# Patient Record
Sex: MALE | Race: WHITE | NOT HISPANIC OR LATINO | Employment: FULL TIME | URBAN - METROPOLITAN AREA
[De-identification: names, ages, dates, MRNs, and addresses within clinical notes are randomized per-mention and may not be internally consistent; named-entity substitution may affect disease eponyms.]

---

## 2022-05-24 ENCOUNTER — HOSPITAL ENCOUNTER (EMERGENCY)
Facility: HOSPITAL | Age: 57
Discharge: HOME | End: 2022-05-24
Attending: EMERGENCY MEDICINE
Payer: COMMERCIAL

## 2022-05-24 VITALS
HEIGHT: 72 IN | WEIGHT: 165 LBS | SYSTOLIC BLOOD PRESSURE: 167 MMHG | RESPIRATION RATE: 18 BRPM | DIASTOLIC BLOOD PRESSURE: 84 MMHG | TEMPERATURE: 99 F | OXYGEN SATURATION: 100 % | BODY MASS INDEX: 22.35 KG/M2 | HEART RATE: 83 BPM

## 2022-05-24 DIAGNOSIS — S20.212A RIB CONTUSION, LEFT, INITIAL ENCOUNTER: ICD-10-CM

## 2022-05-24 DIAGNOSIS — R91.1 PULMONARY NODULE LESS THAN 6 MM IN DIAMETER WITH HIGH RISK FOR MALIGNANT NEOPLASM: ICD-10-CM
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
## 2022-05-24 DIAGNOSIS — Z91.89 PULMONARY NODULE LESS THAN 6 MM IN DIAMETER WITH HIGH RISK FOR MALIGNANT NEOPLASM: ICD-10-CM

## 2022-05-24 PROCEDURE — 99284 EMERGENCY DEPT VISIT MOD MDM: CPT | Mod: 25

## 2022-05-24 RX ORDER — NAPROXEN 500 MG/1
500 TABLET ORAL 2 TIMES DAILY WITH MEALS
Qty: 30 TABLET | Refills: 0 | Status: ON HOLD | OUTPATIENT
Start: 2022-05-24 | End: 2022-08-20 | Stop reason: HOSPADM

## 2022-05-24 RX ORDER — LIDOCAINE 50 MG/G
1 PATCH TOPICAL DAILY
Qty: 5 PATCH | Refills: 0 | Status: SHIPPED | OUTPATIENT
Start: 2022-05-24 | End: 2022-12-08 | Stop reason: SDUPTHER

## 2022-05-24 NOTE — DISCHARGE INSTRUCTIONS

## 2022-05-24 NOTE — ED TRIAGE NOTES
Pt presents to the Ed for a CT scan . Pt was told to come get a futhure evaluation after having a xray of chest 5/9 after he fell at work and fracture his ribs.         Patient identifiers verified and correct.     APPEARANCE: Patient not in acute distress.  NEURO: Awake, alert, appropriate for age, condition, and situation, pupils equal, round, and reactive.   HEENT: Head symmetrical. Eyes bilateral.  Bilateral ears without drainage. Bilateral nares patent, throat clear.  CARDIAC: Regular rate and rhythm  RESPIRATORY: Airway is open and patent. Respirations are normal and spontaneous on room air.   GI/: Abdomen soft and non-distended.   NEUROVASCULAR: All extremities are warm and pink. .  MUSCULOSKELETAL: Moves all extremities.   SKIN: Warm and dry, adequate turgor, mucus membranes moist and pink; no breakdown, lesions, or ecchymosis noted.     Will continue to monitor.

## 2022-05-24 NOTE — ED PROVIDER NOTES
Encounter Date: 5/24/2022       History     Chief Complaint   Patient presents with    Rib Injury     Rib injury at work fall from standing position onto a truck on left side 5/9. No sob or weakness. Patient sent over for CT. Had xray per work and sent for further evaluatin     57-year-old male presents to ED by request of urgent care to have CT of chest performed for evaluation of possible rib x-ray.  Patient reports having fall at work contusing left-sided rib cage on 05/09.  He has continued to have pain to contusion location since fall, prompting him to follow-up with outside urgent care today.  Patient reports urgent care did perform chest x-ray with suspicious findings for rib fracture and instructed patient to report to ED for CT.  Patient described pain to site as sharp, nonradiating, worse while lying on left side or sneezing, severity 8/10.  He has attempted Tylenol with minimal improvement.  He denies cough, shortness of breath, nausea, vomiting.  No other acute complaints at this time.    The history is provided by the patient.     Review of patient's allergies indicates:  No Known Allergies  No past medical history on file.  No past surgical history on file.  No family history on file.     Review of Systems   Constitutional: Negative for chills and fever.   Respiratory: Negative for cough and shortness of breath.    Cardiovascular: Negative for palpitations and leg swelling.        Rib pain   Gastrointestinal: Negative for abdominal pain, nausea and vomiting.   Musculoskeletal: Negative for back pain, neck pain and neck stiffness.       Physical Exam     Initial Vitals [05/24/22 1257]   BP Pulse Resp Temp SpO2   (!) 190/86 96 20 98.8 °F (37.1 °C) 98 %      MAP       --         Physical Exam    Nursing note and vitals reviewed.  Constitutional: He appears well-developed and well-nourished. He is active. He does not have a sickly appearance. He does not appear ill. No distress.   HENT:   Head:  Normocephalic and atraumatic.   Neck:   Normal range of motion.  Cardiovascular: Normal rate, regular rhythm and normal heart sounds.   Pulmonary/Chest: Effort normal and breath sounds normal.   Left-sided lateral chest wall tenderness over mid rib cage.  No physical palpable deformities.  No overlying skin changes or bruising.  No crepitus.  Lungs clear bilaterally.   Musculoskeletal:      Cervical back: Normal range of motion.     Neurological: He is alert. GCS eye subscore is 4. GCS verbal subscore is 5. GCS motor subscore is 6.   Skin: Skin is warm and dry.   Psychiatric: He has a normal mood and affect. His speech is normal and behavior is normal.         ED Course   Procedures  Labs Reviewed - No data to display       Imaging Results           CT Chest Without Contrast (Final result)  Result time 05/24/22 14:33:13    Final result by Imtiaz Saxena MD (05/24/22 14:33:13)                 Impression:      This report was flagged in Epic as abnormal.    1. No acute solid organ injury within the thorax or upper abdomen.  No convincing acute displaced fracture or dislocation.  No pneumothorax.  2. Scattered cavitary lesions throughout the pulmonary parenchyma, most prominently involving the left lower lobe near the fissure.  Comparison with any previous examinations would be helpful as malignant and infectious causes can present in this fashion.  Findings are superimposed upon emphysematous change.  Pulmonary nodules or described as above, term follow-up, no greater than 3 months, is recommended pending comparison to previous examinations.  3. Please see above for several additional findings.      Electronically signed by: Imtiaz Saxena MD  Date:    05/24/2022  Time:    14:33             Narrative:    EXAMINATION:  CT CHEST WITHOUT CONTRAST    CLINICAL HISTORY:  Chest trauma, blunt;    TECHNIQUE:  Low dose axial images, sagittal and coronal reformations were obtained from the thoracic inlet to the lung bases.  Contrast was not administered.    COMPARISON:  None.    FINDINGS:  The structures at the base of the neck are unremarkable.  There are a few scattered upper limit of normal caliber to mildly prominent mediastinal lymph nodes particularly in the region of the AP window.  The heart is not enlarged.  No pericardial effusion.  The thoracic aorta tapers normally noting atherosclerotic calcification along its course.  Allowing for motion artifact, the visualized portions of the spleen, pancreas, adrenal glands, kidneys, gallbladder and liver are grossly unremarkable.  There is a low attenuating lesion arising from the upper pole of the right kidney measuring 1.2 cm with attenuation suggesting cyst.    The airways are patent.  There is scattered pulmonary emphysematous change and multiple scattered regions of bandlike atelectasis/scarring.  There is scattered peripheral pulmonary fibrotic change particularly along the anterior aspects of the bilateral upper lobes.  There are several scattered cavitary lesions throughout the pulmonary parenchyma, largest within the right upper lobe measures 9.3 cm.  There are a few scattered tree-in-bud type nodules within the right upper lobe laterally.  There is bilateral basilar dependent atelectasis/scarring.  There is a pulmonary nodule within the left lung apex measuring 6-7 mm with possible early central cavitation.  Additional cavitary lesion is noted within the left upper lobe measuring 4-5 mm.  There is a 2-3 mm pulmonary nodule within the inferior aspect of the lingula.  There are several clustered cavitary lesions within the left lower lobe with surrounding tree-in-bud type nodularity and ground-glass attenuation.  Largest thick-walled  cavitary lesion within the left lower lobe measures 3.4 cm, and abuts the fissure on the left.  There is adjacent peribronchial wall thickening.  No pneumothorax.  No pleural effusion.  No pneumothorax.    There is osteopenia.  No acute displaced  rib fracture.  The sternum is intact.  No significant axillary lymphadenopathy.  There may be remote injury involving the inferior aspect of left rib 11.                                 Medications - No data to display  Medical Decision Making:   Initial Assessment:   Patient presents by requested urgent care requesting CT of chest for rib x-ray evaluation.  Patient having fall with left-sided rib contusion on 05/09.  Continued pain to contusion site.  No cough shortness of breath.  Afebrile arrival with O2 sat 90% on room air.  On exam, reproducible tenderness to left-sided mid lateral rib cage.  No physical or palpable deformities.  Lungs clear bilaterally.  Differential Diagnosis:   Fall, contusion, strain, sprain, dislocation, fracture, pneumothorax, hemothorax  ED Management:  CT chest    After lengthy discussion with patient, he does repeatedly stated requested proceeding with CT of chest for further evaluation.    CT showing no acute solid organ injury within the thorax or upper abdomen.  No convincing acute displaced fracture or dislocation.  No pneumothorax.    Incidental findings of scattered cavitary lesions throughout the pulmonary parenchyma, most prominently involving the left lower lobe near the fissure.  Results discussed with patient.  He does admit to smoking for a long time.  Ambulatory referral will be sent to pulmonology.    Prescription for Lidoderm patches and naproxen.  Encouraged addition Tylenol as needed, ice, rest, monitor symptoms closely, close PCP follow-up.  ED return precautions discussed at length.  Patient states his understanding and agrees with plan.    Patient was discussed with attending, Dr. Baeza, who agrees with ED course and dispo.                      Clinical Impression:   Final diagnoses:  [W19.XXXA] Fall, initial encounter (Primary)  [S20.212A] Rib contusion, left, initial encounter  [R91.1, Z91.89] Pulmonary nodule less than 6 mm in diameter with high risk for  malignant neoplasm          ED Disposition Condition    Discharge Stable        ED Prescriptions     Medication Sig Dispense Start Date End Date Auth. Provider    naproxen (NAPROSYN) 500 MG tablet Take 1 tablet (500 mg total) by mouth 2 (two) times daily with meals. 30 tablet 5/24/2022  Chadd Marquez PA-C    LIDOcaine (LIDODERM) 5 % Place 1 patch onto the skin once daily. Remove & Discard patch within 12 hours or as directed by MD 5 patch 5/24/2022  Chadd Marquez PA-C        Follow-up Information     Follow up With Specialties Details Why Contact Info    Your Doctor               Chadd Marquez PA-C  05/24/22 3076

## 2022-05-24 NOTE — Clinical Note
"Hai Garcia" Christian was seen and treated in our emergency department on 5/24/2022.  He may return to work on 05/27/2022.       If you have any questions or concerns, please don't hesitate to call.      Chadd Marquez PA-C"

## 2022-05-25 ENCOUNTER — TELEPHONE (OUTPATIENT)
Dept: PULMONOLOGY | Facility: CLINIC | Age: 57
End: 2022-05-25

## 2022-05-25 NOTE — TELEPHONE ENCOUNTER
Received a Lenda message from the Echo  Lorie Tiwari for a pulmonary consult . I called the phone number listed [phone] and a man answered and said I had the wrong number he is not Hai Gonzales. The radiologist who read the Ct recommended a 3 month followup scan. I will hold this message and try again tomorrow. Renata Gilliland LPN

## 2022-05-25 NOTE — TELEPHONE ENCOUNTER
----- Message from Lorie Tiwari sent at 5/25/2022  2:37 PM CDT -----  Good afternoon,    The patient has a referral from the emergency department with a diagnosis of Pulmonary nodule less than 6 mm in diameter with high risk for malignant neoplas... Please assist with scheduling the patient?    Thank you

## 2022-08-15 ENCOUNTER — HOSPITAL ENCOUNTER (INPATIENT)
Facility: HOSPITAL | Age: 57
LOS: 5 days | Discharge: HOME | DRG: 177 | End: 2022-08-20
Attending: EMERGENCY MEDICINE | Admitting: INTERNAL MEDICINE
Payer: MEDICAID

## 2022-08-15 DIAGNOSIS — J18.9 PNEUMONIA OF LEFT UPPER LOBE DUE TO INFECTIOUS ORGANISM: ICD-10-CM

## 2022-08-15 DIAGNOSIS — R89.9 ACID-FAST BACTERIA PRESENT: ICD-10-CM

## 2022-08-15 DIAGNOSIS — U07.1 COVID-19 VIRUS INFECTION: ICD-10-CM

## 2022-08-15 DIAGNOSIS — R07.9 CHEST PAIN: ICD-10-CM

## 2022-08-15 DIAGNOSIS — E86.0 DEHYDRATION: ICD-10-CM

## 2022-08-15 DIAGNOSIS — R11.2 NAUSEA & VOMITING: ICD-10-CM

## 2022-08-15 DIAGNOSIS — R04.2 HEMOPTYSIS: ICD-10-CM

## 2022-08-15 DIAGNOSIS — E87.1 HYPONATREMIA: ICD-10-CM

## 2022-08-15 DIAGNOSIS — J98.4 CAVITARY LESION OF LUNG: Primary | ICD-10-CM

## 2022-08-15 DIAGNOSIS — R93.89 ABNORMAL CT OF THE CHEST: ICD-10-CM

## 2022-08-15 PROBLEM — K92.0 HEMATEMESIS OF UNKNOWN ETIOLOGY: Status: ACTIVE | Noted: 2022-08-15

## 2022-08-15 PROBLEM — D72.829 LEUKOCYTOSIS: Status: ACTIVE | Noted: 2022-08-15

## 2022-08-15 PROBLEM — F17.200 NICOTINE DEPENDENCE: Status: ACTIVE | Noted: 2022-08-15

## 2022-08-15 LAB
ABO + RH BLD: NORMAL
ALBUMIN SERPL BCP-MCNC: 2.5 G/DL (ref 3.5–5.2)
ALP SERPL-CCNC: 57 U/L (ref 55–135)
ALT SERPL W/O P-5'-P-CCNC: 7 U/L (ref 10–44)
ANION GAP SERPL CALC-SCNC: 11 MMOL/L (ref 8–16)
ANION GAP SERPL CALC-SCNC: 11 MMOL/L (ref 8–16)
APTT BLDCRRT: 36.2 SEC (ref 21–32)
AST SERPL-CCNC: 17 U/L (ref 10–40)
BASOPHILS # BLD AUTO: 0.04 K/UL (ref 0–0.2)
BASOPHILS NFR BLD: 0.3 % (ref 0–1.9)
BILIRUB SERPL-MCNC: 0.2 MG/DL (ref 0.1–1)
BLD GP AB SCN CELLS X3 SERPL QL: NORMAL
BNP SERPL-MCNC: 22 PG/ML (ref 0–99)
BUN SERPL-MCNC: 8 MG/DL (ref 6–20)
BUN SERPL-MCNC: 9 MG/DL (ref 6–20)
CALCIUM SERPL-MCNC: 8.9 MG/DL (ref 8.7–10.5)
CALCIUM SERPL-MCNC: 9 MG/DL (ref 8.7–10.5)
CHLORIDE SERPL-SCNC: 87 MMOL/L (ref 95–110)
CHLORIDE SERPL-SCNC: 90 MMOL/L (ref 95–110)
CK SERPL-CCNC: 131 U/L (ref 20–200)
CO2 SERPL-SCNC: 24 MMOL/L (ref 23–29)
CO2 SERPL-SCNC: 26 MMOL/L (ref 23–29)
CREAT SERPL-MCNC: 0.6 MG/DL (ref 0.5–1.4)
CREAT SERPL-MCNC: 0.7 MG/DL (ref 0.5–1.4)
CTP QC/QA: YES
D DIMER PPP IA.FEU-MCNC: 0.74 MG/L FEU
DIFFERENTIAL METHOD: ABNORMAL
EOSINOPHIL # BLD AUTO: 0 K/UL (ref 0–0.5)
EOSINOPHIL NFR BLD: 0.1 % (ref 0–8)
ERYTHROCYTE [DISTWIDTH] IN BLOOD BY AUTOMATED COUNT: 13.6 % (ref 11.5–14.5)
ERYTHROCYTE [SEDIMENTATION RATE] IN BLOOD BY WESTERGREN METHOD: 111 MM/HR (ref 0–10)
EST. GFR  (NO RACE VARIABLE): >60 ML/MIN/1.73 M^2
EST. GFR  (NO RACE VARIABLE): >60 ML/MIN/1.73 M^2
FERRITIN SERPL-MCNC: 4271 NG/ML (ref 20–300)
GLUCOSE SERPL-MCNC: 134 MG/DL (ref 70–110)
GLUCOSE SERPL-MCNC: 99 MG/DL (ref 70–110)
HCT VFR BLD AUTO: 39.4 % (ref 40–54)
HGB BLD-MCNC: 13.4 G/DL (ref 14–18)
IMM GRANULOCYTES # BLD AUTO: 0.09 K/UL (ref 0–0.04)
IMM GRANULOCYTES NFR BLD AUTO: 0.6 % (ref 0–0.5)
INR PPP: 1.2 (ref 0.8–1.2)
INR PPP: 1.2 (ref 0.8–1.2)
LACTATE SERPL-SCNC: 1.1 MMOL/L (ref 0.5–2.2)
LDH SERPL L TO P-CCNC: 161 U/L (ref 110–260)
LIPASE SERPL-CCNC: 16 U/L (ref 4–60)
LYMPHOCYTES # BLD AUTO: 1 K/UL (ref 1–4.8)
LYMPHOCYTES NFR BLD: 6.4 % (ref 18–48)
MCH RBC QN AUTO: 30.3 PG (ref 27–31)
MCHC RBC AUTO-ENTMCNC: 34 G/DL (ref 32–36)
MCV RBC AUTO: 89 FL (ref 82–98)
MONOCYTES # BLD AUTO: 1.1 K/UL (ref 0.3–1)
MONOCYTES NFR BLD: 7.1 % (ref 4–15)
NEUTROPHILS # BLD AUTO: 13.5 K/UL (ref 1.8–7.7)
NEUTROPHILS NFR BLD: 85.5 % (ref 38–73)
NRBC BLD-RTO: 0 /100 WBC
PLATELET # BLD AUTO: 333 K/UL (ref 150–450)
PMV BLD AUTO: 8.9 FL (ref 9.2–12.9)
POTASSIUM SERPL-SCNC: 4.5 MMOL/L (ref 3.5–5.1)
POTASSIUM SERPL-SCNC: 4.7 MMOL/L (ref 3.5–5.1)
PROT SERPL-MCNC: 7.3 G/DL (ref 6–8.4)
PROTHROMBIN TIME: 11.9 SEC (ref 9–12.5)
PROTHROMBIN TIME: 12.4 SEC (ref 9–12.5)
RBC # BLD AUTO: 4.42 M/UL (ref 4.6–6.2)
SARS-COV-2 RDRP RESP QL NAA+PROBE: POSITIVE
SODIUM SERPL-SCNC: 124 MMOL/L (ref 136–145)
SODIUM SERPL-SCNC: 125 MMOL/L (ref 136–145)
SODIUM UR-SCNC: <10 MMOL/L (ref 20–250)
TROPONIN I SERPL DL<=0.01 NG/ML-MCNC: <0.006 NG/ML (ref 0–0.03)
WBC # BLD AUTO: 15.71 K/UL (ref 3.9–12.7)

## 2022-08-15 PROCEDURE — 80048 BASIC METABOLIC PNL TOTAL CA: CPT | Mod: XB

## 2022-08-15 PROCEDURE — 25500020 PHARM REV CODE 255: Performed by: EMERGENCY MEDICINE

## 2022-08-15 PROCEDURE — 87205 SMEAR GRAM STAIN: CPT | Performed by: EMERGENCY MEDICINE

## 2022-08-15 PROCEDURE — 87070 CULTURE OTHR SPECIMN AEROBIC: CPT | Mod: 59

## 2022-08-15 PROCEDURE — 11000001 HC ACUTE MED/SURG PRIVATE ROOM

## 2022-08-15 PROCEDURE — 83690 ASSAY OF LIPASE: CPT | Performed by: EMERGENCY MEDICINE

## 2022-08-15 PROCEDURE — 85379 FIBRIN DEGRADATION QUANT: CPT

## 2022-08-15 PROCEDURE — 85025 COMPLETE CBC W/AUTO DIFF WBC: CPT | Performed by: EMERGENCY MEDICINE

## 2022-08-15 PROCEDURE — 83880 ASSAY OF NATRIURETIC PEPTIDE: CPT | Performed by: INTERNAL MEDICINE

## 2022-08-15 PROCEDURE — 86901 BLOOD TYPING SEROLOGIC RH(D): CPT | Performed by: EMERGENCY MEDICINE

## 2022-08-15 PROCEDURE — 25000003 PHARM REV CODE 250: Performed by: STUDENT IN AN ORGANIZED HEALTH CARE EDUCATION/TRAINING PROGRAM

## 2022-08-15 PROCEDURE — 82728 ASSAY OF FERRITIN: CPT

## 2022-08-15 PROCEDURE — 36415 COLL VENOUS BLD VENIPUNCTURE: CPT

## 2022-08-15 PROCEDURE — 82550 ASSAY OF CK (CPK): CPT

## 2022-08-15 PROCEDURE — 27000207 HC ISOLATION

## 2022-08-15 PROCEDURE — 85610 PROTHROMBIN TIME: CPT | Performed by: EMERGENCY MEDICINE

## 2022-08-15 PROCEDURE — 83615 LACTATE (LD) (LDH) ENZYME: CPT

## 2022-08-15 PROCEDURE — 87070 CULTURE OTHR SPECIMN AEROBIC: CPT | Performed by: STUDENT IN AN ORGANIZED HEALTH CARE EDUCATION/TRAINING PROGRAM

## 2022-08-15 PROCEDURE — C9113 INJ PANTOPRAZOLE SODIUM, VIA: HCPCS | Performed by: EMERGENCY MEDICINE

## 2022-08-15 PROCEDURE — 96374 THER/PROPH/DIAG INJ IV PUSH: CPT

## 2022-08-15 PROCEDURE — 93010 EKG 12-LEAD: ICD-10-PCS | Mod: ,,, | Performed by: INTERNAL MEDICINE

## 2022-08-15 PROCEDURE — 83935 ASSAY OF URINE OSMOLALITY: CPT

## 2022-08-15 PROCEDURE — 94640 AIRWAY INHALATION TREATMENT: CPT

## 2022-08-15 PROCEDURE — 93005 ELECTROCARDIOGRAM TRACING: CPT

## 2022-08-15 PROCEDURE — 87070 CULTURE OTHR SPECIMN AEROBIC: CPT | Mod: 59 | Performed by: EMERGENCY MEDICINE

## 2022-08-15 PROCEDURE — 99291 CRITICAL CARE FIRST HOUR: CPT | Mod: 25

## 2022-08-15 PROCEDURE — 87205 SMEAR GRAM STAIN: CPT | Mod: 59

## 2022-08-15 PROCEDURE — 80053 COMPREHEN METABOLIC PANEL: CPT | Performed by: EMERGENCY MEDICINE

## 2022-08-15 PROCEDURE — 87205 SMEAR GRAM STAIN: CPT | Mod: 59 | Performed by: STUDENT IN AN ORGANIZED HEALTH CARE EDUCATION/TRAINING PROGRAM

## 2022-08-15 PROCEDURE — 84300 ASSAY OF URINE SODIUM: CPT

## 2022-08-15 PROCEDURE — 85652 RBC SED RATE AUTOMATED: CPT

## 2022-08-15 PROCEDURE — 96361 HYDRATE IV INFUSION ADD-ON: CPT

## 2022-08-15 PROCEDURE — 63600175 PHARM REV CODE 636 W HCPCS: Performed by: EMERGENCY MEDICINE

## 2022-08-15 PROCEDURE — 83605 ASSAY OF LACTIC ACID: CPT

## 2022-08-15 PROCEDURE — U0002 COVID-19 LAB TEST NON-CDC: HCPCS | Performed by: EMERGENCY MEDICINE

## 2022-08-15 PROCEDURE — 36415 COLL VENOUS BLD VENIPUNCTURE: CPT | Performed by: INTERNAL MEDICINE

## 2022-08-15 PROCEDURE — 93010 ELECTROCARDIOGRAM REPORT: CPT | Mod: ,,, | Performed by: INTERNAL MEDICINE

## 2022-08-15 PROCEDURE — 85610 PROTHROMBIN TIME: CPT | Mod: 91

## 2022-08-15 PROCEDURE — 25000003 PHARM REV CODE 250

## 2022-08-15 PROCEDURE — 85730 THROMBOPLASTIN TIME PARTIAL: CPT

## 2022-08-15 PROCEDURE — 63600175 PHARM REV CODE 636 W HCPCS

## 2022-08-15 PROCEDURE — 84484 ASSAY OF TROPONIN QUANT: CPT

## 2022-08-15 PROCEDURE — 94799 UNLISTED PULMONARY SVC/PX: CPT

## 2022-08-15 RX ORDER — ALBUTEROL SULFATE 2.5 MG/.5ML
2.5 SOLUTION RESPIRATORY (INHALATION)
Status: DISCONTINUED | OUTPATIENT
Start: 2022-08-15 | End: 2022-08-15

## 2022-08-15 RX ORDER — ACETAMINOPHEN 325 MG/1
650 TABLET ORAL EVERY 4 HOURS PRN
Status: DISCONTINUED | OUTPATIENT
Start: 2022-08-15 | End: 2022-08-20 | Stop reason: HOSPADM

## 2022-08-15 RX ORDER — IBUPROFEN 200 MG
24 TABLET ORAL
Status: DISCONTINUED | OUTPATIENT
Start: 2022-08-15 | End: 2022-08-20 | Stop reason: HOSPADM

## 2022-08-15 RX ORDER — LOPERAMIDE HYDROCHLORIDE 2 MG/1
2 CAPSULE ORAL 4 TIMES DAILY PRN
Status: DISCONTINUED | OUTPATIENT
Start: 2022-08-15 | End: 2022-08-20 | Stop reason: HOSPADM

## 2022-08-15 RX ORDER — ASCORBIC ACID 500 MG
500 TABLET ORAL 2 TIMES DAILY
Status: DISCONTINUED | OUTPATIENT
Start: 2022-08-15 | End: 2022-08-20 | Stop reason: HOSPADM

## 2022-08-15 RX ORDER — SODIUM CHLORIDE 0.9 % (FLUSH) 0.9 %
10 SYRINGE (ML) INJECTION EVERY 8 HOURS PRN
Status: DISCONTINUED | OUTPATIENT
Start: 2022-08-15 | End: 2022-08-20 | Stop reason: HOSPADM

## 2022-08-15 RX ORDER — MAG HYDROX/ALUMINUM HYD/SIMETH 200-200-20
30 SUSPENSION, ORAL (FINAL DOSE FORM) ORAL 4 TIMES DAILY PRN
Status: DISCONTINUED | OUTPATIENT
Start: 2022-08-15 | End: 2022-08-20 | Stop reason: HOSPADM

## 2022-08-15 RX ORDER — ONDANSETRON 2 MG/ML
4 INJECTION INTRAMUSCULAR; INTRAVENOUS EVERY 6 HOURS PRN
Status: DISCONTINUED | OUTPATIENT
Start: 2022-08-15 | End: 2022-08-20 | Stop reason: HOSPADM

## 2022-08-15 RX ORDER — PANTOPRAZOLE SODIUM 40 MG/10ML
80 INJECTION, POWDER, LYOPHILIZED, FOR SOLUTION INTRAVENOUS
Status: COMPLETED | OUTPATIENT
Start: 2022-08-15 | End: 2022-08-15

## 2022-08-15 RX ORDER — INSULIN ASPART 100 [IU]/ML
0-5 INJECTION, SOLUTION INTRAVENOUS; SUBCUTANEOUS
Status: DISCONTINUED | OUTPATIENT
Start: 2022-08-15 | End: 2022-08-20 | Stop reason: HOSPADM

## 2022-08-15 RX ORDER — SIMETHICONE 80 MG
1 TABLET,CHEWABLE ORAL 4 TIMES DAILY PRN
Status: DISCONTINUED | OUTPATIENT
Start: 2022-08-15 | End: 2022-08-20 | Stop reason: HOSPADM

## 2022-08-15 RX ORDER — ALBUTEROL SULFATE 90 UG/1
2 AEROSOL, METERED RESPIRATORY (INHALATION) EVERY 4 HOURS PRN
Status: DISCONTINUED | OUTPATIENT
Start: 2022-08-15 | End: 2022-08-20 | Stop reason: HOSPADM

## 2022-08-15 RX ORDER — BENZONATATE 100 MG/1
100 CAPSULE ORAL 3 TIMES DAILY PRN
Status: DISCONTINUED | OUTPATIENT
Start: 2022-08-15 | End: 2022-08-20 | Stop reason: HOSPADM

## 2022-08-15 RX ORDER — PREDNISONE 20 MG/1
40 TABLET ORAL
Status: COMPLETED | OUTPATIENT
Start: 2022-08-15 | End: 2022-08-15

## 2022-08-15 RX ORDER — SODIUM CHLORIDE 0.9 % (FLUSH) 0.9 %
10 SYRINGE (ML) INJECTION
Status: DISCONTINUED | OUTPATIENT
Start: 2022-08-15 | End: 2022-08-20 | Stop reason: HOSPADM

## 2022-08-15 RX ORDER — SODIUM CHLORIDE FOR INHALATION 3 %
4 VIAL, NEBULIZER (ML) INHALATION
Status: DISCONTINUED | OUTPATIENT
Start: 2022-08-15 | End: 2022-08-15

## 2022-08-15 RX ORDER — IBUPROFEN 200 MG
16 TABLET ORAL
Status: DISCONTINUED | OUTPATIENT
Start: 2022-08-15 | End: 2022-08-20 | Stop reason: HOSPADM

## 2022-08-15 RX ORDER — IPRATROPIUM BROMIDE AND ALBUTEROL SULFATE 2.5; .5 MG/3ML; MG/3ML
3 SOLUTION RESPIRATORY (INHALATION)
Status: ACTIVE | OUTPATIENT
Start: 2022-08-15 | End: 2022-08-15

## 2022-08-15 RX ORDER — TALC
6 POWDER (GRAM) TOPICAL NIGHTLY PRN
Status: DISCONTINUED | OUTPATIENT
Start: 2022-08-15 | End: 2022-08-20 | Stop reason: HOSPADM

## 2022-08-15 RX ORDER — GLUCAGON 1 MG
1 KIT INJECTION
Status: DISCONTINUED | OUTPATIENT
Start: 2022-08-15 | End: 2022-08-20 | Stop reason: HOSPADM

## 2022-08-15 RX ADMIN — SODIUM CHLORIDE, SODIUM LACTATE, POTASSIUM CHLORIDE, AND CALCIUM CHLORIDE 500 ML: .6; .31; .03; .02 INJECTION, SOLUTION INTRAVENOUS at 12:08

## 2022-08-15 RX ADMIN — SODIUM CHLORIDE 1000 ML: 0.9 INJECTION, SOLUTION INTRAVENOUS at 05:08

## 2022-08-15 RX ADMIN — AZITHROMYCIN MONOHYDRATE 500 MG: 500 INJECTION, POWDER, LYOPHILIZED, FOR SOLUTION INTRAVENOUS at 09:08

## 2022-08-15 RX ADMIN — OXYCODONE HYDROCHLORIDE AND ACETAMINOPHEN 500 MG: 500 TABLET ORAL at 09:08

## 2022-08-15 RX ADMIN — REMDESIVIR 200 MG: 100 INJECTION, POWDER, LYOPHILIZED, FOR SOLUTION INTRAVENOUS at 05:08

## 2022-08-15 RX ADMIN — CEFTRIAXONE 1 G: 1 INJECTION, SOLUTION INTRAVENOUS at 01:08

## 2022-08-15 RX ADMIN — TRANEXAMIC ACID 500 MG: 100 INJECTION, SOLUTION INTRAVENOUS at 09:08

## 2022-08-15 RX ADMIN — IOHEXOL 100 ML: 350 INJECTION, SOLUTION INTRAVENOUS at 02:08

## 2022-08-15 RX ADMIN — PREDNISONE 40 MG: 20 TABLET ORAL at 10:08

## 2022-08-15 RX ADMIN — PANTOPRAZOLE SODIUM 80 MG: 40 INJECTION, POWDER, FOR SOLUTION INTRAVENOUS at 10:08

## 2022-08-15 NOTE — SUBJECTIVE & OBJECTIVE
No past medical history on file.    No past surgical history on file.    Review of patient's allergies indicates:  No Known Allergies    No current facility-administered medications on file prior to encounter.     Current Outpatient Medications on File Prior to Encounter   Medication Sig    LIDOcaine (LIDODERM) 5 % Place 1 patch onto the skin once daily. Remove & Discard patch within 12 hours or as directed by MD    naproxen (NAPROSYN) 500 MG tablet Take 1 tablet (500 mg total) by mouth 2 (two) times daily with meals.     Family History    None       Tobacco Use    Smoking status: Not on file    Smokeless tobacco: Not on file   Substance and Sexual Activity    Alcohol use: Not on file    Drug use: Not on file    Sexual activity: Not on file     Review of Systems   Constitutional:  Positive for activity change, appetite change, chills, fatigue, fever and unexpected weight change. Negative for diaphoresis.   HENT:  Negative for hearing loss and sore throat.    Eyes:  Negative for visual disturbance.   Respiratory:  Positive for cough and wheezing.    Cardiovascular:  Negative for chest pain, palpitations and leg swelling.   Gastrointestinal:  Positive for diarrhea. Negative for abdominal pain, constipation, nausea and vomiting.   Genitourinary:  Negative for difficulty urinating, dysuria, flank pain and hematuria.   Musculoskeletal:  Negative for back pain.   Skin:  Negative for rash and wound.   Neurological:  Negative for dizziness, weakness and headaches.   Psychiatric/Behavioral:  Negative for agitation and confusion.    Objective:     Vital Signs (Most Recent):  Temp: 99.5 °F (37.5 °C) (08/15/22 1042)  Pulse: 89 (08/15/22 1554)  Resp: 20 (08/15/22 1554)  BP: 94/62 (08/15/22 1553)  SpO2: 97 % (08/15/22 1554) Vital Signs (24h Range):  Temp:  [99.5 °F (37.5 °C)] 99.5 °F (37.5 °C)  Pulse:  [] 89  Resp:  [18-20] 20  SpO2:  [97 %-98 %] 97 %  BP: ()/(62-66) 94/62     Weight: 68 kg (150 lb)  Body mass index  is 20.34 kg/m².    Physical Exam  Vitals and nursing note reviewed.   Constitutional:       General: He is not in acute distress.     Appearance: He is cachectic. He is ill-appearing.      Comments: Appears fatigue   HENT:      Head: Normocephalic and atraumatic.      Mouth/Throat:      Mouth: Mucous membranes are moist.   Eyes:      Extraocular Movements: Extraocular movements intact.      Pupils: Pupils are equal, round, and reactive to light.   Cardiovascular:      Rate and Rhythm: Normal rate and regular rhythm.      Pulses: Normal pulses.      Heart sounds: Normal heart sounds. No murmur heard.    No friction rub. No gallop.   Pulmonary:      Effort: Pulmonary effort is normal. No respiratory distress.      Breath sounds: No wheezing or rhonchi.      Comments: Expiratory wheezing appreciated bilaterally; good air movement  Abdominal:      General: Bowel sounds are normal. There is no distension.      Palpations: Abdomen is soft.      Tenderness: There is no abdominal tenderness. There is no guarding or rebound.   Musculoskeletal:         General: No swelling.      Cervical back: Normal range of motion and neck supple.      Right lower leg: No edema.      Left lower leg: No edema.   Skin:     General: Skin is warm and dry.      Capillary Refill: Capillary refill takes less than 2 seconds.      Findings: No bruising, erythema or rash.   Neurological:      General: No focal deficit present.      Mental Status: He is oriented to person, place, and time.      GCS: GCS eye subscore is 4. GCS verbal subscore is 5. GCS motor subscore is 6.   Psychiatric:         Mood and Affect: Mood normal. Affect is flat.         Behavior: Behavior is cooperative.         CRANIAL NERVES     CN III, IV, VI   Pupils are equal, round, and reactive to light.     Significant Labs: All pertinent labs within the past 24 hours have been reviewed.  Recent Results (from the past 24 hour(s))   CBC auto differential    Collection Time: 08/15/22  10:54 AM   Result Value Ref Range    WBC 15.71 (H) 3.90 - 12.70 K/uL    RBC 4.42 (L) 4.60 - 6.20 M/uL    Hemoglobin 13.4 (L) 14.0 - 18.0 g/dL    Hematocrit 39.4 (L) 40.0 - 54.0 %    MCV 89 82 - 98 fL    MCH 30.3 27.0 - 31.0 pg    MCHC 34.0 32.0 - 36.0 g/dL    RDW 13.6 11.5 - 14.5 %    Platelets 333 150 - 450 K/uL    MPV 8.9 (L) 9.2 - 12.9 fL    Immature Granulocytes 0.6 (H) 0.0 - 0.5 %    Gran # (ANC) 13.5 (H) 1.8 - 7.7 K/uL    Immature Grans (Abs) 0.09 (H) 0.00 - 0.04 K/uL    Lymph # 1.0 1.0 - 4.8 K/uL    Mono # 1.1 (H) 0.3 - 1.0 K/uL    Eos # 0.0 0.0 - 0.5 K/uL    Baso # 0.04 0.00 - 0.20 K/uL    nRBC 0 0 /100 WBC    Gran % 85.5 (H) 38.0 - 73.0 %    Lymph % 6.4 (L) 18.0 - 48.0 %    Mono % 7.1 4.0 - 15.0 %    Eosinophil % 0.1 0.0 - 8.0 %    Basophil % 0.3 0.0 - 1.9 %    Differential Method Automated    Comprehensive metabolic panel    Collection Time: 08/15/22 10:54 AM   Result Value Ref Range    Sodium 124 (L) 136 - 145 mmol/L    Potassium 4.7 3.5 - 5.1 mmol/L    Chloride 87 (L) 95 - 110 mmol/L    CO2 26 23 - 29 mmol/L    Glucose 99 70 - 110 mg/dL    BUN 9 6 - 20 mg/dL    Creatinine 0.7 0.5 - 1.4 mg/dL    Calcium 9.0 8.7 - 10.5 mg/dL    Total Protein 7.3 6.0 - 8.4 g/dL    Albumin 2.5 (L) 3.5 - 5.2 g/dL    Total Bilirubin 0.2 0.1 - 1.0 mg/dL    Alkaline Phosphatase 57 55 - 135 U/L    AST 17 10 - 40 U/L    ALT 7 (L) 10 - 44 U/L    Anion Gap 11 8 - 16 mmol/L    eGFR >60 >60 mL/min/1.73 m^2   Type & Screen    Collection Time: 08/15/22 10:54 AM   Result Value Ref Range    Group & Rh O POS     Indirect Charlie NEG    Lipase    Collection Time: 08/15/22 10:54 AM   Result Value Ref Range    Lipase 16 4 - 60 U/L   Protime-INR    Collection Time: 08/15/22 10:54 AM   Result Value Ref Range    Prothrombin Time 11.9 9.0 - 12.5 sec    INR 1.2 0.8 - 1.2   POCT COVID-19 Rapid Screening    Collection Time: 08/15/22 11:24 AM   Result Value Ref Range    POC Rapid COVID Positive (A) Negative     Acceptable Yes         Significant Imaging: I have reviewed all pertinent imaging results/findings within the past 24 hours.

## 2022-08-15 NOTE — PLAN OF CARE
Cued into the room. Nurse at the bedside. Patient just arrived to the room. Will return to the room in a few minutes

## 2022-08-15 NOTE — ASSESSMENT & PLAN NOTE
-reports nicotine dependence with cigarettes (60 pack year history)  -Dangers of cigarette smoking were reviewed with patient in detail for 10 minutes and patient was encouraged to quit.  -Nicotine replacement options were discussed.

## 2022-08-15 NOTE — CONSULTS
"Pulmonology/Critical Care Consult Note:    Chief complaint: cavitary lung lesions    HPI: Mr. Gonzales is a 58 yo M with a PMH of tobacco use, no other known hx but reports he does not see a PCP. He was found to have cavitary disease on a chest CT in 5/2022 done out of concern for rib fracture after a trauma, and was referred to Pulmonary clinic at [facility] for followup; however clinic staff was unable to reach him to schedule and had the wrong phone number. He presented today with reported "spitting up of bright red blood since this AM"; CTA Chest shows significant enlargement of his cavitary disease with some air-fluid level concerning for blood. Of note, he has a 60 py smoking hx, 2 months of worsening fatigue, weight loss, night sweats, and intermittent fevers. He has a chronic cough. He denies any history of exposure to TB, no time in shelters, in skilled nursing, in the , or our of the country. 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     In the ED he was given 1L bolus, IV PPI, prednisone, azithro, and ceftriaxone. He was found to be covid +. On my visit to bedside, he appeared tired but interactive, physical exam significant for cachexia, inspiratory squeaks to L lung with expiratory wheezing throughout with good air movement. Small amount of blood streaked phlegm in emesis bag, reports he thinks he has coughed about about 1/2 cup of blood throughout the day today.     Objective:  Vitals: reviewed  General: chronically ill-appearing, but in no distress  HEENT: nonicteric, MMM  CV: RRR, soft systolic murmur, no edema  Resp: no accessory muscle use, diffuse end-expiratory wheezing, inspiratory squeaks and crackles to L mid lung zone  Abd: soft, NTTP, BS+  Extr: no edema, no cyanosis, no clubbing  Skin: hyperpigmented spots to fingertips, growth to R cheek  Neuro: intact, no deficits    Labs: Impression: leukocytosis, no anemia, normal coags, hyponatremia, " hypochloremia, no [place], gamma gap +, covid +    Imaging: CTA 8/15:  Impression:  No pulmonary artery thromboembolism.  Worsening of left lower lobe and right upper lobe bronchiectasis, bronchial thickening and coalescent cavitation.  Tuberculosis should be strongly considered as well as non tuberculous mycobacterial infectiond and fungal infection.  Superimposed infection or hemorrhage into the large left lower lobe cavity is not excluded.    Echo: pending    PFTs: none available    Assessment:  1. Cavitary lung lesion enlarging; unknown etiology  2. Hemoptysis; not massive  3. Tobacco use hx  4. Covid +, no hypoxia    Plan/Recommendations:  -differential is still somewhat broad for a cavitary lesion which is continuing to increase over several months associated with systemic symptoms such as weight loss, fatigue, night sweats and subjective fevers; highest on my differential is malignancy given lack of risk factors, however active TB should be ruled out. Will need to wait for bronchoscopic biopsy for cytology until covid negative.  - will need sputum cx to rule out TB; 3 AFB sputum and culture 8 hours apart. MTB also ordered  -can order Quantiferon Gold but will not rule out active TB if negative  -would start inhaled TXA TID for hemoptysis  - At risk for conversation to massive hemoptysis; please save expectorated blood for quantification.  - if bleeding amount increases significantly please call ICU fellow to bedside, place patient L lung down. Would need emergent IR intervention for embolization of LLL, unlikely that R apical findings are source of bleed.  - Would treat for covid with steroids/remdesivir, +/- course of abx for CAP  -given clinical history and physical exam findings, likely has COPD, can administer duonebs for respiratory distress.    Thank you for the consult. We will continue to follow along with you. Discussed with attending Dr. Quynh Stephenson MD  PCCM fellow    Pt seen and examined  with Pulmonary/Critical Care team and this note reviewed and validated with the following additional comments:    Hemoptysis is not major. CT does show some slight progression of his multicentric fibrocavitary disease since May. The radiographic appearance is that of chronic granulomatous infection. Most likely NTM. Sputum AFB, fungus ordered. Pt is COVID positive so his bronch will have to wait. Isolate pt for now.     Sajan Riddle MD  Phone [phone]

## 2022-08-15 NOTE — ASSESSMENT & PLAN NOTE
-likely reactive in setting of COVID-19 infection and concern for underlying infectious superimposed pulmonary infection  -Procalcitonin pending  -Received 1g Rocephin in ED  -Will continue azithromycin/rocephin for CAP coverage for now; will de-escalate as needed  -Continue to monitor and trend CBC

## 2022-08-15 NOTE — ED PROVIDER NOTES
"Encounter Date: 8/15/2022       History     Chief Complaint   Patient presents with    Hemoptysis     Pt reports "spitting up" bright red blood since 0430 this morning. Pt also reporting subjective fever for appx 2 months.      Hai Gonzales is a 57 y.o. male who  has no past medical history on file.    The patient presents to the ED due to hemoptysis.   Patient states symptoms started this morning around 04:30.  He describes "spitting up" bright red blood and clots.  Denies any prior similar episodes.  Does admit to smoking 1.5 packs of cigarettes per day since age 17. He denies any history of COPD, takes no home medications, and has never seen a pulmonologist.  He presents with his fiancee, who states she recently had an exposure to COVID and is concerned he may have contracted COVID as well.  He denies any chest pain, vomiting, diarrhea, abdominal pain, or shortness of breath.  No other complaints or concerns.          Review of patient's allergies indicates:  No Known Allergies  No past medical history on file.  No past surgical history on file.  No family history on file.     Review of Systems   Constitutional: Positive for unexpected weight change. Negative for chills and fever.   HENT: Negative for sore throat.    Respiratory: Positive for cough. Negative for shortness of breath.    Cardiovascular: Negative for chest pain.   Gastrointestinal: Negative for abdominal pain, constipation, diarrhea, nausea and vomiting.   Genitourinary: Negative for dysuria, frequency and urgency.   Musculoskeletal: Positive for arthralgias and myalgias. Negative for back pain.   Skin: Negative for rash and wound.   Neurological: Negative for weakness.   Hematological: Does not bruise/bleed easily.   Psychiatric/Behavioral: Negative for agitation, behavioral problems and confusion.       Physical Exam     Initial Vitals   BP Pulse Resp Temp SpO2   08/15/22 1043 08/15/22 1043 08/15/22 1042 08/15/22 1042 08/15/22 1043   104/66 (!) " 117 18 99.5 °F (37.5 °C) 97 %      MAP       --                Physical Exam    Nursing note and vitals reviewed.  Constitutional: He appears well-developed and well-nourished. He is not diaphoretic. No distress.   HENT:   Head: Normocephalic and atraumatic.   Mouth/Throat: Oropharynx is clear and moist.   Eyes: EOM are normal. Pupils are equal, round, and reactive to light.   Neck: No tracheal deviation present.   Cardiovascular: Regular rhythm, normal heart sounds and intact distal pulses. Tachycardia present.    Heart rate 110s.   Pulmonary/Chest: No stridor. Tachypnea noted. No respiratory distress. He has wheezes (diffuse). He exhibits no tenderness.   Abdominal: Abdomen is soft. He exhibits no distension and no mass. There is no abdominal tenderness.   Musculoskeletal:         General: No edema. Normal range of motion.     Neurological: He is alert and oriented to person, place, and time. No cranial nerve deficit or sensory deficit.   Skin: Skin is warm and dry. Capillary refill takes less than 2 seconds. No rash noted.   Psychiatric: He has a normal mood and affect. His behavior is normal. Thought content normal.         ED Course   Procedures  Labs Reviewed   CBC W/ AUTO DIFFERENTIAL - Abnormal; Notable for the following components:       Result Value    WBC 15.71 (*)     RBC 4.42 (*)     Hemoglobin 13.4 (*)     Hematocrit 39.4 (*)     MPV 8.9 (*)     Immature Granulocytes 0.6 (*)     Gran # (ANC) 13.5 (*)     Immature Grans (Abs) 0.09 (*)     Mono # 1.1 (*)     Gran % 85.5 (*)     Lymph % 6.4 (*)     All other components within normal limits   COMPREHENSIVE METABOLIC PANEL - Abnormal; Notable for the following components:    Sodium 124 (*)     Chloride 87 (*)     Albumin 2.5 (*)     ALT 7 (*)     All other components within normal limits   SARS-COV-2 RDRP GENE - Abnormal; Notable for the following components:    POC Rapid COVID Positive (*)     All other components within normal limits   CULTURE,  RESPIRATORY   AFB CULTURE & SMEAR   LIPASE   PROTIME-INR   TYPE & SCREEN     EKG Readings: (Independently Interpreted)   Initial Reading: No STEMI. Previous EKG: Compared with most recent EKG Rhythm: Sinus Tachycardia.   Sinus tach, rate 111, no ST changes or evidence of ischemia, normal intervals.  No prior for comparison.       Imaging Results          CTA Chest Non-Coronary (PE Study) (Final result)  Result time 08/15/22 14:53:53    Final result by Terrence Jennings Jr., MD (08/15/22 14:53:53)                 Impression:      No pulmonary artery thromboembolism.    Worsening of left lower lobe and right upper lobe bronchiectasis, bronchial thickening and coalescent cavitation.  Tuberculosis should be strongly considered as well as non tuberculous mycobacterial infectiond and fungal infection.  Superimposed infection or hemorrhage into the large left lower lobe cavity is not excluded.      Electronically signed by: Terrence Pedersen Jr  Date:    08/15/2022  Time:    14:53             Narrative:    EXAMINATION:  CTA CHEST NON CORONARY    CLINICAL HISTORY:  Lung/mediastinal abscess;Pulmonary embolism (PE) suspected, high prob;    TECHNIQUE:  Low dose axial images, sagittal and coronal reformations were obtained from the thoracic inlet to the lung bases following the IV administration of 75 mL of Omnipaque 350.  Contrast timing was optimized to evaluate the pulmonary arteries.    COMPARISON:  Chest x-ray same day and previous CT chest 05/24/2022    FINDINGS:  There are no filling defects in the pulmonary arteries.  No CT evidence for right heart strain.    Heart is normal size.  Thoracic aorta is normal caliber.    Diffuse mediastinal and hilar adenopathy is present.    Biapical emphysema and pleuroparenchymal scarring and pleural thickening noted.  Extensive coalescent thick-walled cavitary lung opacity in the left lower lobe associated with bronchiectasis and adjacent tree-in-bud nodular opacity has worsened  compared with the prior CT, now at least 10 cm in AP diameter.  Additional satellite cavitary lesions versus peripheral areas of bronchiectasis are noted and there is some tree-in-bud opacity in the anterior left lower lobe.  An air-fluid level is present on today's examination which may indicate blood, pus, secretions etcetera.  In the anterior right upper lobe there are multiple smaller cavitary lesions which are also enlarged compared with the prior the largest measuring 2.5 cm.    Regional osseous structures are unremarkable with expected age related degenerative changes.  Limited evaluation of upper abdomen is unremarkable.                                X-Ray Chest PA And Lateral (Final result)  Result time 08/15/22 11:37:50    Final result by Terrence Jennings Jr., MD (08/15/22 11:37:50)                 Impression:      Large cavitary opacity in left lower lobe.  Tuberculosis is a consideration.    Background of obstructive lung disease    This report was flagged in Epic as abnormal.      Electronically signed by: Terrence Pedersen Jr  Date:    08/15/2022  Time:    11:37             Narrative:    EXAMINATION:  XR CHEST PA AND LATERAL    CLINICAL HISTORY:  Hemoptysis    TECHNIQUE:  PA and lateral views of the chest were performed.    COMPARISON:  CT chest 05/24/2022    FINDINGS:  The cardiac silhouette is normal in size. The hilar and mediastinal contours are unremarkable.    Extensive large cavitary opacity in the superior segment left lower lobe with air-fluid level redemonstrated, probably similar in size when accounting for differences in imaging modality.  Right apical pleuroparenchymal scarring and paraseptal emphysematous changes are noted.    Bones are intact. No pleural effusions.                                 Medications   albuterol-ipratropium 2.5 mg-0.5 mg/3 mL nebulizer solution 3 mL (3 mLs Nebulization Not Given 8/15/22 1100)   sodium chloride 3% nebulizer solution 4 mL (has no administration  in time range)   albuterol sulfate nebulizer solution 2.5 mg (has no administration in time range)   pantoprazole injection 80 mg (80 mg Intravenous Given 8/15/22 1057)   predniSONE tablet 40 mg (40 mg Oral Given 8/15/22 1059)   lactated ringers bolus 500 mL (0 mLs Intravenous Stopped 8/15/22 1342)   cefTRIAXone (ROCEPHIN) 1 g/50 mL D5W IVPB (0 g Intravenous Stopped 8/15/22 1314)   iohexoL (OMNIPAQUE 350) injection 100 mL (100 mLs Intravenous Given 8/15/22 1404)     Medical Decision Making:   History:   Old Medical Records: I decided to obtain old medical records.  Old Records Summarized: records from previous admission(s).       <> Summary of Records: Seen in ED May 2022 for chest pain after fall.  CT revealed multiple cavitary lesions, felt to be infectious versus malignant in nature.  Referred to pulmonology.  No further follow-up on file.  Initial Assessment:   58 yo M with tobacco abuse presents with hemoptysis.  Tachycardic. Wheezing throughout. Concern for underlying undiagnosed COPD given duration of tobacco use.  Will obtain labs, CXR, treat with DuoNebs, and continue to monitor.     Differential Diagnosis:   Differential Diagnosis includes, but is not limited to:  PE, MI/ACS, pneumothorax, pericardial effusion/tamonade, pneumonia, lung abscess, pericarditis/myocarditis, pleural effusion, lung mass, CHF exacerbation, asthma exacerbation, COPD exacerbation, aspirated/ingested foreign body, airway obstruction, CO poisoning, anemia, metabolic derangement, allergy/atopy, influenza, viral URI, viral syndrome.    Independently Interpreted Test(s):   I have ordered and independently interpreted X-rays - see prior notes.  I have ordered and independently interpreted EKG Reading(s) - see prior notes  Clinical Tests:   Lab Tests: Ordered and Reviewed  Radiological Study: Reviewed and Ordered  Medical Tests: Ordered and Reviewed  ED Management:  CXR with large cavitary lesion to L lower lobe. Concern for infection vs  malignancy. CT chest ordered.  Labs with hyponatremia, IV fluids given.  CT reveals no PE.  Worsening cavitary lung lesion.  Will admit for correction of hyponatremia, sputum cultures, and further management.      On re-evaluation, the patient's status has remained stable.  At this time, I believe the patient should be admitted to the hospital for further evaluation and management of cavitary lung lesion.  [facility] service was contacted and the case was discussed.   The consulting physician/team agrees with plan and will admit under their service.   The patient and family were updated with test results, overall impression, and further plan of care. All questions were answered. The patient expressed understanding and agrees with the current plan.              Attending Attestation:         Attending Critical Care:   Critical Care Times:   ==============================================================  · Total Critical Care Time - exclusive of procedural time: 35 minutes.  ==============================================================  Critical care was necessary to treat or prevent imminent or life-threatening deterioration of the following conditions: dehydration and metabolic crisis.   Critical Care Condition: critical                    Clinical Impression:   Final diagnoses:  [R11.2] Nausea & vomiting  [R04.2] Hemoptysis  [J18.9] Pneumonia of left upper lobe due to infectious organism  [U07.1] COVID-19 virus infection  [E87.1] Hyponatremia  [E86.0] Dehydration  [J98.4] Cavitary lesion of lung (Primary)          ED Disposition Condition    Admit                   Jorge Glass MD  08/15/22 [id number]

## 2022-08-15 NOTE — ASSESSMENT & PLAN NOTE
- COVID positive 8/15/22; initiated on protocol  - Isolation: Airborne/Droplet. Surgical mask on patient. Notify Infection Control  - CXR with large cavitary opacity in left lower lobe, no oxygen requirement on exam  - Monitor on Telemetry  -Received 1g Rocephin in ED x1  -Will continue Rocephin and Azithromycin due to concern for superimposed CAP  - initiated on dexamethasone, will continue for 10d course  - initiated on remdesivir x3d; daily CMP  -Procalcitonin pending  - CRP pending  -Covid Labs pending  - Order RT consult via Respiratory Communication for COVID Protocols  - Incentive Spirometer Q4h  - Continuous Pulse Oximetry, goal SpO2 >90%  - supplemental O2 PRN, will wean as tolerated  - Albuterol INH Q4h scheduled & PRN   - MVI & ascorbic acid 500mg PO BID  -Anti-tussives for cough  - Acetaminophen Q6hr PRN fever/headache  - Loperamide PRN viral diarrhea  - VTE PPx: will hold for now in setting of active hemoptysis  - PT/OT for debility/weakness

## 2022-08-15 NOTE — ASSESSMENT & PLAN NOTE
-patient presents to ED with complaints of hemoptysis x 1 day  -CXR chest revealed Large cavitary opacity in left lower lobe  -CTA chest revealed worsening of left lower lobe and right upper lobe bronchiectasis, bronchial thickening and coalescent cavitation  -T&S completed  -Monitor and trend CBC  -Pulmonary consulted and appreciate recs  -Sputum cultures pending  -Airborne/droplet precautions initiated

## 2022-08-15 NOTE — ASSESSMENT & PLAN NOTE
-Na 124 on admit  -With new findings on CT chest, suspect may be related to malignancy?  -Received 1xL NS bolus in ED  -BMP q6 hours  -Neuro checks q4hr  -Check Serum Osmolality, Urine Osmolality, Urine Sodium    -Goal is to raise serum sodium 4-6 mEq in a 24 hour period

## 2022-08-15 NOTE — H&P
[facility] Emergency [facility] Medicine  History & Physical    Patient Name: Hai Gonzales  MRN: [id number]  Patient Class: IP- Inpatient  Admission Date: 8/15/2022  Attending Physician: Johnny Garrett MD   Primary Care Provider: Primary Doctor No         Patient information was obtained from patient, past medical records and ER records.     Subjective:     Principal Problem:Hemoptysis    Chief Complaint:   Chief Complaint   Patient presents with    Hematemesis     Pt reports throwing up bright red blood since 0430 this morning. Pt also reporting subjective fever for appx 2 months.         HPI: Hai Gonzales is a 58 y/o male with tobacco abuse and no other significant history presents to [facility] ED with complaints of hemoptysis that started at 0430 today. His associated symptoms are night sweats, chills, intermittent subjective fever, and decreased appetite over the past two months. He states he has intermittent non-bloody diarrheal episodes over the past week. He also states he loss an approximated 20lbs over the past month. He denies history of homelessness, history of incarceration, or history of recent travel out of the country. He admits he is living with his girlfriend who he states he actively sick, however, he does not know the cause of her illness. 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 He reports smoking history of 1.5 packs daily x 40 years (60 PY).     Of note: CT chest without contrast on 5/24/2022 revealed Scattered cavitary lesions throughout the pulmonary parenchyma, most prominently involving the left lower lobe near the fissure. Patient had a pulmonary referral, and an attempt was made to reach out to the patient, however the number provided was the wrong number.     In the ED: BP 94/62   Pulse 89   Temp 99.5 °F (37.5 °C) (Oral)   Resp 20   Wt 68 kg (150 lb)   SpO2 97%   BMI 20.34 kg/m² Labswere  remarkable for WBC 15.71, H/H 13.4/39.4, Na 124, Chloride 87. COVID-19 positive.   CTA chest revealed Worsening of left lower lobe and right upper lobe bronchiectasis, bronchial thickening and coalescent cavitation. He received 500ml LR x1, Duo-neb x1, Prednisone 40mg PO x1, 80mg IV protonix, 1g Rocephin in ED. Pulmonary is consulted. Will admit to hospital medicine for further evaluation and treatment.       No past medical history on file.    No past surgical history on file.    Review of patient's allergies indicates:  No Known Allergies    No current facility-administered medications on file prior to encounter.     Current Outpatient Medications on File Prior to Encounter   Medication Sig    LIDOcaine (LIDODERM) 5 % Place 1 patch onto the skin once daily. Remove & Discard patch within 12 hours or as directed by MD    naproxen (NAPROSYN) 500 MG tablet Take 1 tablet (500 mg total) by mouth 2 (two) times daily with meals.     Family History    None       Tobacco Use    Smoking status: Not on file    Smokeless tobacco: Not on file   Substance and Sexual Activity    Alcohol use: Not on file    Drug use: Not on file    Sexual activity: Not on file     Review of Systems   Constitutional:  Positive for activity change, appetite change, chills, fatigue, fever and unexpected weight change. Negative for diaphoresis.   HENT:  Negative for hearing loss and sore throat.    Eyes:  Negative for visual disturbance.   Respiratory:  Positive for cough and wheezing.    Cardiovascular:  Negative for chest pain, palpitations and leg swelling.   Gastrointestinal:  Positive for diarrhea. Negative for abdominal pain, constipation, nausea and vomiting.   Genitourinary:  Negative for difficulty urinating, dysuria, flank pain and hematuria.   Musculoskeletal:  Negative for back pain.   Skin:  Negative for rash and wound.   Neurological:  Negative for dizziness, weakness and headaches.   Psychiatric/Behavioral:  Negative for  agitation and confusion.    Objective:     Vital Signs (Most Recent):  Temp: 99.5 °F (37.5 °C) (08/15/22 1042)  Pulse: 89 (08/15/22 1554)  Resp: 20 (08/15/22 1554)  BP: 94/62 (08/15/22 1553)  SpO2: 97 % (08/15/22 1554) Vital Signs (24h Range):  Temp:  [99.5 °F (37.5 °C)] 99.5 °F (37.5 °C)  Pulse:  [] 89  Resp:  [18-20] 20  SpO2:  [97 %-98 %] 97 %  BP: ()/(62-66) 94/62     Weight: 68 kg (150 lb)  Body mass index is 20.34 kg/m².    Physical Exam  Vitals and nursing note reviewed.   Constitutional:       General: He is not in acute distress.     Appearance: He is cachectic. He is ill-appearing.      Comments: Appears fatigue   HENT:      Head: Normocephalic and atraumatic.      Mouth/Throat:      Mouth: Mucous membranes are moist.   Eyes:      Extraocular Movements: Extraocular movements intact.      Pupils: Pupils are equal, round, and reactive to light.   Cardiovascular:      Rate and Rhythm: Normal rate and regular rhythm.      Pulses: Normal pulses.      Heart sounds: Normal heart sounds. No murmur heard.    No friction rub. No gallop.   Pulmonary:      Effort: Pulmonary effort is normal. No respiratory distress.      Breath sounds: No wheezing or rhonchi.      Comments: Expiratory wheezing appreciated bilaterally; good air movement  Abdominal:      General: Bowel sounds are normal. There is no distension.      Palpations: Abdomen is soft.      Tenderness: There is no abdominal tenderness. There is no guarding or rebound.   Musculoskeletal:         General: No swelling.      Cervical back: Normal range of motion and neck supple.      Right lower leg: No edema.      Left lower leg: No edema.   Skin:     General: Skin is warm and dry.      Capillary Refill: Capillary refill takes less than 2 seconds.      Findings: No bruising, erythema or rash.   Neurological:      General: No focal deficit present.      Mental Status: He is oriented to person, place, and time.      GCS: GCS eye subscore is 4. GCS verbal  subscore is 5. GCS motor subscore is 6.   Psychiatric:         Mood and Affect: Mood normal. Affect is flat.         Behavior: Behavior is cooperative.         CRANIAL NERVES     CN III, IV, VI   Pupils are equal, round, and reactive to light.     Significant Labs: All pertinent labs within the past 24 hours have been reviewed.  Recent Results (from the past 24 hour(s))   CBC auto differential    Collection Time: 08/15/22 10:54 AM   Result Value Ref Range    WBC 15.71 (H) 3.90 - 12.70 K/uL    RBC 4.42 (L) 4.60 - 6.20 M/uL    Hemoglobin 13.4 (L) 14.0 - 18.0 g/dL    Hematocrit 39.4 (L) 40.0 - 54.0 %    MCV 89 82 - 98 fL    MCH 30.3 27.0 - 31.0 pg    MCHC 34.0 32.0 - 36.0 g/dL    RDW 13.6 11.5 - 14.5 %    Platelets 333 150 - 450 K/uL    MPV 8.9 (L) 9.2 - 12.9 fL    Immature Granulocytes 0.6 (H) 0.0 - 0.5 %    Gran # (ANC) 13.5 (H) 1.8 - 7.7 K/uL    Immature Grans (Abs) 0.09 (H) 0.00 - 0.04 K/uL    Lymph # 1.0 1.0 - 4.8 K/uL    Mono # 1.1 (H) 0.3 - 1.0 K/uL    Eos # 0.0 0.0 - 0.5 K/uL    Baso # 0.04 0.00 - 0.20 K/uL    nRBC 0 0 /100 WBC    Gran % 85.5 (H) 38.0 - 73.0 %    Lymph % 6.4 (L) 18.0 - 48.0 %    Mono % 7.1 4.0 - 15.0 %    Eosinophil % 0.1 0.0 - 8.0 %    Basophil % 0.3 0.0 - 1.9 %    Differential Method Automated    Comprehensive metabolic panel    Collection Time: 08/15/22 10:54 AM   Result Value Ref Range    Sodium 124 (L) 136 - 145 mmol/L    Potassium 4.7 3.5 - 5.1 mmol/L    Chloride 87 (L) 95 - 110 mmol/L    CO2 26 23 - 29 mmol/L    Glucose 99 70 - 110 mg/dL    BUN 9 6 - 20 mg/dL    Creatinine 0.7 0.5 - 1.4 mg/dL    Calcium 9.0 8.7 - 10.5 mg/dL    Total Protein 7.3 6.0 - 8.4 g/dL    Albumin 2.5 (L) 3.5 - 5.2 g/dL    Total Bilirubin 0.2 0.1 - 1.0 mg/dL    Alkaline Phosphatase 57 55 - 135 U/L    AST 17 10 - 40 U/L    ALT 7 (L) 10 - 44 U/L    Anion Gap 11 8 - 16 mmol/L    eGFR >60 >60 mL/min/1.73 m^2   Type & Screen    Collection Time: 08/15/22 10:54 AM   Result Value Ref Range    Group & Rh O POS     Indirect  Charlie NEG    Lipase    Collection Time: 08/15/22 10:54 AM   Result Value Ref Range    Lipase 16 4 - 60 U/L   Protime-INR    Collection Time: 08/15/22 10:54 AM   Result Value Ref Range    Prothrombin Time 11.9 9.0 - 12.5 sec    INR 1.2 0.8 - 1.2   POCT COVID-19 Rapid Screening    Collection Time: 08/15/22 11:24 AM   Result Value Ref Range    POC Rapid COVID Positive (A) Negative     Acceptable Yes        Significant Imaging: I have reviewed all pertinent imaging results/findings within the past 24 hours.    Assessment/Plan:     * Hemoptysis  -patient presents to ED with complaints of hemoptysis x 1 day  -CXR chest revealed Large cavitary opacity in left lower lobe  -CTA chest revealed worsening of left lower lobe and right upper lobe bronchiectasis, bronchial thickening and coalescent cavitation  -T&S completed  -Monitor and trend CBC  -Pulmonary consulted and appreciate recs  -Sputum cultures pending  -Airborne/droplet precautions initiated        COVID-19 virus infection  - COVID positive 8/15/22; initiated on protocol  - Isolation: Airborne/Droplet. Surgical mask on patient. Notify Infection Control  - CXR with large cavitary opacity in left lower lobe, no oxygen requirement on exam  - Monitor on Telemetry  -Received 1g Rocephin in ED x1  -Will continue Rocephin and Azithromycin due to concern for superimposed CAP  - initiated on dexamethasone, will continue for 10d course  - initiated on remdesivir x3d; daily CMP  -Procalcitonin pending  - CRP pending  -Covid Labs pending  - Order RT consult via Respiratory Communication for COVID Protocols  - Incentive Spirometer Q4h  - Continuous Pulse Oximetry, goal SpO2 >90%  - supplemental O2 PRN, will wean as tolerated  - Albuterol INH Q4h scheduled & PRN   - MVI & ascorbic acid 500mg PO BID  -Anti-tussives for cough  - Acetaminophen Q6hr PRN fever/headache  - Loperamide PRN viral diarrhea  - VTE PPx: will hold for now in setting of active hemoptysis  -  PT/OT for debility/weakness          Leukocytosis  -likely reactive in setting of COVID-19 infection and concern for underlying infectious superimposed pulmonary infection  -Procalcitonin pending  -Received 1g Rocephin in ED  -Will continue azithromycin/rocephin for CAP coverage for now; will de-escalate as needed  -Continue to monitor and trend CBC      Hyponatremia  -Na 124 on admit  -With new findings on CT chest, suspect may be related to malignancy?  -Received 1xL NS bolus in ED  -BMP q6 hours  -Neuro checks q4hr  -Check Serum Osmolality, Urine Osmolality, Urine Sodium    -Goal is to raise serum sodium 4-6 mEq in a 24 hour period        Nicotine dependence  -reports nicotine dependence with cigarettes (60 pack year history)  -Dangers of cigarette smoking were reviewed with patient in detail for 10 minutes and patient was encouraged to quit.  -Nicotine replacement options were discussed.        Abnormal CT of the chest  -CTA chest revealed worsening of left lower lobe and right upper lobe bronchiectasis, bronchial thickening and coalescent cavitation  -Concern for TB vs non-tuberculous mycobacterial infection vs fungal infection  -Sputum culture pending  -Pulmonary consulted  -Airborne/Droplet precautions initiated      VTE Risk Mitigation (From admission, onward)         Ordered     Reason for No Pharmacological VTE Prophylaxis  Once        Question:  Reasons:  Answer:  Risk of Bleeding    08/15/22 1518     IP VTE HIGH RISK PATIENT  Once         08/15/22 1518     Place sequential compression device  Until discontinued         08/15/22 1518                 Dorothy Virk, FANTASMA, ACNPC-AG, CCRN  Hospitalist  Department of Hospital Medicine  [facility]   Pager: [phone]

## 2022-08-15 NOTE — Clinical Note
Diagnosis: Hyponatremia [198519]   Admitting Provider:: VANGIE IVEY [[id number]]   Future Attending Provider: VANGIE IVEY [[id number]]   Reason for IP Medical Treatment  (Clinical interventions that can only be accomplished in the IP setting? ) :: IVF   Estimated Length of Stay:: 2 midnights   I certify that Inpatient services for greater than or equal to 2 midnights are medically necessary:: Yes   Plans for Post-Acute care--if anticipated (pick the single best option):: A. No post acute care anticipated at this time

## 2022-08-15 NOTE — ASSESSMENT & PLAN NOTE
-CTA chest revealed worsening of left lower lobe and right upper lobe bronchiectasis, bronchial thickening and coalescent cavitation  -Concern for TB vs non-tuberculous mycobacterial infection vs fungal infection  -Sputum culture pending  -Pulmonary consulted  -Airborne/Droplet precautions initiated

## 2022-08-15 NOTE — HPI
Hai Gonzales is a 56 y/o male with tobacco abuse and no other significant history presents to [facility] ED with complaints of hemoptysis that started at 0430 today. His associated symptoms are night sweats, chills, intermittent subjective fever, and decreased appetite over the past two months. He states he has intermittent non-bloody diarrheal episodes over the past week. He also states he loss an approximated 20lbs over the past month. He denies history of homelessness, history of incarceration, or history of recent travel out of the country. He admits he is living with his girlfriend who he states he actively sick, however, he does not know the cause of her illness. 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 He reports smoking history of 1.5 packs daily x 40 years (60 PY).     Of note: CT chest without contrast on 5/24/2022 revealed Scattered cavitary lesions throughout the pulmonary parenchyma, most prominently involving the left lower lobe near the fissure. Patient had a pulmonary referral, and an attempt was made to reach out to the patient, however the number provided was the wrong number.     In the ED: BP 94/62   Pulse 89   Temp 99.5 °F (37.5 °C) (Oral)   Resp 20   Wt 68 kg (150 lb)   SpO2 97%   BMI 20.34 kg/m² Labswere remarkable for WBC 15.71, H/H 13.4/39.4, Na 124, Chloride 87. COVID-19 positive.   CTA chest revealed Worsening of left lower lobe and right upper lobe bronchiectasis, bronchial thickening and coalescent cavitation. He received 500ml LR x1, Duo-neb x1, Prednisone 40mg PO x1, 80mg IV protonix, 1g Rocephin in ED. Pulmonary is consulted. Will admit to hospital medicine for further evaluation and treatment.

## 2022-08-16 PROBLEM — D72.829 LEUKOCYTOSIS: Status: RESOLVED | Noted: 2022-08-15 | Resolved: 2022-08-16

## 2022-08-16 LAB
ALBUMIN SERPL BCP-MCNC: 2.4 G/DL (ref 3.5–5.2)
ALP SERPL-CCNC: 59 U/L (ref 55–135)
ALT SERPL W/O P-5'-P-CCNC: 10 U/L (ref 10–44)
ANION GAP SERPL CALC-SCNC: 10 MMOL/L (ref 8–16)
ANION GAP SERPL CALC-SCNC: 11 MMOL/L (ref 8–16)
ANION GAP SERPL CALC-SCNC: 12 MMOL/L (ref 8–16)
AORTIC ROOT ANNULUS: 2.08 CM
AST SERPL-CCNC: 18 U/L (ref 10–40)
AV INDEX (PROSTH): 0.79
AV MEAN GRADIENT: 2 MMHG
AV PEAK GRADIENT: 6 MMHG
AV VALVE AREA: 2.4 CM2
AV VELOCITY RATIO: 0.69
BASOPHILS # BLD AUTO: 0.02 K/UL (ref 0–0.2)
BASOPHILS NFR BLD: 0.2 % (ref 0–1.9)
BILIRUB SERPL-MCNC: 0.3 MG/DL (ref 0.1–1)
BSA FOR ECHO PROCEDURE: 1.86 M2
BUN SERPL-MCNC: 10 MG/DL (ref 6–20)
BUN SERPL-MCNC: 10 MG/DL (ref 6–20)
BUN SERPL-MCNC: 11 MG/DL (ref 6–20)
CALCIUM SERPL-MCNC: 8.5 MG/DL (ref 8.7–10.5)
CALCIUM SERPL-MCNC: 8.5 MG/DL (ref 8.7–10.5)
CALCIUM SERPL-MCNC: 8.9 MG/DL (ref 8.7–10.5)
CHLORIDE SERPL-SCNC: 92 MMOL/L (ref 95–110)
CHLORIDE SERPL-SCNC: 92 MMOL/L (ref 95–110)
CHLORIDE SERPL-SCNC: 93 MMOL/L (ref 95–110)
CO2 SERPL-SCNC: 23 MMOL/L (ref 23–29)
CO2 SERPL-SCNC: 27 MMOL/L (ref 23–29)
CO2 SERPL-SCNC: 29 MMOL/L (ref 23–29)
CREAT SERPL-MCNC: 0.6 MG/DL (ref 0.5–1.4)
CREAT SERPL-MCNC: 0.6 MG/DL (ref 0.5–1.4)
CREAT SERPL-MCNC: 0.7 MG/DL (ref 0.5–1.4)
CV ECHO LV RWT: 0.32 CM
DIFFERENTIAL METHOD: ABNORMAL
DOP CALC AO PEAK VEL: 1.18 M/S
DOP CALC AO VTI: 20.38 CM
DOP CALC LVOT AREA: 3 CM2
DOP CALC LVOT DIAMETER: 1.96 CM
DOP CALC LVOT PEAK VEL: 0.82 M/S
DOP CALC LVOT STROKE VOLUME: 48.85 CM3
DOP CALC MV VTI: 18.14 CM
DOP CALCLVOT PEAK VEL VTI: 16.2 CM
E WAVE DECELERATION TIME: 148.63 MSEC
E/A RATIO: 1.15
E/E' RATIO: 6.36 M/S
ECHO LV POSTERIOR WALL: 0.75 CM (ref 0.6–1.1)
EJECTION FRACTION: 55 %
EOSINOPHIL # BLD AUTO: 0 K/UL (ref 0–0.5)
EOSINOPHIL NFR BLD: 0 % (ref 0–8)
ERYTHROCYTE [DISTWIDTH] IN BLOOD BY AUTOMATED COUNT: 13.5 % (ref 11.5–14.5)
EST. GFR  (NO RACE VARIABLE): >60 ML/MIN/1.73 M^2
FRACTIONAL SHORTENING: 34 % (ref 28–44)
GLUCOSE SERPL-MCNC: 170 MG/DL (ref 70–110)
GLUCOSE SERPL-MCNC: 86 MG/DL (ref 70–110)
GLUCOSE SERPL-MCNC: 88 MG/DL (ref 70–110)
HCT VFR BLD AUTO: 38.9 % (ref 40–54)
HGB BLD-MCNC: 13.1 G/DL (ref 14–18)
IMM GRANULOCYTES # BLD AUTO: 0.07 K/UL (ref 0–0.04)
IMM GRANULOCYTES NFR BLD AUTO: 0.8 % (ref 0–0.5)
INTERVENTRICULAR SEPTUM: 0.64 CM (ref 0.6–1.1)
LA MAJOR: 4.19 CM
LA WIDTH: 3.19 CM
LEFT ATRIUM SIZE: 3.33 CM
LEFT ATRIUM VOLUME INDEX MOD: 15.6 ML/M2
LEFT ATRIUM VOLUME MOD: 29.48 CM3
LEFT INTERNAL DIMENSION IN SYSTOLE: 3.14 CM (ref 2.1–4)
LEFT VENTRICLE DIASTOLIC VOLUME INDEX: 55.83 ML/M2
LEFT VENTRICLE DIASTOLIC VOLUME: 105.51 ML
LEFT VENTRICLE MASS INDEX: 55 G/M2
LEFT VENTRICLE SYSTOLIC VOLUME INDEX: 20.7 ML/M2
LEFT VENTRICLE SYSTOLIC VOLUME: 39.09 ML
LEFT VENTRICULAR INTERNAL DIMENSION IN DIASTOLE: 4.76 CM (ref 3.5–6)
LEFT VENTRICULAR MASS: 104.4 G
LV LATERAL E/E' RATIO: 7 M/S
LV SEPTAL E/E' RATIO: 5.83 M/S
LYMPHOCYTES # BLD AUTO: 1.4 K/UL (ref 1–4.8)
LYMPHOCYTES NFR BLD: 15 % (ref 18–48)
MAGNESIUM SERPL-MCNC: 1.9 MG/DL (ref 1.6–2.6)
MCH RBC QN AUTO: 30.3 PG (ref 27–31)
MCHC RBC AUTO-ENTMCNC: 33.7 G/DL (ref 32–36)
MCV RBC AUTO: 90 FL (ref 82–98)
MONOCYTES # BLD AUTO: 0.8 K/UL (ref 0.3–1)
MONOCYTES NFR BLD: 8.3 % (ref 4–15)
MV MEAN GRADIENT: 1 MMHG
MV PEAK A VEL: 0.61 M/S
MV PEAK E VEL: 0.7 M/S
MV PEAK GRADIENT: 3 MMHG
MV STENOSIS PRESSURE HALF TIME: 48 MS
MV VALVE AREA BY CONTINUITY EQUATION: 2.69 CM2
MV VALVE AREA P 1/2 METHOD: 4.58 CM2
NEUTROPHILS # BLD AUTO: 7.1 K/UL (ref 1.8–7.7)
NEUTROPHILS NFR BLD: 75.7 % (ref 38–73)
NRBC BLD-RTO: 0 /100 WBC
OSMOLALITY SERPL: 274 MOSM/KG (ref 280–300)
OSMOLALITY UR: 230 MOSM/KG (ref 50–1200)
PHOSPHATE SERPL-MCNC: 3.5 MG/DL (ref 2.7–4.5)
PISA TR MAX VEL: 2.46 M/S
PLATELET # BLD AUTO: 357 K/UL (ref 150–450)
PMV BLD AUTO: 9 FL (ref 9.2–12.9)
POTASSIUM SERPL-SCNC: 3.6 MMOL/L (ref 3.5–5.1)
POTASSIUM SERPL-SCNC: 4.4 MMOL/L (ref 3.5–5.1)
POTASSIUM SERPL-SCNC: 4.5 MMOL/L (ref 3.5–5.1)
PROCALCITONIN SERPL IA-MCNC: 0.15 NG/ML
PROT SERPL-MCNC: 6.3 G/DL (ref 6–8.4)
RA MAJOR: 3.52 CM
RA PRESSURE: 3 MMHG
RA WIDTH: 3.33 CM
RBC # BLD AUTO: 4.33 M/UL (ref 4.6–6.2)
SODIUM SERPL-SCNC: 127 MMOL/L (ref 136–145)
SODIUM SERPL-SCNC: 131 MMOL/L (ref 136–145)
SODIUM SERPL-SCNC: 131 MMOL/L (ref 136–145)
TDI LATERAL: 0.1 M/S
TDI SEPTAL: 0.12 M/S
TDI: 0.11 M/S
TR MAX PG: 24 MMHG
TRICUSPID ANNULAR PLANE SYSTOLIC EXCURSION: 1.9 CM
TV REST PULMONARY ARTERY PRESSURE: 27 MMHG
WBC # BLD AUTO: 9.31 K/UL (ref 3.9–12.7)

## 2022-08-16 PROCEDURE — 25000003 PHARM REV CODE 250

## 2022-08-16 PROCEDURE — 87015 SPECIMEN INFECT AGNT CONCNTJ: CPT | Mod: 59 | Performed by: HOSPITALIST

## 2022-08-16 PROCEDURE — 84100 ASSAY OF PHOSPHORUS: CPT

## 2022-08-16 PROCEDURE — 87149 DNA/RNA DIRECT PROBE: CPT | Mod: 59 | Performed by: HOSPITALIST

## 2022-08-16 PROCEDURE — 25000003 PHARM REV CODE 250: Performed by: HOSPITALIST

## 2022-08-16 PROCEDURE — 94640 AIRWAY INHALATION TREATMENT: CPT

## 2022-08-16 PROCEDURE — 97530 THERAPEUTIC ACTIVITIES: CPT

## 2022-08-16 PROCEDURE — 63600175 PHARM REV CODE 636 W HCPCS: Performed by: HOSPITALIST

## 2022-08-16 PROCEDURE — 63600175 PHARM REV CODE 636 W HCPCS

## 2022-08-16 PROCEDURE — 36415 COLL VENOUS BLD VENIPUNCTURE: CPT

## 2022-08-16 PROCEDURE — 87186 SC STD MICRODIL/AGAR DIL: CPT | Performed by: HOSPITALIST

## 2022-08-16 PROCEDURE — 80053 COMPREHEN METABOLIC PANEL: CPT

## 2022-08-16 PROCEDURE — 11000001 HC ACUTE MED/SURG PRIVATE ROOM

## 2022-08-16 PROCEDURE — 87116 MYCOBACTERIA CULTURE: CPT | Performed by: HOSPITALIST

## 2022-08-16 PROCEDURE — 63700000 PHARM REV CODE 250 ALT 637 W/O HCPCS

## 2022-08-16 PROCEDURE — 80048 BASIC METABOLIC PNL TOTAL CA: CPT | Mod: XB

## 2022-08-16 PROCEDURE — 94799 UNLISTED PULMONARY SVC/PX: CPT

## 2022-08-16 PROCEDURE — 84145 PROCALCITONIN (PCT): CPT

## 2022-08-16 PROCEDURE — 25000003 PHARM REV CODE 250: Performed by: STUDENT IN AN ORGANIZED HEALTH CARE EDUCATION/TRAINING PROGRAM

## 2022-08-16 PROCEDURE — 87206 SMEAR FLUORESCENT/ACID STAI: CPT | Performed by: HOSPITALIST

## 2022-08-16 PROCEDURE — 99900035 HC TECH TIME PER 15 MIN (STAT)

## 2022-08-16 PROCEDURE — 87556 M.TUBERCULO DNA AMP PROBE: CPT | Performed by: STUDENT IN AN ORGANIZED HEALTH CARE EDUCATION/TRAINING PROGRAM

## 2022-08-16 PROCEDURE — 83930 ASSAY OF BLOOD OSMOLALITY: CPT

## 2022-08-16 PROCEDURE — 27000207 HC ISOLATION

## 2022-08-16 PROCEDURE — 97165 OT EVAL LOW COMPLEX 30 MIN: CPT

## 2022-08-16 PROCEDURE — 87118 MYCOBACTERIC IDENTIFICATION: CPT | Performed by: HOSPITALIST

## 2022-08-16 PROCEDURE — 83735 ASSAY OF MAGNESIUM: CPT

## 2022-08-16 PROCEDURE — 87389 HIV-1 AG W/HIV-1&-2 AB AG IA: CPT

## 2022-08-16 PROCEDURE — 97161 PT EVAL LOW COMPLEX 20 MIN: CPT

## 2022-08-16 PROCEDURE — 85025 COMPLETE CBC W/AUTO DIFF WBC: CPT

## 2022-08-16 RX ORDER — AZITHROMYCIN 250 MG/1
500 TABLET, FILM COATED ORAL DAILY
Status: COMPLETED | OUTPATIENT
Start: 2022-08-16 | End: 2022-08-17

## 2022-08-16 RX ORDER — POTASSIUM CHLORIDE 20 MEQ/1
40 TABLET, EXTENDED RELEASE ORAL ONCE
Status: COMPLETED | OUTPATIENT
Start: 2022-08-16 | End: 2022-08-16

## 2022-08-16 RX ADMIN — REMDESIVIR 100 MG: 100 INJECTION, POWDER, LYOPHILIZED, FOR SOLUTION INTRAVENOUS at 12:08

## 2022-08-16 RX ADMIN — DEXAMETHASONE 6 MG: 4 TABLET ORAL at 08:08

## 2022-08-16 RX ADMIN — THERA TABS 1 TABLET: TAB at 08:08

## 2022-08-16 RX ADMIN — CEFTRIAXONE 1 G: 1 INJECTION, SOLUTION INTRAVENOUS at 12:08

## 2022-08-16 RX ADMIN — OXYCODONE HYDROCHLORIDE AND ACETAMINOPHEN 500 MG: 500 TABLET ORAL at 08:08

## 2022-08-16 RX ADMIN — POTASSIUM CHLORIDE 40 MEQ: 1500 TABLET, EXTENDED RELEASE ORAL at 08:08

## 2022-08-16 RX ADMIN — TRANEXAMIC ACID 500 MG: 100 INJECTION, SOLUTION INTRAVENOUS at 02:08

## 2022-08-16 RX ADMIN — AZITHROMYCIN MONOHYDRATE 500 MG: 250 TABLET ORAL at 12:08

## 2022-08-16 NOTE — PLAN OF CARE
Problem: Physical Therapy  Goal: Physical Therapy Goal  Description: Goals to be met by: 22     Patient will increase functional independence with mobility by performin. Gait  x 50 feet with Modified [place] and Supervision using No Assistive Device.  MET 22    Outcome: Met     PT Eval performed, note to follow. Pt is Mod I for bed mobility and ambulating in room with SPV-Mod I and no AD. No LOB noted. No skilled acute PT needs identified. Recommending d/c home with no DME needs.

## 2022-08-16 NOTE — PLAN OF CARE
OT pee performed this date with PT, pt agreeable to therapy. Pt educated on role of OT and POC. Pt performing bed mobility with Rogelio. Pt performing functional mobility in room with Sup and no AD. No further acute OT needs noted at this time. D/c OT.     Problem: Occupational Therapy  Goal: Occupational Therapy Goal  Description: Goals to be met by: 8/16/2022     Patient will increase functional independence with ADLs by performing:    LE Dressing with Modified [place]. MET 8/16/2022  Supine to sit with Modified [place]. MET 8/16/2022  Step transfer with Supervision. MET 8/16/2022    Outcome: Ongoing, Progressing

## 2022-08-16 NOTE — SUBJECTIVE & OBJECTIVE
Interval History: continued cough productive of bloody sputum. Reports shortness of breath, but not requiring supplemental O2 presently. 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    Review of Systems   Constitutional:  Positive for fatigue and fever.   Respiratory:  Positive for cough (hemoptysis) and shortness of breath.    Cardiovascular:  Negative for chest pain and leg swelling.   Gastrointestinal:  Negative for nausea and vomiting.   Neurological:  Positive for weakness.   Objective:     Vital Signs (Most Recent):  Temp: 97.7 °F (36.5 °C) (08/16/22 0754)  Pulse: 97 (08/16/22 0800)  Resp: 18 (08/16/22 0754)  BP: 110/60 (08/16/22 0754)  SpO2: 98 % (08/16/22 0754) Vital Signs (24h Range):  Temp:  [97.4 °F (36.3 °C)-98.7 °F (37.1 °C)] 97.7 °F (36.5 °C)  Pulse:  [81-97] 97  Resp:  [16-20] 18  SpO2:  [95 %-98 %] 98 %  BP: ()/(51-63) 110/60     Weight: 68 kg (150 lb)  Body mass index is 20.34 kg/m².    Intake/Output Summary (Last 24 hours) at 8/16/2022 1101  Last data filed at 8/15/2022 1342  Gross per 24 hour   Intake 1000 ml   Output --   Net 1000 ml      Physical Exam  Vitals and nursing note reviewed.   Constitutional:       General: He is not in acute distress.     Appearance: He is cachectic. He is ill-appearing.      Comments: Appears fatigue   HENT:      Head: Normocephalic and atraumatic.      Mouth/Throat:      Mouth: Mucous membranes are moist.   Eyes:      Extraocular Movements: Extraocular movements intact.      Pupils: Pupils are equal, round, and reactive to light.   Cardiovascular:      Rate and Rhythm: Normal rate and regular rhythm.      Pulses: Normal pulses.      Heart sounds: Normal heart sounds. No murmur heard.    No friction rub. No gallop.   Pulmonary:      Effort: Pulmonary effort is normal. No respiratory distress.      Breath sounds: No wheezing or rhonchi.      Comments: Expiratory wheezing appreciated  bilaterally; good air movement  Abdominal:      General: Bowel sounds are normal. There is no distension.      Palpations: Abdomen is soft.      Tenderness: There is no abdominal tenderness. There is no guarding or rebound.   Musculoskeletal:         General: No swelling.      Cervical back: Normal range of motion and neck supple.      Right lower leg: No edema.      Left lower leg: No edema.   Skin:     General: Skin is warm and dry.      Capillary Refill: Capillary refill takes less than 2 seconds.      Findings: No bruising, erythema or rash.   Neurological:      General: No focal deficit present.      Mental Status: He is oriented to person, place, and time.      GCS: GCS eye subscore is 4. GCS verbal subscore is 5. GCS motor subscore is 6.   Psychiatric:         Mood and Affect: Mood normal. Affect is flat.         Behavior: Behavior is cooperative.       Significant Labs: All pertinent labs within the past 24 hours have been reviewed.    Significant Imaging: I have reviewed all pertinent imaging results/findings within the past 24 hours.

## 2022-08-16 NOTE — PT/OT/SLP EVAL
Occupational Therapy   Evaluation & Discharge Note    Name: Hai Gonzales  MRN: [id number]  Admitting Diagnosis:  Hemoptysis  Recent Surgery: * No surgery found *      Recommendations:     Discharge Recommendations: home  Discharge Equipment Recommendations:  none  Barriers to discharge:  None    Assessment:     Hai Gonzales is a 57 y.o. male with a medical diagnosis of Hemoptysis.  He presents with The primary encounter diagnosis was Cavitary lesion of lung. Diagnoses of Nausea & vomiting, Hemoptysis, Pneumonia of left upper lobe due to infectious organism, COVID-19 virus infection, Hyponatremia, Dehydration, Chest pain, and Abnormal CT of the chest were also pertinent to this visit. Performance deficits affecting function: impaired endurance.      OT eval performed this date with PT, pt agreeable to therapy. Pt educated on role of OT and POC. Pt performing bed mobility with Rogelio. Pt performing functional mobility in room with Sup and no AD. No further acute OT needs noted at this time. D/c OT.     Rehab Prognosis: Good    Plan:   · Plan of Care Expires: 08/16/22  · Plan of Care Reviewed with: patient    Subjective     Chief Complaint: Pt coughing up bloody sputum   Patient/Family Comments/goals: Return to PLOF    Occupational Profile:  Pt lives with his fiance in a 2nd floor apt with 2 flights of stairs and R HR and T/S combo  Prior to admission, patients level of function was Independent, driving, and working in construction.  Equipment used at home: none.  DME owned (not currently used): none.    Upon discharge, patient will have assistance from [facility].    Pain/Comfort:  · Pain Rating 1: 0/10    Patients cultural, spiritual, Evangelical conflicts given the current situation:  n/a    Objective:     Communicated with: joseline prior to session.  Patient found HOB elevated upon OT entry to room.    General Precautions: Standard, fall   Orthopedic Precautions:N/A   Braces: N/A  Respiratory Status: Room air    Occupational  Performance:    Bed Mobility:    · Patient completed Scooting/Bridging with modified independence  · Patient completed Supine to Sit with modified independence  · Patient completed Sit to Supine with modified independence    Functional Mobility/Transfers:  · Patient completed Sit <> Stand Transfer with modified independence and supervision  with  no assistive device   · Functional Mobility:  Pt performing functional mobility in room with Sup/Rogelio & no AD. No LOB noted.     Activities of Daily Living:  · Lower Body Dressing: modified independence to robert B shoes seated EOB    Cognitive/Visual Perceptual:  Cognitive/Psychosocial Skills:     -       Oriented to: Person, Place, Time and Situation   -       Follows Commands/attention:Follows multistep  commands  -       Memory: No Deficits noted  -       Mood/Affect/Coping skills/emotional control: Cooperative    Physical Exam:  Sensation:    -       Intact  light/touch BUE  Upper Extremity Range of Motion:     -       Right Upper Extremity: WFL  -       Left Upper Extremity: WFL  Upper Extremity Strength:    -       Right Upper Extremity: 4/5  -       Left Upper Extremity: 4/5   Strength:    -       Right Upper Extremity: WFL  -       Left Upper Extremity: WFL    AMPAC 6 Click ADL:  AMPAC Total Score: 24    Treatment & Education:  OT pee performed this date with PT, pt agreeable to therapy.   Pt educated on role of OT and POC.  Pt performing bed mobility with Rogelio.   Pt performing functional mobility in room with Sup and no AD.   No further acute OT needs noted at this time. D/c OT.   Education:    Patient left HOB elevated with all lines intact, call button in reach and nsg notified    GOALS:   Multidisciplinary Problems     Occupational Therapy Goals        Problem: Occupational Therapy    Goal Priority Disciplines Outcome Interventions   Occupational Therapy Goal     OT, PT/OT Ongoing, Progressing    Description: Goals to be met by: 8/16/2022     Patient will  increase functional independence with ADLs by performing:    LE Dressing with Modified [place]. MET 8/16/2022  Supine to sit with Modified [place]. MET 8/16/2022  Step transfer with Supervision. MET 8/16/2022                     History:     No past medical history on file.    No past surgical history on file.    Time Tracking:     OT Date of Treatment: 08/16/22  OT Start Time: 1344  OT Stop Time: 1415  OT Total Time (min): 31 min with PT    Billable Minutes:Evaluation 10  Therapeutic Activity 10    8/16/2022

## 2022-08-16 NOTE — PT/OT/SLP EVAL
Physical Therapy Evaluation, Treatment, and Discharge Note    Patient Name:  Hai Gonzales   MRN:  [id number]    Recommendations:     Discharge Recommendations:  home   Discharge Equipment Recommendations: none   Barriers to discharge: None    Assessment:     Hai Gonzales is a 57 y.o. male admitted with a medical diagnosis of Hemoptysis. .  At this time, patient is functioning at their prior level of function and does not require further acute PT services.     Pt is Mod I for bed mobility and ambulating in room with SPV-Mod I and no AD. No LOB noted. No skilled acute PT needs identified. Recommending d/c home with no DME needs.     Recent Surgery: * No surgery found *      Plan:     During this hospitalization, patient does not require further acute PT services.  Please re-consult if situation changes.      Subjective     Chief Complaint: None  Patient/Family Comments/goals: Return to PLOF  Pain/Comfort:  · Pain Rating 1: 0/10    Patients cultural, spiritual, Yazidi conflicts given the current situation: no    Living Environment:  Pt lives with his fiance in a 2nd floor apt with 2 flights of stairs and R HR and T/S combo  Prior to admission, patients level of function was Independent, driving, and working in construction.  Equipment used at home: none.  DME owned (not currently used): none.  Upon discharge, patient will have assistance from [facility].    Objective:     Communicated with nsg prior to session.  Patient found HOB elevated with   upon PT entry to room.    General Precautions: Standard, fall   Orthopedic Precautions:N/A   Braces: N/A    Respiratory Status: Room air    Exams:  · Cognitive Exam:  Patient is oriented to Person, Place, Time and Situation  · Postural Exam:  Patient presented with the following abnormalities:    · -       Rounded shoulders  · Skin Integrity/Edema:      · -       Skin integrity: Visible skin intact  · RLE ROM: WFL  · RLE Strength: WFL  · LLE ROM: WFL  · LLE Strength:  WFL    Functional Mobility:  · Bed Mobility:     · Scooting: modified independence  · Supine to Sit: modified independence  · Sit to Supine: modified independence  · Transfers:     · Sit to Stand:  modified independence and supervision with no AD  · Gait: Pt ambulated ~50 ft in room with SPV-Mod I and no AD. No significant gait abnormalities noted and no LOB.     AM-PAC 6 CLICK MOBILITY  Total Score:23       Therapeutic Activities and Exercises:   Pt educated on role of PT/POC, continued exercise and ambulation, importance of OOB mobility and sitting up in chair, and overall safety.  Pt performed mobility as reported above.  Pt declined sitting up in chair and wishing to get some rest.     AM-PAC 6 CLICK MOBILITY  Total Score:23     Patient left HOB elevated with all lines intact, call button in reach and nsg notified.    GOALS:   Multidisciplinary Problems     Physical Therapy Goals     Not on file          Multidisciplinary Problems (Resolved)        Problem: Physical Therapy    Goal Priority Disciplines Outcome Goal Variances Interventions   Physical Therapy Goal   (Resolved)     PT, PT/OT Met            Problem: Physical Therapy    Goal Priority Disciplines Outcome Goal Variances Interventions   Physical Therapy Goal   (Resolved)     PT, PT/OT Met            Problem: Physical Therapy    Goal Priority Disciplines Outcome Goal Variances Interventions   Physical Therapy Goal   (Resolved)     PT, PT/OT Met            Problem: Physical Therapy    Goal Priority Disciplines Outcome Goal Variances Interventions   Physical Therapy Goal   (Resolved)     PT, PT/OT Met     Description: Goals to be met by: 22     Patient will increase functional independence with mobility by performin. Gait  x 50 feet with Modified [place] and Supervision using No Assistive Device.  MET 22                     History:     No past medical history on file.    No past surgical history on file.    Time Tracking:     PT  Received On: 08/16/22  PT Start Time: 0951     PT Stop Time: 1009  PT Total Time (min): 18 min     Billable Minutes: Evaluation 8 and Therapeutic Activity 8 (co-eval with OT)      08/16/2022

## 2022-08-16 NOTE — PROGRESS NOTES
"Pulmonology/Critical Care Progress Note:    Chief complaint: cavitary lung lesions    HPI: Mr. Gonzales is a 58 yo M with a PMH of tobacco use, no other known hx but reports he does not see a PCP. He was found to have cavitary disease on a chest CT in 5/2022 done out of concern for rib fracture after a trauma, and was referred to Pulmonary clinic at [facility] [place] for [place]; however clinic staff was unable to reach him to schedule and had the wrong phone number. He presented today with reported "spitting up of bright red blood since this AM"; CTA Chest shows significant enlargement of his cavitary disease with some air-fluid level concerning for blood. Of note, he has a 60 py smoking hx, 2 months of worsening fatigue, weight loss, night sweats, and intermittent fevers. He has a chronic cough. He denies any history of exposure to TB, no time in shelters, in [facility], in the , or our of the country. 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     In the ED he was given 1L bolus, IV PPI, prednisone, azithro, and ceftriaxone. He was found to be covid +. On my visit to bedside, he appeared tired but interactive, physical exam significant for cachexia, inspiratory squeaks to L lung with expiratory wheezing throughout with good air movement. Small amount of blood streaked phlegm in emesis bag, reports he thinks he has coughed about about 1/2 cup of blood throughout the day today.     Interval History:  Patient continues to saturate well on room air. Still with hemoptysis but not massive. He reports weight loss over past few months. He wishes to quit smoking, TXA here not compatible with neb so will d/c.     Objective:  Vitals: reviewed  General: chronically ill-appearing, but in no distress  HEENT: nonicteric, MMM  CV: RRR, soft systolic murmur, no edema  Resp: no accessory muscle use, diffuse end-expiratory wheezing, inspiratory squeaks and crackles to L mid lung " zone  Abd: soft, NTTP, BS+  Extr: no edema, no cyanosis, no clubbing  Skin: hyperpigmented spots to fingertips, growth to R cheek  Neuro: intact, no deficits    Labs: Impression: leukocytosis resolved, no anemia, hyponatremia improving, hypochloremia, no VIGRINIA, gamma gap +, covid +    Imaging: CTA 8/15:  Impression:  No pulmonary artery thromboembolism.  Worsening of left lower lobe and right upper lobe bronchiectasis, bronchial thickening and coalescent cavitation.  Tuberculosis should be strongly considered as well as non tuberculous mycobacterial infectiond and fungal infection.  Superimposed infection or hemorrhage into the large left lower lobe cavity is not excluded.    Echo: EF 55%, normal RV function, normal diastolic dysfunction, no vegetations and normal CVP    PFTs: none available    Assessment:  1. Cavitary lung lesion enlarging; unknown etiology  2. Hemoptysis; not massive  3. Tobacco use hx  4. Covid +, no hypoxia    Plan/Recommendations:  -differential is still somewhat broad for a cavitary lesion which is continuing to increase over several months associated with systemic symptoms such as weight loss, fatigue, night sweats and subjective fevers. Given multifocal cavitary lesions malignancy is less likely, however still possible. Nontuberculous Mycobacterial infection is high on differential at this time, and TB needs to be evaluated as well. Patient unable to get bronchoscopy while COVID-19 positive, will need definitive pathology once able.    - AFB x3 pending. MTB also ordered  - D/c TXA nebs as not helpful for patient  - At risk for conversation to massive hemoptysis; please save expectorated blood for quantification.  - On remdesivir for COVID-19, does not need steroids at this time given lack of hypoxemia.  - Continue isolation  - if bleeding amount increases significantly please call ICU fellow to bedside, place patient L lung down. Would need emergent IR intervention for embolization of LLL,  unlikely that R apical findings are source of bleed.  -given clinical history and physical exam findings, likely has COPD, can administer duonebs for respiratory distress.    Thank you for the consult. We will continue to follow along with you. Attending attestation to follow.    Wesley Suarez M.D.  [facility] Pulmonary & Critical Care Fellow    Pt seen and examined with Pulmonary/Critical Care team and this note reviewed and validated with the following additional comments:    Scant hemoptysis.  OK to stop TXA. Sputa collected. Minimal SOB.  COVID is not a significant problem at this time.     Sajan Riddle MD  Phone [phone]

## 2022-08-16 NOTE — ASSESSMENT & PLAN NOTE
- COVID positive 8/15/22; initiated on protocol  - Isolation: Airborne/Droplet. Surgical mask on patient. Notify Infection Control  - CXR with large cavitary opacity in left lower lobe, no oxygen requirement on exam  - Monitor on Telemetry  -Received 1g Rocephin in ED x1  -Will continue Rocephin and Azithromycin due to concern for superimposed CAP  - stop dexamethasone due to lack of hypoxia  - initiated on remdesivir x3d; daily CMP  - Order RT consult via Respiratory Communication for COVID Protocols  - Incentive Spirometer Q4h  - Continuous Pulse Oximetry, goal SpO2 >90%  - supplemental O2 PRN, will wean as tolerated  - Albuterol INH Q4h scheduled & PRN   - MVI & ascorbic acid 500mg PO BID  -Anti-tussives for cough  - Acetaminophen Q6hr PRN fever/headache  - Loperamide PRN viral diarrhea  - VTE PPx: will hold for now in setting of active hemoptysis  - PT/OT for debility/weakness

## 2022-08-16 NOTE — ASSESSMENT & PLAN NOTE
-reports nicotine dependence with cigarettes (60 pack year history)  -Dangers of cigarette smoking were reviewed with patient in detail for 10 minutes and patient was encouraged to quit.  -Nicotine replacement options were discussed; refused nicotine patch at this time

## 2022-08-16 NOTE — PROGRESS NOTES
Pharmacist Intervention IV to PO Note    Hai Gonzales is a 57 y.o. male being treated with IV medication azithromycin    Patient Data:    Vital Signs (Most Recent):  Temp: 97.7 °F (36.5 °C) (08/16/22 0754)  Pulse: 97 (08/16/22 0800)  Resp: 18 (08/16/22 0754)  BP: 110/60 (08/16/22 0754)  SpO2: 98 % (08/16/22 0754) Vital Signs (72h Range):  Temp:  [97.4 °F (36.3 °C)-99.5 °F (37.5 °C)]   Pulse:  []   Resp:  [16-20]   BP: ()/(51-66)   SpO2:  [95 %-98 %]      CBC:  Recent Labs   Lab 08/15/22  1054 08/16/22  0616   WBC 15.71* 9.31   RBC 4.42* 4.33*   HGB 13.4* 13.1*   HCT 39.4* 38.9*    357   MCV 89 90   MCH 30.3 30.3   MCHC 34.0 33.7     CMP:     Recent Labs   Lab 08/15/22  1054 08/15/22  1744 08/15/22  2329 08/16/22  0616   GLU 99 134* 170* 86  88   CALCIUM 9.0 8.9 8.5* 8.5*  8.9   ALBUMIN 2.5*  --   --  2.4*   PROT 7.3  --   --  6.3   * 125* 127* 131*  131*   K 4.7 4.5 3.6 4.5  4.4   CO2 26 24 23 29  27   CL 87* 90* 93* 92*  92*   BUN 9 8 10 11  10   CREATININE 0.7 0.6 0.7 0.6  0.6   ALKPHOS 57  --   --  59   ALT 7*  --   --  10   AST 17  --   --  18   BILITOT 0.2  --   --  0.3       Dietary Orders:  Diet Orders  Report           Diet Adult Regular (IDDSI Level 7): Regular starting at 08/15 1736            Based on the following criteria, this patient qualifies for intravenous to oral conversion:  [x] The patients gastrointestinal tract is functioning (tolerating medications via oral or enteral route for 24 hours and tolerating food or enteral feeds for 24 hours).  [x] The patient is hemodynamically stable for 24 hours (heart rate <100 beats per minute, systolic blood pressure >99 mm Hg, and respiratory rate <20 breaths per minute).  [x] The patient shows clinical improvement (afebrile for at least 24 hours and white blood cell count downtrending or normalized). Additionally, the patient must be non-neutropenic (absolute neutrophil count >500 cells/mm3).  [x] For antimicrobials, the  patient has received IV therapy for at least 24 hours.    IV medication azithromycin will be changed to oral medication     Pharmacist's Name: Sushant Raymond  Pharmacist's Extension: [phone]

## 2022-08-16 NOTE — PLAN OF CARE
[facility] - Telemetry  Initial Discharge Assessment       Primary Care Provider: Primary Doctor No    Admission Diagnosis: Dehydration [E86.0]  Hyponatremia [E87.1]  Nausea & vomiting [R11.2]  Cavitary lesion of lung [J98.4]  Hemoptysis [R04.2]  Chest pain [R07.9]  Pneumonia of left upper lobe due to infectious organism [J18.9]  COVID-19 virus infection [U07.1]    Admission Date: 8/15/2022  Expected Discharge Date: 8/18/2022    Consult: pulm & PT/OT    Payor: MEDICAID / Plan: PENDING MEDICAID / Product Type: Government /     Extended Emergency Contact Information  Primary Emergency Contact: Hellen Isabel  Address: [address]           [place] [address]  Home Phone: [phone]  Mobile Phone: [phone]  Relation: Significant other  Preferred language: English   needed? No    Discharge Plan A: (P) Home with family  Discharge Plan B: (P) Home Health      [id number] [place] - [address] [facility] [address]  [address] [address]  Phone: [phone] Fax: [phone]      Initial Assessment (most recent)       Adult Discharge Assessment - 08/16/22 1115          Discharge Assessment    Assessment Type Discharge Planning Assessment (P)      Confirmed/corrected address, phone number and insurance Yes (P)      Confirmed Demographics Correct on Facesheet (P)      Source of Information patient (P)      Communicated ZANE with patient/caregiver Date not available/Unable to determine (P)      Lives With significant other (P)    Hellen Isabel ([phone])    Do you expect to return to your current living situation? Yes (P)      Do you have help at home or someone to help you manage your care at home? Yes (P)      Prior to hospitilization cognitive status: Alert/Oriented (P)      Current cognitive status: Alert/Oriented (P)      Walking or Climbing Stairs Difficulty none (P)      Dressing/Bathing Difficulty none (P)      Equipment Currently  Used at Home none (P)      Readmission within 30 days? No (P)      Patient currently being followed by outpatient case management? No (P)      Do you currently have service(s) that help you manage your care at home? No (P)      Do you take prescription medications? No (P)      Do you have prescription coverage? No (P)      Do you have any problems affording any of your prescribed medications? -- (P)    na    Is the patient taking medications as prescribed? -- (P)    na    How do you get to doctors appointments? family or friend will provide (P)      Are you on dialysis? No (P)      Do you take coumadin? No (P)      Discharge Plan A Home with family (P)      Discharge Plan B Home Health (P)      DME Needed Upon Discharge  other (see comments) (P)    tbd    Discharge Plan discussed with: Patient (P)                      1115  Patient having a 2D echo done at the bedside when CM rounded. No family present. Patient was admitted with hemoptysis & is being followed by pulm & PT/OT.    Patient lives with his s.o., Hellen Isabel, is independent of all ADLs, & denied the need for assistance with transportation at time of discharge.     Patient stated that he does not have medical insurance but was screened for medicaid this AM. Patient also stated that he does not have a PCP.     CM updated patient's whiteboard with CM name & contact information.     1320  Message sent to teodoro Amaro requesting a [facility] [place] 2[place] follow up appointment with Dr. Lela Quintana. Awaiting response.    1325  CM was informed by  Prem of a hospfu appt scheduled for the patient with Dr. Lela Quintana on 8/25/2022 at 1300. Information added to the patient's discharge paperwork.       Will continue to follow.

## 2022-08-16 NOTE — ASSESSMENT & PLAN NOTE
-patient presents to ED with complaints of hemoptysis x 1 day  -CXR chest revealed Large cavitary opacity in left lower lobe  -CTA chest revealed worsening of left lower lobe and right upper lobe bronchiectasis, bronchial thickening and coalescent cavitation  -T&S completed  -Monitor and trend CBC  -Pulmonary consulted and appreciate recs  -serial AFB q8 x3 to r/o active TB  -inhaled TXA tid  -Airborne/droplet precautions initiated

## 2022-08-16 NOTE — ASSESSMENT & PLAN NOTE
-Na 124 on admit  -With new findings on CT chest, suspect may be related to malignancy?  -Received 1xL NS bolus in ED  -BMP q6 hours with appropriate improvement, can space further  -Neuro checks q4hr

## 2022-08-16 NOTE — PROGRESS NOTES
[facility] Medicine  Progress Note    Patient Name: Hai Gonzales  MRN: [id number]  Patient Class: IP- Inpatient   Admission Date: 8/15/2022  Length of Stay: 1 days  Attending Physician: Gerardo Álvarez, *  Primary Care Provider: Primary Doctor No        Subjective:     Principal Problem:Hemoptysis        HPI:  Hai Gonzales is a 56 y/o male with tobacco abuse and no other significant history presents to [facility] ED with complaints of hemoptysis that started at 0430 today. His associated symptoms are night sweats, chills, intermittent subjective fever, and decreased appetite over the past two months. He states he has intermittent non-bloody diarrheal episodes over the past week. He also states he loss an approximated 20lbs over the past month. He denies history of homelessness, history of incarceration, or history of recent travel out of the country. He admits he is living with his girlfriend who he states he actively sick, however, he does not know the cause of her illness. 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 He reports smoking history of 1.5 packs daily x 40 years (60 PY).     Of note: CT chest without contrast on 5/24/2022 revealed Scattered cavitary lesions throughout the pulmonary parenchyma, most prominently involving the left lower lobe near the fissure. Patient had a pulmonary referral, and an attempt was made to reach out to the patient, however the number provided was the wrong number.     In the ED: BP 94/62   Pulse 89   Temp 99.5 °F (37.5 °C) (Oral)   Resp 20   Wt 68 kg (150 lb)   SpO2 97%   BMI 20.34 kg/m² Labswere remarkable for WBC 15.71, H/H 13.4/39.4, Na 124, Chloride 87. COVID-19 positive.   CTA chest revealed Worsening of left lower lobe and right upper lobe bronchiectasis, bronchial thickening and coalescent cavitation. He received 500ml LR x1, Duo-neb x1, Prednisone 40mg  PO x1, 80mg IV protonix, 1g Rocephin in ED. Pulmonary is consulted. Will admit to hospital medicine for further evaluation and treatment.       Overview/Hospital Course:  No notes on file    Interval History: continued cough productive of bloody sputum. Reports shortness of breath, but not requiring supplemental O2 presently. 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    Review of Systems   Constitutional:  Positive for fatigue and fever.   Respiratory:  Positive for cough (hemoptysis) and shortness of breath.    Cardiovascular:  Negative for chest pain and leg swelling.   Gastrointestinal:  Negative for nausea and vomiting.   Neurological:  Positive for weakness.   Objective:     Vital Signs (Most Recent):  Temp: 97.7 °F (36.5 °C) (08/16/22 0754)  Pulse: 97 (08/16/22 0800)  Resp: 18 (08/16/22 0754)  BP: 110/60 (08/16/22 0754)  SpO2: 98 % (08/16/22 0754) Vital Signs (24h Range):  Temp:  [97.4 °F (36.3 °C)-98.7 °F (37.1 °C)] 97.7 °F (36.5 °C)  Pulse:  [81-97] 97  Resp:  [16-20] 18  SpO2:  [95 %-98 %] 98 %  BP: ()/(51-63) 110/60     Weight: 68 kg (150 lb)  Body mass index is 20.34 kg/m².    Intake/Output Summary (Last 24 hours) at 8/16/2022 1101  Last data filed at 8/15/2022 1342  Gross per 24 hour   Intake 1000 ml   Output --   Net 1000 ml      Physical Exam  Vitals and nursing note reviewed.   Constitutional:       General: He is not in acute distress.     Appearance: He is cachectic. He is ill-appearing.      Comments: Appears fatigue   HENT:      Head: Normocephalic and atraumatic.      Mouth/Throat:      Mouth: Mucous membranes are moist.   Eyes:      Extraocular Movements: Extraocular movements intact.      Pupils: Pupils are equal, round, and reactive to light.   Cardiovascular:      Rate and Rhythm: Normal rate and regular rhythm.      Pulses: Normal pulses.      Heart sounds: Normal heart sounds. No murmur heard.    No  friction rub. No gallop.   Pulmonary:      Effort: Pulmonary effort is normal. No respiratory distress.      Breath sounds: No wheezing or rhonchi.      Comments: Expiratory wheezing appreciated bilaterally; good air movement  Abdominal:      General: Bowel sounds are normal. There is no distension.      Palpations: Abdomen is soft.      Tenderness: There is no abdominal tenderness. There is no guarding or rebound.   Musculoskeletal:         General: No swelling.      Cervical back: Normal range of motion and neck supple.      Right lower leg: No edema.      Left lower leg: No edema.   Skin:     General: Skin is warm and dry.      Capillary Refill: Capillary refill takes less than 2 seconds.      Findings: No bruising, erythema or rash.   Neurological:      General: No focal deficit present.      Mental Status: He is oriented to person, place, and time.      GCS: GCS eye subscore is 4. GCS verbal subscore is 5. GCS motor subscore is 6.   Psychiatric:         Mood and Affect: Mood normal. Affect is flat.         Behavior: Behavior is cooperative.       Significant Labs: All pertinent labs within the past 24 hours have been reviewed.    Significant Imaging: I have reviewed all pertinent imaging results/findings within the past 24 hours.      Assessment/Plan:      * Hemoptysis  -patient presents to ED with complaints of hemoptysis x 1 day  -CXR chest revealed Large cavitary opacity in left lower lobe  -CTA chest revealed worsening of left lower lobe and right upper lobe bronchiectasis, bronchial thickening and coalescent cavitation  -T&S completed  -Monitor and trend CBC  -Pulmonary consulted and appreciate recs  -serial AFB q8 x3 to r/o active TB  -inhaled TXA tid  -Airborne/droplet precautions initiated        Cavitary lesion of lung  - concern for NTB vs malignancy vs TB  - obtain serial AFB  - will need bronch once COVID has resolved  - check HIV  - MTB PCR pending      Hyponatremia  -Na 124 on admit  -With new  findings on CT chest, suspect may be related to malignancy?  -Received 1xL NS bolus in ED  -BMP q6 hours with appropriate improvement, can space further  -Neuro checks q4hr        COVID-19 virus infection  - COVID positive 8/15/22; initiated on protocol  - Isolation: Airborne/Droplet. Surgical mask on patient. Notify Infection Control  - CXR with large cavitary opacity in left lower lobe, no oxygen requirement on exam  - Monitor on Telemetry  -Received 1g Rocephin in ED x1  -Will continue Rocephin and Azithromycin due to concern for superimposed CAP  - stop dexamethasone due to lack of hypoxia  - initiated on remdesivir x3d; daily CMP  - Order RT consult via Respiratory Communication for COVID Protocols  - Incentive Spirometer Q4h  - Continuous Pulse Oximetry, goal SpO2 >90%  - supplemental O2 PRN, will wean as tolerated  - Albuterol INH Q4h scheduled & PRN   - MVI & ascorbic acid 500mg PO BID  -Anti-tussives for cough  - Acetaminophen Q6hr PRN fever/headache  - Loperamide PRN viral diarrhea  - VTE PPx: will hold for now in setting of active hemoptysis  - PT/OT for debility/weakness          Nicotine dependence  -reports nicotine dependence with cigarettes (60 pack year history)  -Dangers of cigarette smoking were reviewed with patient in detail for 10 minutes and patient was encouraged to quit.  -Nicotine replacement options were discussed; refused nicotine patch at this time        Abnormal CT of the chest  -CTA chest revealed worsening of left lower lobe and right upper lobe bronchiectasis, bronchial thickening and coalescent cavitation  -Concern for TB vs non-tuberculous mycobacterial infection vs fungal infection  -Sputum culture pending; serial AFBs  -Pulmonary consulted  -Airborne/Droplet precautions initiated      VTE Risk Mitigation (From admission, onward)         Ordered     Reason for No Pharmacological VTE Prophylaxis  Once        Question:  Reasons:  Answer:  Risk of Bleeding    08/15/22 1518     IP  VTE HIGH RISK PATIENT  Once         08/15/22 1518     Place sequential compression device  Until discontinued         08/15/22 1518                Discharge Planning   ZANE: 8/18/2022     Code Status: Full Code   Is the patient medically ready for discharge?:     Reason for patient still in hospital (select all that apply): Laboratory test and Consult recommendations                     Gerardo Álvarez MD  Department of Hospital Medicine   [facility]

## 2022-08-16 NOTE — ASSESSMENT & PLAN NOTE
-CTA chest revealed worsening of left lower lobe and right upper lobe bronchiectasis, bronchial thickening and coalescent cavitation  -Concern for TB vs non-tuberculous mycobacterial infection vs fungal infection  -Sputum culture pending; serial AFBs  -Pulmonary consulted  -Airborne/Droplet precautions initiated

## 2022-08-16 NOTE — ASSESSMENT & PLAN NOTE
- concern for NTB vs malignancy vs TB  - obtain serial AFB  - will need bronch once COVID has resolved  - check HIV  - MTB PCR pending

## 2022-08-17 LAB
ALBUMIN SERPL BCP-MCNC: 2.3 G/DL (ref 3.5–5.2)
ALP SERPL-CCNC: 60 U/L (ref 55–135)
ALT SERPL W/O P-5'-P-CCNC: 13 U/L (ref 10–44)
ANION GAP SERPL CALC-SCNC: 11 MMOL/L (ref 8–16)
AST SERPL-CCNC: 16 U/L (ref 10–40)
BASOPHILS # BLD AUTO: 0.01 K/UL (ref 0–0.2)
BASOPHILS NFR BLD: 0.1 % (ref 0–1.9)
BILIRUB SERPL-MCNC: 0.2 MG/DL (ref 0.1–1)
BUN SERPL-MCNC: 11 MG/DL (ref 6–20)
CALCIUM SERPL-MCNC: 8.3 MG/DL (ref 8.7–10.5)
CHLORIDE SERPL-SCNC: 97 MMOL/L (ref 95–110)
CO2 SERPL-SCNC: 23 MMOL/L (ref 23–29)
CREAT SERPL-MCNC: 0.5 MG/DL (ref 0.5–1.4)
DIFFERENTIAL METHOD: ABNORMAL
EOSINOPHIL # BLD AUTO: 0 K/UL (ref 0–0.5)
EOSINOPHIL NFR BLD: 0 % (ref 0–8)
ERYTHROCYTE [DISTWIDTH] IN BLOOD BY AUTOMATED COUNT: 13.5 % (ref 11.5–14.5)
EST. GFR  (NO RACE VARIABLE): >60 ML/MIN/1.73 M^2
GLUCOSE SERPL-MCNC: 104 MG/DL (ref 70–110)
HCT VFR BLD AUTO: 36.5 % (ref 40–54)
HGB BLD-MCNC: 12.3 G/DL (ref 14–18)
HIV 1+2 AB+HIV1 P24 AG SERPL QL IA: NEGATIVE
IMM GRANULOCYTES # BLD AUTO: 0.05 K/UL (ref 0–0.04)
IMM GRANULOCYTES NFR BLD AUTO: 0.6 % (ref 0–0.5)
LYMPHOCYTES # BLD AUTO: 0.7 K/UL (ref 1–4.8)
LYMPHOCYTES NFR BLD: 8.2 % (ref 18–48)
MCH RBC QN AUTO: 30.1 PG (ref 27–31)
MCHC RBC AUTO-ENTMCNC: 33.7 G/DL (ref 32–36)
MCV RBC AUTO: 90 FL (ref 82–98)
MONOCYTES # BLD AUTO: 0.7 K/UL (ref 0.3–1)
MONOCYTES NFR BLD: 7.6 % (ref 4–15)
NEUTROPHILS # BLD AUTO: 7.3 K/UL (ref 1.8–7.7)
NEUTROPHILS NFR BLD: 83.5 % (ref 38–73)
NRBC BLD-RTO: 0 /100 WBC
PLATELET # BLD AUTO: 329 K/UL (ref 150–450)
PMV BLD AUTO: 9.3 FL (ref 9.2–12.9)
POCT GLUCOSE: 111 MG/DL (ref 70–110)
POTASSIUM SERPL-SCNC: 4.9 MMOL/L (ref 3.5–5.1)
PROT SERPL-MCNC: 5.8 G/DL (ref 6–8.4)
RBC # BLD AUTO: 4.08 M/UL (ref 4.6–6.2)
SODIUM SERPL-SCNC: 131 MMOL/L (ref 136–145)
WBC # BLD AUTO: 8.69 K/UL (ref 3.9–12.7)

## 2022-08-17 PROCEDURE — 99900035 HC TECH TIME PER 15 MIN (STAT)

## 2022-08-17 PROCEDURE — 36415 COLL VENOUS BLD VENIPUNCTURE: CPT | Performed by: HOSPITALIST

## 2022-08-17 PROCEDURE — 63700000 PHARM REV CODE 250 ALT 637 W/O HCPCS

## 2022-08-17 PROCEDURE — 80053 COMPREHEN METABOLIC PANEL: CPT | Performed by: HOSPITALIST

## 2022-08-17 PROCEDURE — 87015 SPECIMEN INFECT AGNT CONCNTJ: CPT | Performed by: HOSPITALIST

## 2022-08-17 PROCEDURE — 85025 COMPLETE CBC W/AUTO DIFF WBC: CPT | Performed by: HOSPITALIST

## 2022-08-17 PROCEDURE — 94761 N-INVAS EAR/PLS OXIMETRY MLT: CPT

## 2022-08-17 PROCEDURE — 63600175 PHARM REV CODE 636 W HCPCS: Performed by: HOSPITALIST

## 2022-08-17 PROCEDURE — 87149 DNA/RNA DIRECT PROBE: CPT | Performed by: HOSPITALIST

## 2022-08-17 PROCEDURE — 11000001 HC ACUTE MED/SURG PRIVATE ROOM

## 2022-08-17 PROCEDURE — 63600175 PHARM REV CODE 636 W HCPCS: Mod: TB

## 2022-08-17 PROCEDURE — 27000207 HC ISOLATION

## 2022-08-17 PROCEDURE — 25000003 PHARM REV CODE 250

## 2022-08-17 PROCEDURE — 94799 UNLISTED PULMONARY SVC/PX: CPT

## 2022-08-17 PROCEDURE — 87116 MYCOBACTERIA CULTURE: CPT | Performed by: HOSPITALIST

## 2022-08-17 PROCEDURE — 87206 SMEAR FLUORESCENT/ACID STAI: CPT | Performed by: HOSPITALIST

## 2022-08-17 PROCEDURE — 87118 MYCOBACTERIC IDENTIFICATION: CPT | Mod: 59 | Performed by: HOSPITALIST

## 2022-08-17 RX ADMIN — THERA TABS 1 TABLET: TAB at 08:08

## 2022-08-17 RX ADMIN — REMDESIVIR 100 MG: 100 INJECTION, POWDER, LYOPHILIZED, FOR SOLUTION INTRAVENOUS at 08:08

## 2022-08-17 RX ADMIN — CEFTRIAXONE 1 G: 1 INJECTION, SOLUTION INTRAVENOUS at 11:08

## 2022-08-17 RX ADMIN — OXYCODONE HYDROCHLORIDE AND ACETAMINOPHEN 500 MG: 500 TABLET ORAL at 09:08

## 2022-08-17 RX ADMIN — AZITHROMYCIN MONOHYDRATE 500 MG: 250 TABLET ORAL at 08:08

## 2022-08-17 RX ADMIN — OXYCODONE HYDROCHLORIDE AND ACETAMINOPHEN 500 MG: 500 TABLET ORAL at 08:08

## 2022-08-17 NOTE — SUBJECTIVE & OBJECTIVE
Interval History:  Still with cough productive bloody sputum, but no large volume hemoptysis.  AFBs collected overnight, awaiting results.  Breathing appears to be back towards baseline at this time and he is not requiring supplemental oxygen.    Review of Systems   Constitutional:  Positive for fatigue and fever.   Respiratory:  Positive for cough (hemoptysis) and shortness of breath.    Cardiovascular:  Negative for chest pain and leg swelling.   Gastrointestinal:  Negative for nausea and vomiting.   Neurological:  Positive for weakness.   Objective:     Vital Signs (Most Recent):  Temp: 98 °F (36.7 °C) (08/17/22 1150)  Pulse: 81 (08/17/22 1150)  Resp: 17 (08/17/22 1150)  BP: 103/61 (08/17/22 1047)  SpO2: 98 % (08/17/22 1150) Vital Signs (24h Range):  Temp:  [96 °F (35.6 °C)-98.4 °F (36.9 °C)] 98 °F (36.7 °C)  Pulse:  [70-85] 81  Resp:  [16-22] 17  SpO2:  [96 %-99 %] 98 %  BP: ()/(59-79) 103/61     Weight: 68 kg (150 lb)  Body mass index is 20.34 kg/m².  No intake or output data in the 24 hours ending 08/17/22 1243     Physical Exam  Vitals and nursing note reviewed.   Constitutional:       General: He is not in acute distress.     Appearance: He is cachectic. He is ill-appearing.      Comments: Appears fatigue   HENT:      Head: Normocephalic and atraumatic.      Mouth/Throat:      Mouth: Mucous membranes are moist.   Eyes:      Extraocular Movements: Extraocular movements intact.      Pupils: Pupils are equal, round, and reactive to light.   Cardiovascular:      Rate and Rhythm: Normal rate and regular rhythm.      Pulses: Normal pulses.      Heart sounds: Normal heart sounds. No murmur heard.    No friction rub. No gallop.   Pulmonary:      Effort: Pulmonary effort is normal. No respiratory distress.      Breath sounds: No wheezing or rhonchi.      Comments: Expiratory wheezing appreciated bilaterally; good air movement  Abdominal:      General: Bowel sounds are normal. There is no distension.       Palpations: Abdomen is soft.      Tenderness: There is no abdominal tenderness. There is no guarding or rebound.   Musculoskeletal:         General: No swelling.      Cervical back: Normal range of motion and neck supple.      Right lower leg: No edema.      Left lower leg: No edema.   Skin:     General: Skin is warm and dry.      Capillary Refill: Capillary refill takes less than 2 seconds.      Findings: No bruising, erythema or rash.   Neurological:      General: No focal deficit present.      Mental Status: He is oriented to person, place, and time.      GCS: GCS eye subscore is 4. GCS verbal subscore is 5. GCS motor subscore is 6.   Psychiatric:         Mood and Affect: Mood normal. Affect is flat.         Behavior: Behavior is cooperative.       Significant Labs: All pertinent labs within the past 24 hours have been reviewed.    Significant Imaging: I have reviewed all pertinent imaging results/findings within the past 24 hours.

## 2022-08-17 NOTE — PLAN OF CARE
08/17/22 0840   Post-Acute Status   Post-Acute Authorization Other   Other Status [facility]   Hospital Resources/Appts/Education Provided Appointments scheduled and added to AVS   Discharge Plan   Discharge Plan A Home;Home with family      08/16/2022 1108 Fin Assist   Application Submitted Latonya Tolentino Patient      Financial Assistance Note   Called pt on [employer]. Screening complete pt states he is single, not working, was on W/comp but got terminated 07/01/22. May visit was w/comp per pt, so no RETRO needed, no minors, not 65, no VOC. Expansion nara ID # [id number] submitted. Nara states no decision. Added pending coverage.     Case Program Type Program Patients   [id number] Insurance/Other Medicaid Application Hai Gonzales (Self)        Future Appointments   Date Time Provider Department Center   8/25/2022  1:00 PM Lela Quintana MD [facility]     /79 (BP Location: Right arm, Patient Position: Lying)   Pulse 75   Temp 98.3 °F (36.8 °C) (Oral)   Resp 19   Ht 6' (1.829 m)   Wt 68 kg (150 lb)   SpO2 97%   BMI 20.34 kg/m²      ascorbic acid (vitamin C)  500 mg Oral BID    azithromycin  500 mg Oral Daily    cefTRIAXone (ROCEPHIN) IVPB  1 g Intravenous Q24H    multivitamin  1 tablet Oral Daily    remdesivir infusion  100 mg Intravenous Daily

## 2022-08-17 NOTE — PROGRESS NOTES
[facility] Medicine  Progress Note    Patient Name: Hai Gonzales  MRN: [id number]  Patient Class: IP- Inpatient   Admission Date: 8/15/2022  Length of Stay: 2 days  Attending Physician: Gerardo Álvarez, *  Primary Care Provider: Primary Doctor No        Subjective:     Principal Problem:Hemoptysis        HPI:  Hai Gonzales is a 58 y/o male with tobacco abuse and no other significant history presents to [facility] ED with complaints of hemoptysis that started at 0430 today. His associated symptoms are night sweats, chills, intermittent subjective fever, and decreased appetite over the past two months. He states he has intermittent non-bloody diarrheal episodes over the past week. He also states he loss an approximated 20lbs over the past month. He denies history of homelessness, history of incarceration, or history of recent travel out of the country. He admits he is living with his girlfriend who he states he actively sick, however, he does not know the cause of her illness. 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 He reports smoking history of 1.5 packs daily x 40 years (60 PY).     Of note: CT chest without contrast on 5/24/2022 revealed Scattered cavitary lesions throughout the pulmonary parenchyma, most prominently involving the left lower lobe near the fissure. Patient had a pulmonary referral, and an attempt was made to reach out to the patient, however the number provided was the wrong number.     In the ED: BP 94/62   Pulse 89   Temp 99.5 °F (37.5 °C) (Oral)   Resp 20   Wt 68 kg (150 lb)   SpO2 97%   BMI 20.34 kg/m² Labswere remarkable for WBC 15.71, H/H 13.4/39.4, Na 124, Chloride 87. COVID-19 positive.   CTA chest revealed Worsening of left lower lobe and right upper lobe bronchiectasis, bronchial thickening and coalescent cavitation. He received 500ml LR x1, Duo-neb x1, Prednisone 40mg  PO x1, 80mg IV protonix, 1g Rocephin in ED. Pulmonary is consulted. Will admit to hospital medicine for further evaluation and treatment.       Overview/Hospital Course:  No notes on file    Interval History:  Still with cough productive bloody sputum, but no large volume hemoptysis.  AFBs collected overnight, awaiting results.  Breathing appears to be back towards baseline at this time and he is not requiring supplemental oxygen.    Review of Systems   Constitutional:  Positive for fatigue and fever.   Respiratory:  Positive for cough (hemoptysis) and shortness of breath.    Cardiovascular:  Negative for chest pain and leg swelling.   Gastrointestinal:  Negative for nausea and vomiting.   Neurological:  Positive for weakness.   Objective:     Vital Signs (Most Recent):  Temp: 98 °F (36.7 °C) (08/17/22 1150)  Pulse: 81 (08/17/22 1150)  Resp: 17 (08/17/22 1150)  BP: 103/61 (08/17/22 1047)  SpO2: 98 % (08/17/22 1150) Vital Signs (24h Range):  Temp:  [96 °F (35.6 °C)-98.4 °F (36.9 °C)] 98 °F (36.7 °C)  Pulse:  [70-85] 81  Resp:  [16-22] 17  SpO2:  [96 %-99 %] 98 %  BP: ()/(59-79) 103/61     Weight: 68 kg (150 lb)  Body mass index is 20.34 kg/m².  No intake or output data in the 24 hours ending 08/17/22 1243     Physical Exam  Vitals and nursing note reviewed.   Constitutional:       General: He is not in acute distress.     Appearance: He is cachectic. He is ill-appearing.      Comments: Appears fatigue   HENT:      Head: Normocephalic and atraumatic.      Mouth/Throat:      Mouth: Mucous membranes are moist.   Eyes:      Extraocular Movements: Extraocular movements intact.      Pupils: Pupils are equal, round, and reactive to light.   Cardiovascular:      Rate and Rhythm: Normal rate and regular rhythm.      Pulses: Normal pulses.      Heart sounds: Normal heart sounds. No murmur heard.    No friction rub. No gallop.   Pulmonary:      Effort: Pulmonary effort is normal. No respiratory distress.      Breath  sounds: No wheezing or rhonchi.      Comments: Expiratory wheezing appreciated bilaterally; good air movement  Abdominal:      General: Bowel sounds are normal. There is no distension.      Palpations: Abdomen is soft.      Tenderness: There is no abdominal tenderness. There is no guarding or rebound.   Musculoskeletal:         General: No swelling.      Cervical back: Normal range of motion and neck supple.      Right lower leg: No edema.      Left lower leg: No edema.   Skin:     General: Skin is warm and dry.      Capillary Refill: Capillary refill takes less than 2 seconds.      Findings: No bruising, erythema or rash.   Neurological:      General: No focal deficit present.      Mental Status: He is oriented to person, place, and time.      GCS: GCS eye subscore is 4. GCS verbal subscore is 5. GCS motor subscore is 6.   Psychiatric:         Mood and Affect: Mood normal. Affect is flat.         Behavior: Behavior is cooperative.       Significant Labs: All pertinent labs within the past 24 hours have been reviewed.    Significant Imaging: I have reviewed all pertinent imaging results/findings within the past 24 hours.      Assessment/Plan:      * Hemoptysis  -patient presents to ED with complaints of hemoptysis x 1 day  -CXR chest revealed Large cavitary opacity in left lower lobe  -CTA chest revealed worsening of left lower lobe and right upper lobe bronchiectasis, bronchial thickening and coalescent cavitation  -T&S completed  -Monitor and trend CBC  -Pulmonary consulted and appreciate recs  -serial AFB q8 x3 to r/o active TB, pending  -inhaled TXA tid discontinued  -Airborne/droplet precautions initiated    Cavitary lesion of lung  - concern for NTB vs malignancy vs TB  - obtain serial AFB  - will need bronch once COVID has resolved  - check HIV  - MTB PCR pending      Hyponatremia  -Na 124 on admit  -With new findings on CT chest, suspect may be related to malignancy?  -Received 1xL NS bolus in ED  -BMP q6  hours with appropriate improvement, can space further  -Neuro checks q4hr        COVID-19 virus infection  - COVID positive 8/15/22; initiated on protocol  - Isolation: Airborne/Droplet. Surgical mask on patient. Notify Infection Control  - CXR with large cavitary opacity in left lower lobe, no oxygen requirement on exam  - Monitor on Telemetry  -Received 1g Rocephin in ED x1  -Will continue Rocephin and Azithromycin due to concern for superimposed CAP  - stop dexamethasone due to lack of hypoxia  - initiated on remdesivir x3d; daily CMP  - Order RT consult via Respiratory Communication for COVID Protocols  - Incentive Spirometer Q4h  - Continuous Pulse Oximetry, goal SpO2 >90%  - supplemental O2 PRN, will wean as tolerated  - Albuterol INH Q4h scheduled & PRN   - MVI & ascorbic acid 500mg PO BID  -Anti-tussives for cough  - Acetaminophen Q6hr PRN fever/headache  - Loperamide PRN viral diarrhea  - VTE PPx: will hold for now in setting of active hemoptysis  - PT/OT for debility/weakness    Nicotine dependence  -reports nicotine dependence with cigarettes (60 pack year history)  -Dangers of cigarette smoking were reviewed with patient in detail for 10 minutes and patient was encouraged to quit.  -Nicotine replacement options were discussed; refused nicotine patch at this time        Abnormal CT of the chest  -CTA chest revealed worsening of left lower lobe and right upper lobe bronchiectasis, bronchial thickening and coalescent cavitation  -Concern for TB vs non-tuberculous mycobacterial infection vs fungal infection  -Sputum culture pending; serial AFBs  -Pulmonary consulted  -Airborne/Droplet precautions initiated        VTE Risk Mitigation (From admission, onward)         Ordered     Reason for No Pharmacological VTE Prophylaxis  Once        Question:  Reasons:  Answer:  Risk of Bleeding    08/15/22 1518     IP VTE HIGH RISK PATIENT  Once         08/15/22 1518     Place sequential compression device  Until  discontinued         08/15/22 1518                Discharge Planning   ZANE: 8/18/2022     Code Status: Full Code   Is the patient medically ready for discharge?:     Reason for patient still in hospital (select all that apply): Laboratory test  Discharge Plan A: Home, Home with family                  Gerardo Álvarez MD  Department of [facility] Medicine   [facility]

## 2022-08-17 NOTE — NURSING
Care plans reviewed. No c/o of SOB or chest pains. No acute distress noted. All medications given as ordered. Vitals WNl. Safety maintained, bed in lowest position, bed alarm on, bed wheels locked, call light with in reach. Will continue to monitor.

## 2022-08-17 NOTE — ASSESSMENT & PLAN NOTE
-patient presents to ED with complaints of hemoptysis x 1 day  -CXR chest revealed Large cavitary opacity in left lower lobe  -CTA chest revealed worsening of left lower lobe and right upper lobe bronchiectasis, bronchial thickening and coalescent cavitation  -T&S completed  -Monitor and trend CBC  -Pulmonary consulted and appreciate recs  -serial AFB q8 x3 to r/o active TB, pending  -inhaled TXA tid discontinued  -Airborne/droplet precautions initiated

## 2022-08-18 LAB
BACTERIA SPEC AEROBE CULT: NORMAL
GRAM STN SPEC: NORMAL
POCT GLUCOSE: 95 MG/DL (ref 70–110)

## 2022-08-18 PROCEDURE — 94761 N-INVAS EAR/PLS OXIMETRY MLT: CPT

## 2022-08-18 PROCEDURE — 11000001 HC ACUTE MED/SURG PRIVATE ROOM

## 2022-08-18 PROCEDURE — 94799 UNLISTED PULMONARY SVC/PX: CPT

## 2022-08-18 PROCEDURE — 63600175 PHARM REV CODE 636 W HCPCS: Performed by: HOSPITALIST

## 2022-08-18 PROCEDURE — 25000003 PHARM REV CODE 250

## 2022-08-18 PROCEDURE — 27000207 HC ISOLATION

## 2022-08-18 PROCEDURE — 99900035 HC TECH TIME PER 15 MIN (STAT)

## 2022-08-18 RX ADMIN — OXYCODONE HYDROCHLORIDE AND ACETAMINOPHEN 500 MG: 500 TABLET ORAL at 09:08

## 2022-08-18 RX ADMIN — CEFTRIAXONE 1 G: 1 INJECTION, SOLUTION INTRAVENOUS at 01:08

## 2022-08-18 RX ADMIN — THERA TABS 1 TABLET: TAB at 09:08

## 2022-08-18 NOTE — PROGRESS NOTES
[facility] Medicine  Progress Note    Patient Name: Hai Gonzales  MRN: [id number]  Patient Class: IP- Inpatient   Admission Date: 8/15/2022  Length of Stay: 3 days  Attending Physician: Gerardo Álvarez, *  Primary Care Provider: Primary Doctor No        Subjective:     Principal Problem:Hemoptysis        HPI:  Hai Gonzales is a 56 y/o male with tobacco abuse and no other significant history presents to [facility] ED with complaints of hemoptysis that started at 0430 today. His associated symptoms are night sweats, chills, intermittent subjective fever, and decreased appetite over the past two months. He states he has intermittent non-bloody diarrheal episodes over the past week. He also states he loss an approximated 20lbs over the past month. He denies history of homelessness, history of incarceration, or history of recent travel out of the country. He admits he is living with his girlfriend who he states he actively sick, however, he does not know the cause of her illness. 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 He reports smoking history of 1.5 packs daily x 40 years (60 PY).     Of note: CT chest without contrast on 5/24/2022 revealed Scattered cavitary lesions throughout the pulmonary parenchyma, most prominently involving the left lower lobe near the fissure. Patient had a pulmonary referral, and an attempt was made to reach out to the patient, however the number provided was the wrong number.     In the ED: BP 94/62   Pulse 89   Temp 99.5 °F (37.5 °C) (Oral)   Resp 20   Wt 68 kg (150 lb)   SpO2 97%   BMI 20.34 kg/m² Labswere remarkable for WBC 15.71, H/H 13.4/39.4, Na 124, Chloride 87. COVID-19 positive.   CTA chest revealed Worsening of left lower lobe and right upper lobe bronchiectasis, bronchial thickening and coalescent cavitation. He received 500ml LR x1, Duo-neb x1, Prednisone 40mg  PO x1, 80mg IV protonix, 1g Rocephin in ED. Pulmonary is consulted. Will admit to hospital medicine for further evaluation and treatment.       Overview/Hospital Course:  No notes on file    Interval History:  Cough improving a little. Does not feel short of breath today. AFB +. Discussed with Pulm; ID consulted.    Review of Systems   Constitutional:  Positive for fatigue and fever.   Respiratory:  Positive for cough (hemoptysis) and shortness of breath.    Cardiovascular:  Negative for chest pain and leg swelling.   Gastrointestinal:  Negative for nausea and vomiting.   Neurological:  Positive for weakness.   Objective:     Vital Signs (Most Recent):  Temp: 99 °F (37.2 °C) (08/18/22 1042)  Pulse: 106 (08/18/22 1042)  Resp: 18 (08/18/22 1042)  BP: (!) 104/58 (08/18/22 1042)  SpO2: 96 % (08/18/22 1042) Vital Signs (24h Range):  Temp:  [97.7 °F (36.5 °C)-99.4 °F (37.4 °C)] 99 °F (37.2 °C)  Pulse:  [] 106  Resp:  [17-18] 18  SpO2:  [94 %-99 %] 96 %  BP: (104-134)/(58-72) 104/58     Weight: 68 kg (150 lb)  Body mass index is 20.34 kg/m².  No intake or output data in the 24 hours ending 08/18/22 1102     Physical Exam  Vitals and nursing note reviewed.   Constitutional:       General: He is not in acute distress.     Appearance: He is cachectic. He is ill-appearing.      Comments: Appears fatigue   HENT:      Head: Normocephalic and atraumatic.      Mouth/Throat:      Mouth: Mucous membranes are moist.   Eyes:      Extraocular Movements: Extraocular movements intact.      Pupils: Pupils are equal, round, and reactive to light.   Cardiovascular:      Rate and Rhythm: Normal rate and regular rhythm.      Pulses: Normal pulses.      Heart sounds: Normal heart sounds. No murmur heard.    No friction rub. No gallop.   Pulmonary:      Effort: Pulmonary effort is normal. No respiratory distress.      Breath sounds: No wheezing or rhonchi.      Comments: Expiratory wheezing appreciated bilaterally; good air  movement  Abdominal:      General: Bowel sounds are normal. There is no distension.      Palpations: Abdomen is soft.      Tenderness: There is no abdominal tenderness. There is no guarding or rebound.   Musculoskeletal:         General: No swelling.      Cervical back: Normal range of motion and neck supple.      Right lower leg: No edema.      Left lower leg: No edema.   Skin:     General: Skin is warm and dry.      Capillary Refill: Capillary refill takes less than 2 seconds.      Findings: No bruising, erythema or rash.   Neurological:      General: No focal deficit present.      Mental Status: He is oriented to person, place, and time.      GCS: GCS eye subscore is 4. GCS verbal subscore is 5. GCS motor subscore is 6.   Psychiatric:         Mood and Affect: Mood normal. Affect is flat.         Behavior: Behavior is cooperative.       Significant Labs: All pertinent labs within the past 24 hours have been reviewed.    Significant Imaging: I have reviewed all pertinent imaging results/findings within the past 24 hours.      Assessment/Plan:      * Hemoptysis  -patient presents to ED with complaints of hemoptysis x 1 day  -CXR chest revealed Large cavitary opacity in left lower lobe  -CTA chest revealed worsening of left lower lobe and right upper lobe bronchiectasis, bronchial thickening and coalescent cavitation  -T&S completed  -Monitor and trend CBC  -Pulmonary consulted and appreciate recs  -serial AFB q8 x3 positive; MTB PCR pending  -inhaled TXA tid discontinued  -Airborne/droplet precautions initiated    Cavitary lesion of lung  - concern for NTB vs malignancy vs TB  - serial AFB +  - HIV negative  - MTB PCR pending  - ID consult      Hyponatremia  -Na 124 on admit  -With new findings on CT chest, suspect may be related to malignancy?  -Received 1xL NS bolus in ED  -BMP q6 hours with appropriate improvement, can space further  -Neuro checks q4hr        COVID-19 virus infection  - COVID positive 8/15/22;  initiated on protocol  - Isolation: Airborne/Droplet. Surgical mask on patient. Notify Infection Control  - CXR with large cavitary opacity in left lower lobe, no oxygen requirement on exam  - Monitor on Telemetry  -Received 1g Rocephin in ED x1  -Will continue Rocephin and Azithromycin due to concern for superimposed CAP  - stop dexamethasone due to lack of hypoxia  - initiated on remdesivir x3d; daily CMP  - Order RT consult via Respiratory Communication for COVID Protocols  - Incentive Spirometer Q4h  - Continuous Pulse Oximetry, goal SpO2 >90%  - supplemental O2 PRN, will wean as tolerated  - Albuterol INH Q4h scheduled & PRN   - MVI & ascorbic acid 500mg PO BID  -Anti-tussives for cough  - Acetaminophen Q6hr PRN fever/headache  - Loperamide PRN viral diarrhea  - VTE PPx: will hold for now in setting of active hemoptysis  - PT/OT for debility/weakness    Nicotine dependence  -reports nicotine dependence with cigarettes (60 pack year history)  -Dangers of cigarette smoking were reviewed with patient in detail for 10 minutes and patient was encouraged to quit.  -Nicotine replacement options were discussed; refused nicotine patch at this time        Abnormal CT of the chest  -CTA chest revealed worsening of left lower lobe and right upper lobe bronchiectasis, bronchial thickening and coalescent cavitation  -Concern for TB vs non-tuberculous mycobacterial infection vs fungal infection  -Sputum culture ngtd; serial AFBs +   -Pulmonary consulted  -Airborne/Droplet precautions initiated        VTE Risk Mitigation (From admission, onward)         Ordered     Reason for No Pharmacological VTE Prophylaxis  Once        Question:  Reasons:  Answer:  Risk of Bleeding    08/15/22 1518     IP VTE HIGH RISK PATIENT  Once         08/15/22 1518     Place sequential compression device  Until discontinued         08/15/22 1518                Discharge Planning   ZANE: 8/19/2022     Code Status: Full Code   Is the patient medically  ready for discharge?:     Reason for patient still in hospital (select all that apply): Laboratory test and Consult recommendations  Discharge Plan A: Home, Home with family                  Gerardo Álvarez MD  Department of [facility] Medicine   [facility]

## 2022-08-18 NOTE — ASSESSMENT & PLAN NOTE
-patient presents to ED with complaints of hemoptysis x 1 day  -CXR chest revealed Large cavitary opacity in left lower lobe  -CTA chest revealed worsening of left lower lobe and right upper lobe bronchiectasis, bronchial thickening and coalescent cavitation  -T&S completed  -Monitor and trend CBC  -Pulmonary consulted and appreciate recs  -serial AFB q8 x3 positive; MTB PCR pending  -inhaled TXA tid discontinued  -Airborne/droplet precautions initiated

## 2022-08-18 NOTE — CONSULTS
[facility] Infectious Disease Consult Note    Consulting Physician: Dr. Gerardo Álvarez    Reason for Consult: +AFB      Chief Complaint: Hemoptysis x 1 day    History of Present Illness:    Hai Gonzales is a 58 y/o male with tobacco abuse and no other significant history presents to [facility] ED with complaints of hemoptysis that started at 0430 day of admission. Endorses night sweats, chills, intermittent subjective fever, and decreased appetite over the past two months. + intermittent non-bloody diarrheal episodes over the past week.~20lbs weight loss over the past month.     He denies history of homelessness, history of incarceration, or history of recent travel out of the country. He admits he is living with his girlfriend who he states he actively sick, however, he does not know the cause of her illness. 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 He reports smoking history of 1.5 packs daily x 40 years (60 PY).     CTA with worsening LLL and RUL bronchiectasis, bronchial thickening, and coalescent cavitation. Patient admitted to Hospital Medicine for further eval and treatment. Also found to be COVID+. Pulm consulted due to hemoptysis, concern for MTB vs TB vs malignancy; plan for bronch once COVID negative. Serial AFBs started, so far 2/3 returned positive, 3rd pending. ID consulted for +AFBs.     Active Hospital Problems    Diagnosis  POA    *Hemoptysis [R04.2]  Yes    Cavitary lesion of lung [J98.4]  Yes    Abnormal CT of the chest [R93.89]  Yes    Nicotine dependence [F17.200]  Yes    COVID-19 virus infection [U07.1]  Yes    Hyponatremia [E87.1]  Yes      Resolved Hospital Problems    Diagnosis Date Resolved POA    Leukocytosis [D72.829] 08/16/2022 Yes       Review of Symptoms:  As per HPI. All other systems reviewed and are negative.      Past Medical History:  11/18- CXR with R apex 2.5cm nodular density  concerning for scarring vs lung mass with biapical pleural thickening and apical bullae; recommended follow up CT but never happened    Past Surgical History:  No past surgical history on file.    Family History:  No family history on file.      Social History:  Social History     Socioeconomic History    Marital status: Significant Other       Allergies:  Review of patient's allergies indicates:  No Known Allergies     Pertinent Medications:  Antibiotics:   Antibiotics (From admission, onward)            Start     Stop Route Frequency Ordered    08/16/22 1215  cefTRIAXone (ROCEPHIN) 1 g/50 mL D5W IVPB         08/20 1214 IV Every 24 hours (non-standard times) 08/16/22 1106          Physical Exam:  VS (24h):   Vitals:    08/18/22 1042   BP: (!) 104/58   Pulse: 106   Resp: 18   Temp: 99 °F (37.2 °C)     Temp:  [97.7 °F (36.5 °C)-99.4 °F (37.4 °C)]     Constitutional:       General: He is not in acute distress.     Appearance: He is cachectic. Comfortable on room air.     Comments: Appears fatigued  HENT:      Head: Normocephalic and atraumatic.      Mouth/Throat:      Mouth: Mucous membranes are moist.   Eyes:      Extraocular Movements: Extraocular movements intact.      Pupils: Pupils are equal, round, and reactive to light.   Cardiovascular:      Rate and Rhythm: Normal rate and regular rhythm.      Pulses: Normal pulses.      Heart sounds: Normal heart sounds. No murmur heard.    No friction rub. No gallop.   Pulmonary:      Effort: Pulmonary effort is normal. No respiratory distress.      Breath sounds: Expiratory wheezing bilaterally  Abdominal:      General: Bowel sounds are normal. There is no distension.      Palpations: Abdomen is soft.      Tenderness: There is no abdominal tenderness. There is no guarding or rebound.   Musculoskeletal:         General: No swelling.      Cervical back: Normal range of motion and neck supple.      Right lower leg: No edema.      Left lower leg: No edema.   Skin:      General: Skin is warm and dry.      Capillary Refill: Capillary refill takes less than 2 seconds.      Findings: No bruising, erythema or rash.   Neurological:      General: No focal deficit present.      Mental Status: He is oriented to person, place, and time.   Psychiatric:         Mood and Affect: Mood normal.      Behavior: Behavior is cooperative.      Labs:  CBC:   Lab Results   Component Value Date    WBC 8.69 08/17/2022    WBC 9.31 08/16/2022    WBC 15.71 (H) 08/15/2022    HGB 12.3 (L) 08/17/2022    HCT 36.5 (L) 08/17/2022    MCV 90 08/17/2022     08/17/2022       BMP: No results for input(s): GLU, NA, K, CL, CO2, BUN, CREATININE, CALCIUM, MG in the last 24 hours.    LFT:   Lab Results   Component Value Date    ALT 13 08/17/2022    AST 16 08/17/2022    ALKPHOS 60 08/17/2022    BILITOT 0.2 08/17/2022         Microbiology x 7d:   Microbiology Results (last 7 days)     Procedure Component Value Units Date/Time    Culture, Respiratory with Gram Stain [[id number]] Collected: 08/15/22 2139    Order Status: Completed Specimen: Sputum, Expectorated Updated: 08/18/22 0902     Respiratory Culture Normal respiratory paige      No S aureus or Pseudomonas isolated.     Gram Stain (Respiratory) <10 epithelial cells per low power field.     Gram Stain (Respiratory) Rare WBC's     Gram Stain (Respiratory) Rare Gram positive cocci     Gram Stain (Respiratory) Rare Gram negative rods    Culture, Respiratory with Gram Stain [[id number]] Collected: 08/15/22 2139    Order Status: Completed Specimen: Respiratory from Sputum, Expectorated Updated: 08/18/22 0901     Respiratory Culture Normal respiratory paige      No S aureus or Pseudomonas isolated.     Gram Stain (Respiratory) <10 epithelial cells per low power field.     Gram Stain (Respiratory) Moderate WBC's      Gram Stain (Respiratory) Rare Gram positive cocci    Culture, Respiratory with Gram Stain [[id number]] Collected: 08/15/22 2138    Order Status: Completed  Specimen: Respiratory from Sputum, Expectorated Updated: 08/18/22 0858     Respiratory Culture Normal respiratory paige      No S aureus or Pseudomonas isolated.     Gram Stain (Respiratory) <10 epithelial cells per low power field.     Gram Stain (Respiratory) Rare WBC's     Gram Stain (Respiratory) Rare yeast     Gram Stain (Respiratory) Rare Gram positive cocci    AFB Culture & Smear [[id number]]  (Abnormal) Collected: 08/16/22 1030    Order Status: Completed Specimen: Respiratory from Sputum, Expectorated Updated: 08/17/22 2127     AFB Culture & Smear Culture in progress     AFB CULTURE STAIN Positive for acid fast bacilli, 8 orgs/field     AFB CULTURE STAIN Results called to and read back by: Francisca Abreu LPN 08/17/2022  15:49    AFB Culture & Smear [[id number]]  (Abnormal) Collected: 08/16/22 1520    Order Status: Completed Specimen: Respiratory from Sputum, Expectorated Updated: 08/17/22 2127     AFB Culture & Smear Culture in progress     AFB CULTURE STAIN Positive for acid fast bacilli, 3 orgs/field     AFB CULTURE STAIN Results called to and read back by: Francisca Abreu LPN 08/17/2022  15:48    AFB Culture & Smear [[id number]] Collected: 08/17/22 0405    Order Status: Sent Specimen: Respiratory from Sputum Updated: 08/17/22 1503    AFB Culture & Smear [[id number]]     Order Status: Canceled Specimen: Respiratory     AFB Culture & Smear [[id number]]     Order Status: Canceled Specimen: Respiratory     AFB Culture & Smear [[id number]]     Order Status: Canceled Specimen: Respiratory     AFB Culture & Smear [[id number]]     Order Status: Canceled Specimen: Body Fluid             Imaging:  CTA Chest Non-Coronary (PE Study)   Final Result      No pulmonary artery thromboembolism.      Worsening of left lower lobe and right upper lobe bronchiectasis, bronchial thickening and coalescent cavitation.  Tuberculosis should be strongly considered as well as non tuberculous mycobacterial infectiond and fungal infection.   Superimposed infection or hemorrhage into the large left lower lobe cavity is not excluded.         Electronically signed by: Terrence Blair Ronald    Date:    08/15/2022   Time:    14:53      X-Ray Chest PA And Lateral   Final Result   Abnormal      Large cavitary opacity in left lower lobe.  Tuberculosis is a consideration.      Background of obstructive lung disease      This report was flagged in Epic as abnormal.         Electronically signed by: Terrence St. Cardenas    Date:    08/15/2022   Time:    11:37              Assessment and Plan:  Hai Gonzales is a 56 y/o male with hx tobacco abuse that presented for hemoptysis, found to be COVID+ and with CT concerning for NTB vs TB vs malignancy with +AFB x2, third pending.    +AFBs x2  -M.TB PCR (NAAT) pending  -HIV negative  -Pending 3rd AFB  -Continue isolation protocols  -Follow AFB cultures (TB may take 1-2 weeks to grow depending on growth media)  -will likely need to be established in Dr. Jolley's clinic; pulm following    ?CAP  -s/p 3d azithro, day 4 rocephin  -would stop in setting of possible TB as can affect growth in cultures    COVID-19+  -S/p 3d remdesivir  -On room air, no complications at this time    Thank you for this consult. Patient staffed with Dr. Martinez, attestation to follow. Please contact with any questions.    Siomara Guerrero MD  [facility] Internal Medicine, PGY-3  [facility] Infectious Diseases Service  08/18/2022 11:14 AM

## 2022-08-18 NOTE — ASSESSMENT & PLAN NOTE
- concern for NTB vs malignancy vs TB  - serial AFB +  - HIV negative  - MTB PCR pending  - ID consult

## 2022-08-18 NOTE — ASSESSMENT & PLAN NOTE
-CTA chest revealed worsening of left lower lobe and right upper lobe bronchiectasis, bronchial thickening and coalescent cavitation  -Concern for TB vs non-tuberculous mycobacterial infection vs fungal infection  -Sputum culture ngtd; serial AFBs +   -Pulmonary consulted  -Airborne/Droplet precautions initiated

## 2022-08-18 NOTE — PLAN OF CARE
Pulmonology Plan of Care    Chart reviewed. Patient remains stable on room air, still with hemoptysis. AFB x2 returned positive, NAAT pending. Discussed with primary team, ID consulted. Awaiting NAAT to determine treatment course. Still not able for go for Bronch due to isolation status.    Please reach out with any questions or concerns regarding the care of Mr. Hai Gonzales.    Wesley Suarez M.D.  [facility] Pulmonary & Critical Care Fellow

## 2022-08-18 NOTE — PLAN OF CARE
Problem: Adult Inpatient Plan of Care  Goal: Plan of Care Review  Outcome: Ongoing, Progressing  Goal: Patient-Specific Goal (Individualized)  Outcome: Ongoing, Progressing  Goal: Absence of Hospital-Acquired Illness or Injury  Outcome: Ongoing, Progressing  Goal: Optimal Comfort and Wellbeing  Outcome: Ongoing, Progressing  Goal: Readiness for Transition of Care  Outcome: Ongoing, Progressing     Problem: Gas Exchange Impaired  Goal: Optimal Gas Exchange  Outcome: Ongoing, Progressing     Problem: Infection  Goal: Absence of Infection Signs and Symptoms  Outcome: Ongoing, Progressing     Problem: Fall Injury Risk  Goal: Absence of Fall and Fall-Related Injury  Outcome: Ongoing, Progressing

## 2022-08-18 NOTE — SUBJECTIVE & OBJECTIVE
Interval History:  Cough improving a little. Does not feel short of breath today. AFB +. Discussed with Pulm; ID consulted.    Review of Systems   Constitutional:  Positive for fatigue and fever.   Respiratory:  Positive for cough (hemoptysis) and shortness of breath.    Cardiovascular:  Negative for chest pain and leg swelling.   Gastrointestinal:  Negative for nausea and vomiting.   Neurological:  Positive for weakness.   Objective:     Vital Signs (Most Recent):  Temp: 99 °F (37.2 °C) (08/18/22 1042)  Pulse: 106 (08/18/22 1042)  Resp: 18 (08/18/22 1042)  BP: (!) 104/58 (08/18/22 1042)  SpO2: 96 % (08/18/22 1042) Vital Signs (24h Range):  Temp:  [97.7 °F (36.5 °C)-99.4 °F (37.4 °C)] 99 °F (37.2 °C)  Pulse:  [] 106  Resp:  [17-18] 18  SpO2:  [94 %-99 %] 96 %  BP: (104-134)/(58-72) 104/58     Weight: 68 kg (150 lb)  Body mass index is 20.34 kg/m².  No intake or output data in the 24 hours ending 08/18/22 1102     Physical Exam  Vitals and nursing note reviewed.   Constitutional:       General: He is not in acute distress.     Appearance: He is cachectic. He is ill-appearing.      Comments: Appears fatigue   HENT:      Head: Normocephalic and atraumatic.      Mouth/Throat:      Mouth: Mucous membranes are moist.   Eyes:      Extraocular Movements: Extraocular movements intact.      Pupils: Pupils are equal, round, and reactive to light.   Cardiovascular:      Rate and Rhythm: Normal rate and regular rhythm.      Pulses: Normal pulses.      Heart sounds: Normal heart sounds. No murmur heard.    No friction rub. No gallop.   Pulmonary:      Effort: Pulmonary effort is normal. No respiratory distress.      Breath sounds: No wheezing or rhonchi.      Comments: Expiratory wheezing appreciated bilaterally; good air movement  Abdominal:      General: Bowel sounds are normal. There is no distension.      Palpations: Abdomen is soft.      Tenderness: There is no abdominal tenderness. There is no guarding or rebound.    Musculoskeletal:         General: No swelling.      Cervical back: Normal range of motion and neck supple.      Right lower leg: No edema.      Left lower leg: No edema.   Skin:     General: Skin is warm and dry.      Capillary Refill: Capillary refill takes less than 2 seconds.      Findings: No bruising, erythema or rash.   Neurological:      General: No focal deficit present.      Mental Status: He is oriented to person, place, and time.      GCS: GCS eye subscore is 4. GCS verbal subscore is 5. GCS motor subscore is 6.   Psychiatric:         Mood and Affect: Mood normal. Affect is flat.         Behavior: Behavior is cooperative.       Significant Labs: All pertinent labs within the past 24 hours have been reviewed.    Significant Imaging: I have reviewed all pertinent imaging results/findings within the past 24 hours.

## 2022-08-18 NOTE — PLAN OF CARE
0844  CM received a call from the patient ([phone]) questioning his discharge status. CM explained that this CM will meet with Dr. Álvarez this AM & will update the pt regarding above. Patient verbalized understanding.     1235  CM received a call from the patient's s.o., Hellen Isabel ([phone]), with multiple questions & requesting a call from the MD. Message sent to Dr. Álvarez informing of above.     1335  CM informed the patient via phone that he will not be discharged today. Patient verbalized understanding.       Will continue to follow.

## 2022-08-19 LAB
M TB CMPLX DNA SPEC QL NAA+PROBE: NEGATIVE
POCT GLUCOSE: 92 MG/DL (ref 70–110)
SPECIMEN SOURCE: NORMAL

## 2022-08-19 PROCEDURE — 94799 UNLISTED PULMONARY SVC/PX: CPT

## 2022-08-19 PROCEDURE — 94761 N-INVAS EAR/PLS OXIMETRY MLT: CPT

## 2022-08-19 PROCEDURE — 25000003 PHARM REV CODE 250

## 2022-08-19 PROCEDURE — 99900035 HC TECH TIME PER 15 MIN (STAT)

## 2022-08-19 PROCEDURE — 94640 AIRWAY INHALATION TREATMENT: CPT

## 2022-08-19 PROCEDURE — 25000242 PHARM REV CODE 250 ALT 637 W/ HCPCS

## 2022-08-19 PROCEDURE — 27100098 HC SPACER

## 2022-08-19 PROCEDURE — 11000001 HC ACUTE MED/SURG PRIVATE ROOM

## 2022-08-19 PROCEDURE — 27000207 HC ISOLATION

## 2022-08-19 RX ADMIN — OXYCODONE HYDROCHLORIDE AND ACETAMINOPHEN 500 MG: 500 TABLET ORAL at 08:08

## 2022-08-19 RX ADMIN — ALBUTEROL SULFATE 2 PUFF: 90 AEROSOL, METERED RESPIRATORY (INHALATION) at 10:08

## 2022-08-19 RX ADMIN — THERA TABS 1 TABLET: TAB at 09:08

## 2022-08-19 RX ADMIN — OXYCODONE HYDROCHLORIDE AND ACETAMINOPHEN 500 MG: 500 TABLET ORAL at 09:08

## 2022-08-19 NOTE — ASSESSMENT & PLAN NOTE
- COVID positive 8/15/22; initiated on protocol  - Isolation: Airborne/Droplet. Surgical mask on patient. Notify Infection Control  - CXR with large cavitary opacity in left lower lobe, no oxygen requirement on exam  - Monitor on Telemetry  -Received 1g Rocephin in ED x1  -completed course of Rocephin due to concern for superimposed CAP  - stop dexamethasone due to lack of hypoxia  - initiated on remdesivir x3d; daily CMP  - Order RT consult via Respiratory Communication for COVID Protocols  - Incentive Spirometer Q4h  - Continuous Pulse Oximetry, goal SpO2 >90%  - supplemental O2 PRN, will wean as tolerated  - Albuterol INH Q4h scheduled & PRN   - MVI & ascorbic acid 500mg PO BID  -Anti-tussives for cough  - Acetaminophen Q6hr PRN fever/headache  - Loperamide PRN viral diarrhea  - VTE PPx: will hold for now in setting of active hemoptysis  - PT/OT for debility/weakness   Patient Name: Kaitlin Guerrero  : 1941    MRN: [id number]                              Today's Date: 10/22/2019       Admit Date: 10/18/Oma Farias, PT Physical Therapist PT     09/17/19 -  Sophia Padilla, Sophia Padilla, ANTOINETTE  10/22/2019

## 2022-08-19 NOTE — SUBJECTIVE & OBJECTIVE
Interval History:  Still with hemoptysis, although does not feel short of breath.  Feels like he is essentially back to baseline.  No fevers today.  Awaiting MTB PCR to further guide treatment.  Discussed positive acid-fast bacilli with the patient.    Review of Systems   Constitutional:  Positive for fatigue. Negative for fever.   Respiratory:  Positive for cough (hemoptysis) and shortness of breath.    Cardiovascular:  Negative for chest pain and leg swelling.   Gastrointestinal:  Negative for nausea and vomiting.   Neurological:  Positive for weakness.   Objective:     Vital Signs (Most Recent):  Temp: 97.6 °F (36.4 °C) (08/19/22 0737)  Pulse: 88 (08/19/22 1028)  Resp: 18 (08/19/22 1028)  BP: 100/61 (08/19/22 0737)  SpO2: 96 % (08/19/22 1028) Vital Signs (24h Range):  Temp:  [96.3 °F (35.7 °C)-98.8 °F (37.1 °C)] 97.6 °F (36.4 °C)  Pulse:  [] 88  Resp:  [14-18] 18  SpO2:  [93 %-97 %] 96 %  BP: ()/(45-69) 100/61     Weight: 68 kg (150 lb)  Body mass index is 20.34 kg/m².    Intake/Output Summary (Last 24 hours) at 8/19/2022 1045  Last data filed at 8/19/2022 0555  Gross per 24 hour   Intake 120 ml   Output 400 ml   Net -280 ml        Physical Exam  Vitals and nursing note reviewed.   Constitutional:       General: He is not in acute distress.     Appearance: He is cachectic. He is ill-appearing.      Comments: Appears fatigue   HENT:      Head: Normocephalic and atraumatic.      Mouth/Throat:      Mouth: Mucous membranes are moist.   Eyes:      Extraocular Movements: Extraocular movements intact.      Pupils: Pupils are equal, round, and reactive to light.   Cardiovascular:      Rate and Rhythm: Normal rate and regular rhythm.      Pulses: Normal pulses.      Heart sounds: Normal heart sounds. No murmur heard.    No friction rub. No gallop.   Pulmonary:      Effort: Pulmonary effort is normal. No respiratory distress.      Breath sounds: No wheezing or rhonchi.      Comments: Expiratory wheezing  appreciated bilaterally; good air movement  Abdominal:      General: Bowel sounds are normal. There is no distension.      Palpations: Abdomen is soft.      Tenderness: There is no abdominal tenderness. There is no guarding or rebound.   Musculoskeletal:         General: No swelling.      Cervical back: Normal range of motion and neck supple.      Right lower leg: No edema.      Left lower leg: No edema.   Skin:     General: Skin is warm and dry.      Capillary Refill: Capillary refill takes less than 2 seconds.      Findings: No bruising, erythema or rash.   Neurological:      General: No focal deficit present.      Mental Status: He is oriented to person, place, and time.      GCS: GCS eye subscore is 4. GCS verbal subscore is 5. GCS motor subscore is 6.   Psychiatric:         Mood and Affect: Mood normal. Affect is flat.         Behavior: Behavior is cooperative.       Significant Labs: All pertinent labs within the past 24 hours have been reviewed.    Significant Imaging: I have reviewed all pertinent imaging results/findings within the past 24 hours.

## 2022-08-19 NOTE — ASSESSMENT & PLAN NOTE
-Na 124 on admit  -With new findings on CT chest, suspect may be related to malignancy?  -Received 1xL NS bolus in ED  -now improved

## 2022-08-19 NOTE — PROGRESS NOTES
"   08/19/22 0728 08/19/22 0737   Vital Signs   Temp 96.3 °F (35.7 °C) 97.6 °F (36.4 °C)   Temp src Oral Oral   Pulse 105 103   Heart Rate Source Monitor Monitor   Resp 18 18   SpO2 (!) 93 % 95 %   BP (!) 82/45 100/61   MAP (mmHg) 56 76   BP Location Left arm Left arm   Patient Position Lying Lying     Bp cuff changed to smaller appropriated sized cuff. Pt aaox4, resp even and ulabored. Reports "feeling ok". Remains asymptomatic. In no acute distress.  "

## 2022-08-19 NOTE — PLAN OF CARE
6078  Message sent to the schedulers requesting a hospfu appt at the [facility] (TB clinic). Awaiting response.     1155  CM was informed by  Rica of a hospfu appt scheduled for the patient at the [facility] on 8/22/2022 at 1100. Information added to the patient's discharge paperwork.     1605  CM informed the patient via phone of possible discharge over the weekend & hospfu appt scheduled at the [facility]. Patient verbalized understanding.       Will continue to follow.

## 2022-08-19 NOTE — PROGRESS NOTES
[facility] Medicine  Progress Note    Patient Name: Hai Gonzales  MRN: [id number]  Patient Class: IP- Inpatient   Admission Date: 8/15/2022  Length of Stay: 4 days  Attending Physician: Gerardo Álvarez, *  Primary Care Provider: Primary Doctor No        Subjective:     Principal Problem:Hemoptysis        HPI:  Hai Gonzales is a 58 y/o male with tobacco abuse and no other significant history presents to [facility] ED with complaints of hemoptysis that started at 0430 today. His associated symptoms are night sweats, chills, intermittent subjective fever, and decreased appetite over the past two months. He states he has intermittent non-bloody diarrheal episodes over the past week. He also states he loss an approximated 20lbs over the past month. He denies history of homelessness, history of incarceration, or history of recent travel out of the country. He admits he is living with his girlfriend who he states he actively sick, however, he does not know the cause of her illness. 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 He reports smoking history of 1.5 packs daily x 40 years (60 PY).     Of note: CT chest without contrast on 5/24/2022 revealed Scattered cavitary lesions throughout the pulmonary parenchyma, most prominently involving the left lower lobe near the fissure. Patient had a pulmonary referral, and an attempt was made to reach out to the patient, however the number provided was the wrong number.     In the ED: BP 94/62   Pulse 89   Temp 99.5 °F (37.5 °C) (Oral)   Resp 20   Wt 68 kg (150 lb)   SpO2 97%   BMI 20.34 kg/m² Labswere remarkable for WBC 15.71, H/H 13.4/39.4, Na 124, Chloride 87. COVID-19 positive.   CTA chest revealed Worsening of left lower lobe and right upper lobe bronchiectasis, bronchial thickening and coalescent cavitation. He received 500ml LR x1, Duo-neb x1, Prednisone 40mg  PO x1, 80mg IV protonix, 1g Rocephin in ED. Pulmonary is consulted. Will admit to hospital medicine for further evaluation and treatment.       Overview/Hospital Course:  No notes on file    Interval History:  Still with hemoptysis, although does not feel short of breath.  Feels like he is essentially back to baseline.  No fevers today.  Awaiting MTB PCR to further guide treatment.  Discussed positive acid-fast bacilli with the patient.    Review of Systems   Constitutional:  Positive for fatigue. Negative for fever.   Respiratory:  Positive for cough (hemoptysis) and shortness of breath.    Cardiovascular:  Negative for chest pain and leg swelling.   Gastrointestinal:  Negative for nausea and vomiting.   Neurological:  Positive for weakness.   Objective:     Vital Signs (Most Recent):  Temp: 97.6 °F (36.4 °C) (08/19/22 0737)  Pulse: 88 (08/19/22 1028)  Resp: 18 (08/19/22 1028)  BP: 100/61 (08/19/22 0737)  SpO2: 96 % (08/19/22 1028) Vital Signs (24h Range):  Temp:  [96.3 °F (35.7 °C)-98.8 °F (37.1 °C)] 97.6 °F (36.4 °C)  Pulse:  [] 88  Resp:  [14-18] 18  SpO2:  [93 %-97 %] 96 %  BP: ()/(45-69) 100/61     Weight: 68 kg (150 lb)  Body mass index is 20.34 kg/m².    Intake/Output Summary (Last 24 hours) at 8/19/2022 1045  Last data filed at 8/19/2022 0555  Gross per 24 hour   Intake 120 ml   Output 400 ml   Net -280 ml        Physical Exam  Vitals and nursing note reviewed.   Constitutional:       General: He is not in acute distress.     Appearance: He is cachectic. He is ill-appearing.      Comments: Appears fatigue   HENT:      Head: Normocephalic and atraumatic.      Mouth/Throat:      Mouth: Mucous membranes are moist.   Eyes:      Extraocular Movements: Extraocular movements intact.      Pupils: Pupils are equal, round, and reactive to light.   Cardiovascular:      Rate and Rhythm: Normal rate and regular rhythm.      Pulses: Normal pulses.      Heart sounds: Normal heart sounds. No murmur heard.    No  friction rub. No gallop.   Pulmonary:      Effort: Pulmonary effort is normal. No respiratory distress.      Breath sounds: No wheezing or rhonchi.      Comments: Expiratory wheezing appreciated bilaterally; good air movement  Abdominal:      General: Bowel sounds are normal. There is no distension.      Palpations: Abdomen is soft.      Tenderness: There is no abdominal tenderness. There is no guarding or rebound.   Musculoskeletal:         General: No swelling.      Cervical back: Normal range of motion and neck supple.      Right lower leg: No edema.      Left lower leg: No edema.   Skin:     General: Skin is warm and dry.      Capillary Refill: Capillary refill takes less than 2 seconds.      Findings: No bruising, erythema or rash.   Neurological:      General: No focal deficit present.      Mental Status: He is oriented to person, place, and time.      GCS: GCS eye subscore is 4. GCS verbal subscore is 5. GCS motor subscore is 6.   Psychiatric:         Mood and Affect: Mood normal. Affect is flat.         Behavior: Behavior is cooperative.       Significant Labs: All pertinent labs within the past 24 hours have been reviewed.    Significant Imaging: I have reviewed all pertinent imaging results/findings within the past 24 hours.      Assessment/Plan:      * Hemoptysis  -patient presents to ED with complaints of hemoptysis x 1 day  -CXR chest revealed Large cavitary opacity in left lower lobe  -CTA chest revealed worsening of left lower lobe and right upper lobe bronchiectasis, bronchial thickening and coalescent cavitation  -T&S completed  -Monitor and trend CBC  -Pulmonary consulted and appreciate recs  -serial AFB q8 x3 positive; MTB PCR pending  -inhaled TXA tid discontinued  -Airborne/droplet precautions initiated    Acid-fast bacteria present  -noted and sputum samples  -awaiting MTB PCR      Cavitary lesion of lung  - concern for NTB vs malignancy vs TB  - serial AFB +  - HIV negative  - MTB PCR  pending  - ID consult      Hyponatremia  -Na 124 on admit  -With new findings on CT chest, suspect may be related to malignancy?  -Received 1xL NS bolus in ED  -now improved        COVID-19 virus infection  - COVID positive 8/15/22; initiated on protocol  - Isolation: Airborne/Droplet. Surgical mask on patient. Notify Infection Control  - CXR with large cavitary opacity in left lower lobe, no oxygen requirement on exam  - Monitor on Telemetry  -Received 1g Rocephin in ED x1  -completed course of Rocephin due to concern for superimposed CAP  - stop dexamethasone due to lack of hypoxia  - initiated on remdesivir x3d; daily CMP  - Order RT consult via Respiratory Communication for COVID Protocols  - Incentive Spirometer Q4h  - Continuous Pulse Oximetry, goal SpO2 >90%  - supplemental O2 PRN, will wean as tolerated  - Albuterol INH Q4h scheduled & PRN   - MVI & ascorbic acid 500mg PO BID  -Anti-tussives for cough  - Acetaminophen Q6hr PRN fever/headache  - Loperamide PRN viral diarrhea  - VTE PPx: will hold for now in setting of active hemoptysis  - PT/OT for debility/weakness    Nicotine dependence  -reports nicotine dependence with cigarettes (60 pack year history)  -Dangers of cigarette smoking were reviewed with patient in detail for 10 minutes and patient was encouraged to quit.  -Nicotine replacement options were discussed; refused nicotine patch at this time        Abnormal CT of the chest  -CTA chest revealed worsening of left lower lobe and right upper lobe bronchiectasis, bronchial thickening and coalescent cavitation  -Concern for TB vs non-tuberculous mycobacterial infection vs fungal infection  -Sputum culture ngtd; serial AFBs +   -Pulmonary consulted  -Airborne/Droplet precautions initiated        VTE Risk Mitigation (From admission, onward)         Ordered     Reason for No Pharmacological VTE Prophylaxis  Once        Question:  Reasons:  Answer:  Risk of Bleeding    08/15/22 1518     IP VTE HIGH RISK  PATIENT  Once         08/15/22 1518     Place sequential compression device  Until discontinued         08/15/22 1518                Discharge Planning   ZANE: 8/20/2022     Code Status: Full Code   Is the patient medically ready for discharge?:     Reason for patient still in hospital (select all that apply): Laboratory test and Consult recommendations  Discharge Plan A: Home, Home with family                  Gerardo Álvarez MD  Department of Hospital Medicine   [facility]

## 2022-08-20 VITALS
OXYGEN SATURATION: 95 % | DIASTOLIC BLOOD PRESSURE: 62 MMHG | SYSTOLIC BLOOD PRESSURE: 105 MMHG | HEIGHT: 72 IN | RESPIRATION RATE: 18 BRPM | HEART RATE: 94 BPM | TEMPERATURE: 98 F | BODY MASS INDEX: 20.32 KG/M2 | WEIGHT: 150 LBS

## 2022-08-20 PROCEDURE — 94640 AIRWAY INHALATION TREATMENT: CPT

## 2022-08-20 PROCEDURE — 99900035 HC TECH TIME PER 15 MIN (STAT)

## 2022-08-20 PROCEDURE — 27100098 HC SPACER

## 2022-08-20 PROCEDURE — 25000003 PHARM REV CODE 250

## 2022-08-20 RX ORDER — BENZONATATE 100 MG/1
100 CAPSULE ORAL 3 TIMES DAILY PRN
Qty: 30 CAPSULE | Refills: 0 | Status: SHIPPED | OUTPATIENT
Start: 2022-08-20 | End: 2022-08-30

## 2022-08-20 RX ADMIN — OXYCODONE HYDROCHLORIDE AND ACETAMINOPHEN 500 MG: 500 TABLET ORAL at 09:08

## 2022-08-20 RX ADMIN — ALBUTEROL SULFATE 2 PUFF: 90 AEROSOL, METERED RESPIRATORY (INHALATION) at 03:08

## 2022-08-20 RX ADMIN — ACETAMINOPHEN 650 MG: 325 TABLET ORAL at 09:08

## 2022-08-20 RX ADMIN — THERA TABS 1 TABLET: TAB at 09:08

## 2022-08-20 RX ADMIN — ALBUTEROL SULFATE 2 PUFF: 90 AEROSOL, METERED RESPIRATORY (INHALATION) at 12:08

## 2022-08-20 NOTE — HOSPITAL COURSE
Mr. Gonzales presented with hemoptysis and large cavitation and left lower lobe CTA.  Tested positive for COVID.  He was initiated on remdesivir and dexamethasone, although the steroid was quickly discontinued as he did not require supplemental oxygen.  Serial AFB smears were performed which were positive.  MTB PCR negative.  Discussed case with pulmonology and Infectious Disease; likely patient has MAC.  Further treatment pending culture results and sensitivities.  Have arranged for outpatient follow-up at [facility] and have advised extensively on quarantine/isolation precautions.  Have called his significant other and discussed on the phone as well.    /62 (BP Location: Right arm, Patient Position: Lying)   Pulse 94   Temp 98.2 °F (36.8 °C) (Oral)   Resp 18   Ht 6' (1.829 m)   Wt 68 kg (150 lb)   SpO2 95%   BMI 20.34 kg/m²     Physical Exam  Vitals and nursing note reviewed.   Constitutional:       General: He is not in acute distress.     Appearance: He is cachectic. He is ill-appearing.      Comments: Appears fatigue   HENT:      Head: Normocephalic and atraumatic.      Mouth/Throat:      Mouth: Mucous membranes are moist.   Eyes:      Extraocular Movements: Extraocular movements intact.      Pupils: Pupils are equal, round, and reactive to light.   Cardiovascular:      Rate and Rhythm: Normal rate and regular rhythm.      Pulses: Normal pulses.      Heart sounds: Normal heart sounds. No murmur heard.    No friction rub. No gallop.   Pulmonary:      Effort: Pulmonary effort is normal. No respiratory distress.      Breath sounds: No wheezing or rhonchi.      Comments: Expiratory wheezing appreciated bilaterally; good air movement  Abdominal:      General: Bowel sounds are normal. There is no distension.      Palpations: Abdomen is soft.      Tenderness: There is no abdominal tenderness. There is no guarding or rebound.   Musculoskeletal:         General: No swelling.      Cervical back: Normal  range of motion and neck supple.      Right lower leg: No edema.      Left lower leg: No edema.   Skin:     General: Skin is warm and dry.      Capillary Refill: Capillary refill takes less than 2 seconds.      Findings: No bruising, erythema or rash.   Neurological:      General: No focal deficit present.      Mental Status: He is oriented to person, place, and time.      GCS: GCS eye subscore is 4. GCS verbal subscore is 5. GCS motor subscore is 6.   Psychiatric:         Mood and Affect: Mood normal. Affect is flat.         Behavior: Behavior is cooperative.

## 2022-08-20 NOTE — DISCHARGE INSTRUCTIONS
We have tested you for tuberculosis (TB). The preliminary testing was positive for the category of bacteria that includes tuberculosis; however, additional testing was negative for TB. Likely, you do not have TB, but instead another similar type of infection. Unfortunately, this type of bacteria can quickly develop drug resistance, so in order to best treat you, we need to wait for cultures to return, which can take 2 weeks or more. During this time, we would advise you to quarantine at home. We will contact you with an appointment at our TB clinic in a week. At that time, we may be able to start treatment if we have the culture results back.

## 2022-08-20 NOTE — PROGRESS NOTES
Discharge orders noted. Additional clinical references attached. Patient's discharge instructions given by bedside RN and reviewed via this VN.  Education provided on new medication, diagnosis, and follow-up appointments.  Teach back method used. Patient verbalized understanding. All questions answered. Patient awaiting family for pickup. Bedside nurse to request transport to [facility] when ready.     08/20/22 4986   AVS Confirmation   Discharge instructions and AVS given to and reviewed with patient and/or significant other. Yes

## 2022-08-20 NOTE — PLAN OF CARE
[facility] ID note    Patient with negative MTB PCR.  Patient is still in isolation for COVID-19.    Can go home if otherwise stable and of pulmonary agrees with isolation at home because of COVID-19 and follow-up in TB Clinic as well as primary care.    No treatment at this time.    Nate Martinez  [facility] ID

## 2022-08-20 NOTE — NURSING
Patient discharged. IV and heart monitor removed. AVS provided to patient and virtual nurse reviewed discharge education with patient. Patient left via wheelchair with family. No distress noted.

## 2022-08-20 NOTE — DISCHARGE SUMMARY
[facility] Medicine  Discharge Summary      Patient Name: Hai Gonzales  MRN: [id number]  Patient Class: IP- Inpatient  Admission Date: 8/15/2022  Hospital Length of Stay: 5 days  Discharge Date and Time:  08/20/2022 4:53 PM  Attending Physician: Gerardo Álvarez, *   Discharging Provider: Gerardo Álvarez MD  Primary Care Provider: Primary Doctor No      HPI:   Hai Gonzales is a 58 y/o male with tobacco abuse and no other significant history presents to [facility] ED with complaints of hemoptysis that started at 0430 today. His associated symptoms are night sweats, chills, intermittent subjective fever, and decreased appetite over the past two months. He states he has intermittent non-bloody diarrheal episodes over the past week. He also states he loss an approximated 20lbs over the past month. He denies history of homelessness, history of incarceration, or history of recent travel out of the country. He admits he is living with his girlfriend who he states he actively sick, however, he does not know the cause of her illness. 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 He reports smoking history of 1.5 packs daily x 40 years (60 PY).     Of note: CT chest without contrast on 5/24/2022 revealed Scattered cavitary lesions throughout the pulmonary parenchyma, most prominently involving the left lower lobe near the fissure. Patient had a pulmonary referral, and an attempt was made to reach out to the patient, however the number provided was the wrong number.     In the ED: BP 94/62   Pulse 89   Temp 99.5 °F (37.5 °C) (Oral)   Resp 20   Wt 68 kg (150 lb)   SpO2 97%   BMI 20.34 kg/m² Labswere remarkable for WBC 15.71, H/H 13.4/39.4, Na 124, Chloride 87. COVID-19 positive.   CTA chest revealed Worsening of left lower lobe and right upper lobe bronchiectasis, bronchial thickening and coalescent  cavitation. He received 500ml LR x1, Duo-neb x1, Prednisone 40mg PO x1, 80mg IV protonix, 1g Rocephin in ED. Pulmonary is consulted. Will admit to hospital medicine for further evaluation and treatment.       * No surgery found *      Hospital Course:   Mr. Gonzales presented with hemoptysis and large cavitation and left lower lobe CTA.  Tested positive for COVID.  He was initiated on remdesivir and dexamethasone, although the steroid was quickly discontinued as he did not require supplemental oxygen.  Serial AFB smears were performed which were positive.  MTB PCR negative.  Discussed case with pulmonology and Infectious Disease; likely patient has MAC.  Further treatment pending culture results and sensitivities.  Have arranged for outpatient follow-up at [facility] and have advised extensively on quarantine/isolation precautions.  Have called his significant other and discussed on the phone as well.    /62 (BP Location: Right arm, Patient Position: Lying)   Pulse 94   Temp 98.2 °F (36.8 °C) (Oral)   Resp 18   Ht 6' (1.829 m)   Wt 68 kg (150 lb)   SpO2 95%   BMI 20.34 kg/m²     Physical Exam  Vitals and nursing note reviewed.   Constitutional:       General: He is not in acute distress.     Appearance: He is cachectic. He is ill-appearing.      Comments: Appears fatigue   HENT:      Head: Normocephalic and atraumatic.      Mouth/Throat:      Mouth: Mucous membranes are moist.   Eyes:      Extraocular Movements: Extraocular movements intact.      Pupils: Pupils are equal, round, and reactive to light.   Cardiovascular:      Rate and Rhythm: Normal rate and regular rhythm.      Pulses: Normal pulses.      Heart sounds: Normal heart sounds. No murmur heard.    No friction rub. No gallop.   Pulmonary:      Effort: Pulmonary effort is normal. No respiratory distress.      Breath sounds: No wheezing or rhonchi.      Comments: Expiratory wheezing appreciated bilaterally; good air movement  Abdominal:       General: Bowel sounds are normal. There is no distension.      Palpations: Abdomen is soft.      Tenderness: There is no abdominal tenderness. There is no guarding or rebound.   Musculoskeletal:         General: No swelling.      Cervical back: Normal range of motion and neck supple.      Right lower leg: No edema.      Left lower leg: No edema.   Skin:     General: Skin is warm and dry.      Capillary Refill: Capillary refill takes less than 2 seconds.      Findings: No bruising, erythema or rash.   Neurological:      General: No focal deficit present.      Mental Status: He is oriented to person, place, and time.      GCS: GCS eye subscore is 4. GCS verbal subscore is 5. GCS motor subscore is 6.   Psychiatric:         Mood and Affect: Mood normal. Affect is flat.         Behavior: Behavior is cooperative.        Goals of Care Treatment Preferences:  Code Status: Full Code      Consults:   Consults (From admission, onward)        Status Ordering Provider     Inpatient consult to Infectious Diseases  Once        Provider:  (Not yet assigned)    Completed FRANSISCO LEWIS     Pulmonology  Once        Provider:  (Not yet assigned)    Completed PATRICE DAVALOS          No new Assessment & Plan notes have been filed under this hospital service since the last note was generated.  Service: Hospital Medicine    Final Active Diagnoses:    Diagnosis Date Noted POA    PRINCIPAL PROBLEM:  Hemoptysis [R04.2] 08/15/2022 Yes    Acid-fast bacteria present [R89.9]  Yes    Cavitary lesion of lung [J98.4]  Yes    Abnormal CT of the chest [R93.89] 08/15/2022 Yes    Nicotine dependence [F17.200] 08/15/2022 Yes    COVID-19 virus infection [U07.1] 08/15/2022 Yes    Hyponatremia [E87.1] 08/15/2022 Yes      Problems Resolved During this Admission:    Diagnosis Date Noted Date Resolved POA    Leukocytosis [D72.829] 08/15/2022 08/16/2022 Yes       Discharged Condition: good    Disposition: Home or Self Care    Follow  Up:   Follow-up Information     Lela Quintana MD Follow up on 8/25/2022.    Specialty: Internal Medicine  Why: at 1:00pm; hospital follow up appointment in [facility]  Contact information:  [address]  [address]  [place] [address]  [phone]             [facility]. Schedule an appointment as soon as possible for a visit on 8/22/2022.    Why: at 11:00 AM; Hospital follow up appointment  Contact information:  [address] [place], [place], [address]  (p) [phone]  (f) [phone]                     Patient Instructions:      Ambulatory referral/consult to Infectious Disease   Standing Status: Future   Referral Priority: Routine Referral Type: Consultation   Referral Reason: Specialty Services Required   Requested Specialty: Infectious Diseases   Number of Visits Requested: 1     Diet Adult Regular     Notify your health care provider if you experience any of the following:  temperature >100.4     Notify your health care provider if you experience any of the following:  persistent nausea and vomiting or diarrhea     Notify your health care provider if you experience any of the following:  severe uncontrolled pain     Notify your health care provider if you experience any of the following:  difficulty breathing or increased cough     Notify your health care provider if you experience any of the following:  severe persistent headache     Notify your health care provider if you experience any of the following:  persistent dizziness, light-headedness, or visual disturbances     Notify your health care provider if you experience any of the following:  increased confusion or weakness     COVID-19 Surveillance Program     Order Specific Question Answer Comments   Does patient have a smartphone? Yes    Does patient have the [facility] vikas on their smartphone? Yes    While in surveillance program, will patient be using home oxygen? No      Activity as tolerated       Significant  Diagnostic Studies: Microbiology:   Sputum Culture   Lab Results   Component Value Date    GSRESP <10 epithelial cells per low power field. 08/15/2022    GSRESP Rare WBC's 08/15/2022    GSRESP Rare Gram positive cocci 08/15/2022    GSRESP Rare Gram negative rods 08/15/2022    GSRESP <10 epithelial cells per low power field. 08/15/2022    GSRESP Moderate WBC's  08/15/2022    GSRESP Rare Gram positive cocci 08/15/2022    RESPIRATORYC Normal respiratory paige 08/15/2022    RESPIRATORYC No S aureus or Pseudomonas isolated. 08/15/2022    RESPIRATORYC Normal respiratory paige 08/15/2022    RESPIRATORYC No S aureus or Pseudomonas isolated. 08/15/2022     Radiology: CT scan:   EXAMINATION:  CTA CHEST NON CORONARY     CLINICAL HISTORY:  Lung/mediastinal abscess;Pulmonary embolism (PE) suspected, high prob;     TECHNIQUE:  Low dose axial images, sagittal and coronal reformations were obtained from the thoracic inlet to the lung bases following the IV administration of 75 mL of Omnipaque 350.  Contrast timing was optimized to evaluate the pulmonary arteries.     COMPARISON:  Chest x-ray same day and previous CT chest 05/24/2022     FINDINGS:  There are no filling defects in the pulmonary arteries.  No CT evidence for right heart strain.     Heart is normal size.  Thoracic aorta is normal caliber.     Diffuse mediastinal and hilar adenopathy is present.     Biapical emphysema and pleuroparenchymal scarring and pleural thickening noted.  Extensive coalescent thick-walled cavitary lung opacity in the left lower lobe associated with bronchiectasis and adjacent tree-in-bud nodular opacity has worsened compared with the prior CT, now at least 10 cm in AP diameter.  Additional satellite cavitary lesions versus peripheral areas of bronchiectasis are noted and there is some tree-in-bud opacity in the anterior left lower lobe.  An air-fluid level is present on today's examination which may indicate blood, pus, secretions etcetera.  In  the anterior right upper lobe there are multiple smaller cavitary lesions which are also enlarged compared with the prior the largest measuring 2.5 cm.     Regional osseous structures are unremarkable with expected age related degenerative changes.  Limited evaluation of upper abdomen is unremarkable.     Impression:     No pulmonary artery thromboembolism.     Worsening of left lower lobe and right upper lobe bronchiectasis, bronchial thickening and coalescent cavitation.  Tuberculosis should be strongly considered as well as non tuberculous mycobacterial infectiond and fungal infection.  Superimposed infection or hemorrhage into the large left lower lobe cavity is not excluded.       Pending Diagnostic Studies:     None         Medications:  Reconciled Home Medications:      Medication List      START taking these medications    benzonatate 100 MG capsule  Commonly known as: TESSALON  Take 1 capsule (100 mg total) by mouth 3 (three) times daily as needed for Cough.     pulse oximeter device  Commonly known as: pulse oximeter  by Apply Externally route 2 (two) times a day. Use twice daily at 8 AM and 3 PM and record the value in MyChart as directed.        CONTINUE taking these medications    LIDOcaine 5 %  Commonly known as: LIDODERM  Place 1 patch onto the skin once daily. Remove & Discard patch within 12 hours or as directed by MD        STOP taking these medications    naproxen 500 MG tablet  Commonly known as: NAPROSYN            Indwelling Lines/Drains at time of discharge:   Lines/Drains/Airways     None                 Time spent on the discharge of patient: 50 minutes         Gerardo Álvarez MD  Department of Hospital Medicine  [facility]

## 2022-08-20 NOTE — PLAN OF CARE
The sw spoke to the pt via his cell phone. The sw informed him the schedulers or [place]syed reach out to him with his new [facility] follow up due to him having Covid. The pt acknowledged understanding. He states his fiance Hellen will transport him home at d/c. The pt was reminded of his P[facility] appt with Dr. Quintana 8/25/22 and he states he will attend. The pt has no further Case Management needs.        08/20/22 1102   Final Note   Assessment Type Final Discharge Note   Anticipated Discharge Disposition Home   What phone number can be called within the next 1-3 days to see how you are doing after discharge? [phone]   Hospital Resources/Appts/Education Provided Appointments scheduled and added to AVS   Post-Acute Status   Other Status Awaiting f/u Appts  (The schedulers or [place]will reach out to the pt with his new hospital follow ups.)   Discharge Delays None known at this time

## 2022-08-20 NOTE — PROGRESS NOTES
The sw spoke to the pt and informed him he will be d/c'd today. The sw informed the pt not to go to the appointment scheduled for tomorrow with the [facility]. The sw did remind the pt of the appt with Dr. Quintana at the [facility] 8/25/22 and he states he will go. The sw informed him [place]will follow up with him Monday with a follow up appt for the [facility]. The pt acknowledged understanding and states his hari Macedo is transporting him home at d/c. The pt states he has no further Case Management needs.

## 2022-08-21 ENCOUNTER — NURSE TRIAGE (OUTPATIENT)
Dept: ADMINISTRATIVE | Facility: CLINIC | Age: 57
End: 2022-08-21
Payer: MEDICAID

## 2022-08-21 NOTE — TELEPHONE ENCOUNTER
1st enrollment call - unable to make phone contact - No answer or voice mail. My chart status pending.

## 2022-08-22 ENCOUNTER — NURSE TRIAGE (OUTPATIENT)
Dept: ADMINISTRATIVE | Facility: CLINIC | Age: 57
End: 2022-08-22
Payer: MEDICAID

## 2022-08-22 NOTE — TELEPHONE ENCOUNTER
2nd enrollment call - unable to make phone contact to complete enrollment process. Placed pt in [id number] and task for surveillance self enrollment left in place.    Reason for Disposition   Caller has cancelled the call before the first contact    Protocols used: NO CONTACT OR DUPLICATE CONTACT CALL-A-AH

## 2022-08-22 NOTE — PLAN OF CARE
[facility] - Telemetry  Discharge Final Note    Primary Care Provider: Primary Doctor No    Expected Discharge Date: 8/20/2022    Final Discharge Note (most recent)       Final Note - 08/22/22 0726          Final Note    Assessment Type Final Discharge Note (P)      Anticipated Discharge Disposition Home or Self Care (P)      Hospital Resources/Appts/Education Provided Appointments scheduled and added to AVS (P)                      Important Message from Medicare             Contact Info       Lela Quintana MD   Specialty: Internal Medicine    [address]  [address]  [place] [address]   Phone: [phone]       Next Steps: Follow up on 8/25/2022    Instructions: at 1:00pm; hospital follow [facility] appointment [facility]    [facility]    [address] [place] [address], [address]  (p) [phone]  (f) [phone]       Next Steps: Schedule an appointment as soon as possible for a visit on 8/22/2022    Instructions: at 11:00 AM; Hospital follow up appointment          Discharge summary faxed to the [facility] (f [phone]).    8/22/2022 0808  Voicemail message left for the [facility] informing of the patient's previously scheduled appointment today at 1100 & the patient's (+) covid status as of 8/15/2022. Awaiting return call to have appointment rescheduled.     8/22/2022 1020  CM was informed by the [facility] that they will contact the pt to reschedule the hospfu appt.

## 2022-08-25 PROBLEM — E87.1 HYPONATREMIA: Status: RESOLVED | Noted: 2022-08-15 | Resolved: 2022-08-25

## 2022-08-25 NOTE — PROGRESS NOTES
[facility]   New Visit Progress Note   Recent Hospital Discharge     PRESENTING HISTORY     Chief Complaint/Reason for Admission:  Follow up Hospital Discharge   PCP: Primary Doctor No    History of Present Illness:  Mr. Hai Gonzales is a 57 y.o. male who was recently admitted to the hospital.    [facility] Medicine  Discharge Summary        Patient Name: Hai Gonzales  MRN: [id number]  Patient Class: IP- Inpatient  Admission Date: 8/15/2022  Hospital Length of Stay: 5 days  Discharge Date and Time:  08/20/2022 4:53 PM  Attending Physician: Gerardo Álvarez, *   Discharging Provider: Gerardo Álvarez MD  Primary Care Provider: Primary Doctor No  ___________________________________________________________________    Today:  Presents to [facility] for initial hospital follow up.  Recently hospitalized for evaluation of fever, night sweats, weight loss, cough, and hemoptysis.  Imaging significant for bilateral cavitary lesions.  COVID-19 Positive on admission.   Admitted to [facility] Medicine service.  Remdesivir/Dexamethasone initiated for COVID treatment, but dexamethasone subsequently discontinued as his respiratory status was stable on room air.   Seen in consultation with ID and Pulmonary teams.  3 Serial AFB smears POSITIVE -> final ID pending.  M. tuberculosis DNA by PCR NEGATIVE.  HIV 1/2 AB NEGATIVE.   Patient discharged to home with plans for [facility] follow up on 8/22/22.    Patient accompanied today by significant other.  He is ambulatory and independent with ADL's.  Reports continued cough productive of thick brown sputum.  Reports ongoing generalized weakness, poor appetite, weight loss.  Night sweats and fevers have resolved.  Missed [facility] follow up on 8/22/22 due to still being in COVID isolation- appt has not yet been rescheduled.   ABF smears still pending final identification.     Review of Systems  General ROS: negative for chills, fever +  weight loss  + generalized weakness  Psychological ROS: negative for hallucination, depression or suicidal ideation  Ophthalmic ROS: negative for blurry vision, photophobia or eye pain  ENT ROS: negative for epistaxis, sore throat or rhinorrhea  Respiratory ROS: + cough productive of thick brown sputum, no shortness of breath, or wheezing  Cardiovascular ROS: no chest pain or dyspnea on exertion  Gastrointestinal ROS: no abdominal pain, change in bowel habits, or black/ bloody stools  Genito-Urinary ROS: no dysuria, trouble voiding, or hematuria  Musculoskeletal ROS: negative for gait disturbance or muscular weakness  Neurological ROS: no syncope or seizures; no ataxia  Dermatological ROS: negative for pruritis, rash and jaundice      PAST HISTORY:     No past medical history on file.    No past surgical history on file.    No family history on file.      MEDICATIONS & ALLERGIES:     Current Outpatient Medications on File Prior to Visit   Medication Sig Dispense Refill    pulse oximeter (PULSE OXIMETER) device by Apply Externally route 2 (two) times a day. Use twice daily at 8 AM and 3 PM and record the value in [place] as directed. 1 each 0    benzonatate (TESSALON) 100 MG capsule Take 1 capsule (100 mg total) by mouth 3 (three) times daily as needed for Cough. (Patient not taking: Reported on 8/26/2022) 30 capsule 0    LIDOcaine (LIDODERM) 5 % Place 1 patch onto the skin once daily. Remove & Discard patch within 12 hours or as directed by MD (Patient not taking: Reported on 8/26/2022) 5 patch 0     No current facility-administered medications on file prior to visit.        Review of patient's allergies indicates:  No Known Allergies    OBJECTIVE:     Vital Signs:  /63 (BP Location: Right arm, Patient Position: Sitting, BP Method: Small (Automatic))   Pulse 98   Temp 98.9 °F (37.2 °C) (Oral)   Wt 63.5 kg (139 lb 15.9 oz)   SpO2 (!) 94%   BMI 18.99 kg/m²   Wt Readings from Last 3 Encounters:   08/16/22  1140 68 kg (150 lb)   08/15/22 1042 68 kg (150 lb)   05/24/22 1257 74.8 kg (165 lb)     Body mass index is 18.99 kg/m².        Physical Exam:  /63 (BP Location: Right arm, Patient Position: Sitting, BP Method: Small (Automatic))   Pulse 98   Temp 98.9 °F (37.2 °C) (Oral)   Wt 63.5 kg (139 lb 15.9 oz)   SpO2 (!) 94%   BMI 18.99 kg/m²   General appearance: alert, cooperative, no distress  Constitutional:Oriented to person, place, and time  + ill appearing    HEENT: Normocephalic, atraumatic, neck symmetrical, no nasal discharge   Eyes: conjunctivae/corneas clear, PERRL, EOM's intact  Lungs: + scattered expiratory wheezing   Heart: regular rate and rhythm without rub; no displacement of the PMI   Abdomen: soft, non-tender; bowel sounds normoactive; no organomegaly  Extremities: extremities symmetric; no clubbing, cyanosis, or edema  Integument: Skin color, texture, turgor normal; no rashes; hair distrubution normal  Neurologic: Alert and oriented X 3, normal strength, normal coordination and gait  Psychiatric: no pressured speech; normal affect; no evidence of impaired cognition     Laboratory  Lab Results   Component Value Date    WBC 8.69 08/17/2022    HGB 12.3 (L) 08/17/2022    HCT 36.5 (L) 08/17/2022    MCV 90 08/17/2022     08/17/2022     BMP  Lab Results   Component Value Date     (L) 08/17/2022    K 4.9 08/17/2022    CL 97 08/17/2022    CO2 23 08/17/2022    BUN 11 08/17/2022    CREATININE 0.5 08/17/2022    CALCIUM 8.3 (L) 08/17/2022    ANIONGAP 11 08/17/2022     Lab Results   Component Value Date    ALT 13 08/17/2022    AST 16 08/17/2022    ALKPHOS 60 08/17/2022    BILITOT 0.2 08/17/2022     Lab Results   Component Value Date    INR 1.2 08/15/2022    INR 1.2 08/15/2022     No results found for: HGBA1C    Diagnostic Results:    CTA Chest 8/15/22:  There are no filling defects in the pulmonary arteries.  No CT evidence for right heart strain.  Heart is normal size.  Thoracic aorta is normal  caliber.  Diffuse mediastinal and hilar adenopathy is present.  Biapical emphysema and pleuroparenchymal scarring and pleural thickening noted.  Extensive coalescent thick-walled cavitary lung opacity in the left lower lobe associated with bronchiectasis and adjacent tree-in-bud nodular opacity has worsened compared with the prior CT, now at least 10 cm in AP diameter.  Additional satellite cavitary lesions versus peripheral areas of bronchiectasis are noted and there is some tree-in-bud opacity in the anterior left lower lobe.  An air-fluid level is present on today's examination which may indicate blood, pus, secretions etcetera.  In the anterior right upper lobe there are multiple smaller cavitary lesions which are also enlarged compared with the prior the largest measuring 2.5 cm.  Regional osseous structures are unremarkable with expected age related degenerative changes.  Limited evaluation of upper abdomen is unremarkable.    Impression:  No pulmonary artery thromboembolism.  Worsening of left lower lobe and right upper lobe bronchiectasis, bronchial thickening and coalescent cavitation.  Tuberculosis should be strongly considered as well as non tuberculous mycobacterial infectiond and fungal infection.  Superimposed infection or hemorrhage into the large left lower lobe cavity is not excluded.      ASSESSMENT & PLAN:       Mycobacterium infection  - 3 POSITIVE AFB smears, final identification pending  - concern for Mycobacterial infection, possibly MAC  - M. tuberculosis DNA by PCR NEGATIVE.  - [facility] appt rescheduled to Monday 8/29/22 at 9 AM  -     CBC Auto Differential; Future; Expected date: 08/27/2022  -     Basic Metabolic Panel; Future; Expected date: 08/26/2022  -     HEPATITIS PANEL, ACUTE; Future; Expected date: 08/26/2022  -     TSH; Future; Expected date: 08/26/2022  -     X-Ray Chest PA And Lateral; Future; Expected date: 08/26/2022    COVID-19 virus infection  - completed Remdesivir  while hospitalized  - respiratory status stable on room air    I will [facility] again [facility] 9/16/22.   Instructions for the patient:      Scheduled Follow-up :  Future Appointments   Date Time Provider Department Center   9/16/2022  8:30 AM Lela Quintana MD [facility] [place]       Post Visit Medication List:     Medication List          Accurate as of August 26, 2022 11:51 AM. If you have any questions, ask your nurse or doctor.            CONTINUE taking these medications    benzonatate 100 MG capsule  Commonly known as: TESSALON  Take 1 capsule (100 mg total) by mouth 3 (three) times daily as needed for Cough.     LIDOcaine 5 %  Commonly known as: LIDODERM  Place 1 patch onto the skin once daily. Remove & Discard patch within 12 hours or as directed by MD     pulse oximeter device  Commonly known as: pulse oximeter  by Apply Externally route 2 (two) times a day. Use twice daily at 8 AM and 3 PM and record the value in MyChart as directed.            Signing Physician:  Lela Quintana MD

## 2022-08-25 NOTE — PHYSICIAN QUERY
PT Name: Hai Gonzales  MR #: [id number]     DOCUMENTATION CLARIFICATION      CDS/: Radha Good RN CCDS            Contact information:[email or web address]    This form is a permanent document in the medical record.     Query Date: August 25, 2022    Dear Provider,  By submitting this query, we are merely seeking further clarification of documentation.  Please utilize your independent clinical judgment when addressing the question(s) below.     The Medical Record contains the following:    Supporting Clinical Findings Location in Medical Record   Leukocytosis  -likely reactive in setting of COVID-19 infection and concern for underlying infectious superimposed pulmonary infection  -Procalcitonin pending  -Received 1g Rocephin in ED  -Will continue azithromycin/rocephin for CAP coverage for now; will de-escalate as needed    Respiratory:  Positive for cough and wheezing.  Positive for hemoptysis H&P     Rocephin IV- 4 doses  Azithromycin IV- 3 doses      Worsening of left lower lobe and right upper lobe bronchiectasis, bronchial thickening and coalescent cavitation.Tuberculosis should be strongly considered as well as non tuberculous mycobacterial infectiond and fungal infection.Superimposed  infection or hemorrhage into the large left lower lobe cavity is not excluded. CTA chest 8/15     WBC=15.71, 9.31, 8.69  Lactate and procal WNL    Labs noted.  AFB x3 positive.  Routine respiratory culture with normal paige. CT scan with multiple blebs at the apices as well as cavitary lesions in the mid and lower lung zones.  Left large lower cavity with round mass in the center.     Would stop treatment for community-acquired pneumonia at this time.  Follow-up MTB PCR.  If positive would start treatment for tuberculosis.  If negative would wait for identification of atypical mycobacteria to determine therapy.      COVID-19 virus infection  - COVID positive 8/15/22; initiated on protocol  - Isolation:  Airborne/Droplet. Surgical mask on patient. Notify Infection Control  - CXR with large cavitary opacity in left lower lobe, no oxygen requirement on exam  - Monitor on Telemetry  -Received 1g Rocephin in ED x1  -completed course of Rocephin due to concern for superimposed CAP  - stop dexamethasone due to lack of hypoxia  - initiated on remdesivir x3d; daily CMP   Lab 8/15-8/20      Infectious disease CN 8/18                        Hospital medicine PN 8/19     Please clarify if the ___Pneumonia ______ diagnosis has been:    [  ] COVID pneumonia with super imposed CAP ruled in    [ x ] Community acquired pneumonia only ruled in   [  ] COVID pneumonia only ruled in   [  ] Pneumonia ruled out   [  ] Other/Clarification of findings (please specify): _______________    [   ] Clinically undetermined           Please document in your progress notes daily for the duration of treatment, until resolved, and include in your discharge summary.    Form No. [id number]

## 2022-08-26 ENCOUNTER — OFFICE VISIT (OUTPATIENT)
Dept: PRIMARY CARE CLINIC | Facility: CLINIC | Age: 57
End: 2022-08-26
Payer: MEDICAID

## 2022-08-26 ENCOUNTER — HOSPITAL ENCOUNTER (OUTPATIENT)
Dept: RADIOLOGY | Facility: HOSPITAL | Age: 57
Discharge: HOME | End: 2022-08-26
Attending: INTERNAL MEDICINE
Payer: MEDICAID

## 2022-08-26 VITALS
SYSTOLIC BLOOD PRESSURE: 105 MMHG | TEMPERATURE: 99 F | HEART RATE: 98 BPM | WEIGHT: 140 LBS | DIASTOLIC BLOOD PRESSURE: 63 MMHG | OXYGEN SATURATION: 94 % | BODY MASS INDEX: 18.99 KG/M2

## 2022-08-26 DIAGNOSIS — U07.1 COVID-19 VIRUS INFECTION: ICD-10-CM

## 2022-08-26 DIAGNOSIS — A31.9 MYCOBACTERIUM INFECTION: ICD-10-CM

## 2022-08-26 DIAGNOSIS — A31.9 MYCOBACTERIUM INFECTION: Primary | ICD-10-CM

## 2022-08-26 PROCEDURE — 3078F PR MOST RECENT DIASTOLIC BLOOD PRESSURE < 80 MM HG: ICD-10-PCS | Mod: CPTII,,, | Performed by: INTERNAL MEDICINE

## 2022-08-26 PROCEDURE — 99213 OFFICE O/P EST LOW 20 MIN: CPT | Mod: PBBFAC,25,PO | Performed by: INTERNAL MEDICINE

## 2022-08-26 PROCEDURE — 1159F PR MEDICATION LIST DOCUMENTED IN MEDICAL RECORD: ICD-10-PCS | Mod: CPTII,,, | Performed by: INTERNAL MEDICINE

## 2022-08-26 PROCEDURE — 3074F SYST BP LT 130 MM HG: CPT | Mod: CPTII,,, | Performed by: INTERNAL MEDICINE

## 2022-08-26 PROCEDURE — 1111F PR DISCHARGE MEDS RECONCILED W/ CURRENT OUTPATIENT MED LIST: ICD-10-PCS | Mod: CPTII,,, | Performed by: INTERNAL MEDICINE

## 2022-08-26 PROCEDURE — 99999 PR PBB SHADOW E&M-EST. PATIENT-LVL III: ICD-10-PCS | Mod: PBBFAC,,, | Performed by: INTERNAL MEDICINE

## 2022-08-26 PROCEDURE — 71046 X-RAY EXAM CHEST 2 VIEWS: CPT | Mod: TC,FY

## 2022-08-26 PROCEDURE — 99205 OFFICE O/P NEW HI 60 MIN: CPT | Mod: S$PBB,,, | Performed by: INTERNAL MEDICINE

## 2022-08-26 PROCEDURE — 99999 PR PBB SHADOW E&M-EST. PATIENT-LVL III: CPT | Mod: PBBFAC,,, | Performed by: INTERNAL MEDICINE

## 2022-08-26 PROCEDURE — 3078F DIAST BP <80 MM HG: CPT | Mod: CPTII,,, | Performed by: INTERNAL MEDICINE

## 2022-08-26 PROCEDURE — 71046 XR CHEST PA AND LATERAL: ICD-10-PCS | Mod: 26,,, | Performed by: STUDENT IN AN ORGANIZED HEALTH CARE EDUCATION/TRAINING PROGRAM

## 2022-08-26 PROCEDURE — 99205 PR OFFICE/OUTPT VISIT, NEW, LEVL V, 60-74 MIN: ICD-10-PCS | Mod: S$PBB,,, | Performed by: INTERNAL MEDICINE

## 2022-08-26 PROCEDURE — 1159F MED LIST DOCD IN RCRD: CPT | Mod: CPTII,,, | Performed by: INTERNAL MEDICINE

## 2022-08-26 PROCEDURE — 3008F PR BODY MASS INDEX (BMI) DOCUMENTED: ICD-10-PCS | Mod: CPTII,,, | Performed by: INTERNAL MEDICINE

## 2022-08-26 PROCEDURE — 1111F DSCHRG MED/CURRENT MED MERGE: CPT | Mod: CPTII,,, | Performed by: INTERNAL MEDICINE

## 2022-08-26 PROCEDURE — 71046 X-RAY EXAM CHEST 2 VIEWS: CPT | Mod: 26,,, | Performed by: STUDENT IN AN ORGANIZED HEALTH CARE EDUCATION/TRAINING PROGRAM

## 2022-08-26 PROCEDURE — 3008F BODY MASS INDEX DOCD: CPT | Mod: CPTII,,, | Performed by: INTERNAL MEDICINE

## 2022-08-26 PROCEDURE — 3074F PR MOST RECENT SYSTOLIC BLOOD PRESSURE < 130 MM HG: ICD-10-PCS | Mod: CPTII,,, | Performed by: INTERNAL MEDICINE

## 2022-09-06 DIAGNOSIS — A31.0 MYCOBACTERIUM AVIUM-INTRACELLULARE INFECTION: Primary | ICD-10-CM

## 2022-09-06 NOTE — PROGRESS NOTES
Discussed with patient via telephone-   Sputum Cx with growth of MAC.  Referrals placed to Pulmonary and ID teams.  My staff will schedule and call patient with appts.  Patient voiced understanding.

## 2022-09-08 ENCOUNTER — TELEPHONE (OUTPATIENT)
Dept: INFECTIOUS DISEASES | Facility: CLINIC | Age: 57
End: 2022-09-08
Payer: MEDICAID

## 2022-09-08 ENCOUNTER — TELEPHONE (OUTPATIENT)
Dept: PRIMARY CARE CLINIC | Facility: CLINIC | Age: 57
End: 2022-09-08
Payer: MEDICAID

## 2022-09-08 NOTE — TELEPHONE ENCOUNTER
----- Message from Hiren Russell sent at 9/8/2022 10:06 AM CDT -----  Contact: pt  Type: Requesting to speak with nurse        Who Called: PT  Regarding: would like a call back as soon as possible regarding tuberculosis treatments. States no one called his to schedule appointments . States that its a matter of life and death and would like some help.  Would the patient rather a call back or a response via [employer]? Call back  Best Call Back Number: [phone]  Additional Information:

## 2022-09-08 NOTE — TELEPHONE ENCOUNTER
Returned patients call and notified of all upcoming appointment dates and times. Notified patient that I would send appointments to home address in the mail at [phone] [place] [address] [place] [id number]

## 2022-09-08 NOTE — TELEPHONE ENCOUNTER
----- Message from Mirna Stoner sent at 9/8/2022  9:12 AM CDT -----  Type:  Needs Medical Advice    Who Called: self  Reason:was supposed to get a call for treatmntand no one ever callled  Would the patient rather a call back or a response via [employer]? call  Best Call Back Number: [phone]  Additional Information: one

## 2022-09-16 ENCOUNTER — OFFICE VISIT (OUTPATIENT)
Dept: PRIMARY CARE CLINIC | Facility: CLINIC | Age: 57
End: 2022-09-16
Payer: MEDICAID

## 2022-09-16 VITALS
WEIGHT: 147.19 LBS | BODY MASS INDEX: 19.96 KG/M2 | HEART RATE: 104 BPM | SYSTOLIC BLOOD PRESSURE: 126 MMHG | DIASTOLIC BLOOD PRESSURE: 79 MMHG | TEMPERATURE: 98 F | OXYGEN SATURATION: 98 %

## 2022-09-16 DIAGNOSIS — J43.8 OTHER EMPHYSEMA: ICD-10-CM

## 2022-09-16 DIAGNOSIS — F17.210 CIGARETTE NICOTINE DEPENDENCE WITHOUT COMPLICATION: ICD-10-CM

## 2022-09-16 DIAGNOSIS — A31.0 MYCOBACTERIUM AVIUM-INTRACELLULARE COMPLEX: Primary | ICD-10-CM

## 2022-09-16 PROBLEM — U07.1 COVID-19 VIRUS INFECTION: Status: RESOLVED | Noted: 2022-08-15 | Resolved: 2022-09-16

## 2022-09-16 PROCEDURE — 1160F PR REVIEW ALL MEDS BY PRESCRIBER/CLIN PHARMACIST DOCUMENTED: ICD-10-PCS | Mod: CPTII,,, | Performed by: INTERNAL MEDICINE

## 2022-09-16 PROCEDURE — 3074F SYST BP LT 130 MM HG: CPT | Mod: CPTII,,, | Performed by: INTERNAL MEDICINE

## 2022-09-16 PROCEDURE — 1160F RVW MEDS BY RX/DR IN RCRD: CPT | Mod: CPTII,,, | Performed by: INTERNAL MEDICINE

## 2022-09-16 PROCEDURE — 99999 PR PBB SHADOW E&M-EST. PATIENT-LVL III: CPT | Mod: PBBFAC,,, | Performed by: INTERNAL MEDICINE

## 2022-09-16 PROCEDURE — 3008F BODY MASS INDEX DOCD: CPT | Mod: CPTII,,, | Performed by: INTERNAL MEDICINE

## 2022-09-16 PROCEDURE — 1159F PR MEDICATION LIST DOCUMENTED IN MEDICAL RECORD: ICD-10-PCS | Mod: CPTII,,, | Performed by: INTERNAL MEDICINE

## 2022-09-16 PROCEDURE — 1111F PR DISCHARGE MEDS RECONCILED W/ CURRENT OUTPATIENT MED LIST: ICD-10-PCS | Mod: CPTII,,, | Performed by: INTERNAL MEDICINE

## 2022-09-16 PROCEDURE — 99212 PR OFFICE/OUTPT VISIT, EST, LEVL II, 10-19 MIN: ICD-10-PCS | Mod: S$PBB,,, | Performed by: INTERNAL MEDICINE

## 2022-09-16 PROCEDURE — 99212 OFFICE O/P EST SF 10 MIN: CPT | Mod: S$PBB,,, | Performed by: INTERNAL MEDICINE

## 2022-09-16 PROCEDURE — 99213 OFFICE O/P EST LOW 20 MIN: CPT | Mod: PBBFAC,PO | Performed by: INTERNAL MEDICINE

## 2022-09-16 PROCEDURE — 3008F PR BODY MASS INDEX (BMI) DOCUMENTED: ICD-10-PCS | Mod: CPTII,,, | Performed by: INTERNAL MEDICINE

## 2022-09-16 PROCEDURE — 3074F PR MOST RECENT SYSTOLIC BLOOD PRESSURE < 130 MM HG: ICD-10-PCS | Mod: CPTII,,, | Performed by: INTERNAL MEDICINE

## 2022-09-16 PROCEDURE — 3078F PR MOST RECENT DIASTOLIC BLOOD PRESSURE < 80 MM HG: ICD-10-PCS | Mod: CPTII,,, | Performed by: INTERNAL MEDICINE

## 2022-09-16 PROCEDURE — 1159F MED LIST DOCD IN RCRD: CPT | Mod: CPTII,,, | Performed by: INTERNAL MEDICINE

## 2022-09-16 PROCEDURE — 99999 PR PBB SHADOW E&M-EST. PATIENT-LVL III: ICD-10-PCS | Mod: PBBFAC,,, | Performed by: INTERNAL MEDICINE

## 2022-09-16 PROCEDURE — 1111F DSCHRG MED/CURRENT MED MERGE: CPT | Mod: CPTII,,, | Performed by: INTERNAL MEDICINE

## 2022-09-16 PROCEDURE — 3078F DIAST BP <80 MM HG: CPT | Mod: CPTII,,, | Performed by: INTERNAL MEDICINE

## 2022-09-16 RX ORDER — IBUPROFEN 200 MG
1 TABLET ORAL DAILY
Qty: 28 PATCH | Refills: 1 | Status: SHIPPED | OUTPATIENT
Start: 2022-09-16

## 2022-09-16 RX ORDER — IBUPROFEN 200 MG
1 TABLET ORAL DAILY
Qty: 20 PATCH | Refills: 1 | Status: SHIPPED | OUTPATIENT
Start: 2022-09-16 | End: 2022-09-16

## 2022-09-16 NOTE — PROGRESS NOTES
Subjective:       Patient ID: Hai Gonzales is a 57 y.o. male.    Chief Complaint: Hospital Follow Up    HPI:  Returns to clinic, unscheduled, requesting further counseling on his diagnosis.  Patient doesn't recall phone call discussion on 9/6/22 regarding culture results and plan of care.  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  Patient is long term heavy smoker, though now down to ~ 1/2 ppd.   Has not had PFT's or formal pulmonary evaluation- severity of chronic lung disease not yet known.   Awaiting Pulmonary evaluation 9/21/22 and ID evaluation 10/24/22.   Continues to have cough productive of white/yellow sputum- hemoptysis has resolved.  Has significant SOB with exertion.  No fever, but does have occasional night sweats.     Review of Systems   Constitutional:  Positive for chills and malaise/fatigue. Negative for fever.   HENT:  Negative for nosebleeds.    Respiratory:  Positive for cough, sputum production and shortness of breath. Negative for hemoptysis and wheezing.    Gastrointestinal:  Negative for abdominal pain, heartburn, nausea and vomiting.   Musculoskeletal:  Negative for falls.   Neurological:  Negative for focal weakness.   Psychiatric/Behavioral:  The patient is nervous/anxious.          Objective:      Vital Signs:  Vitals:    09/16/22 0841   BP: 126/79   Pulse: 104   Temp: 97.7 °F (36.5 °C)     Wt Readings from Last 3 Encounters:   09/16/22 0841 66.7 kg (147 lb 2.5 oz)   08/26/22 0841 63.5 kg (139 lb 15.9 oz)   08/16/22 1140 68 kg (150 lb)   08/15/22 1042 68 kg (150 lb)     Body mass index is 19.96 kg/m².   SpO2 Readings from Last 1 Encounters:   09/16/22 98%       Physical Exam  Constitutional:       General: He is not in acute distress.  HENT:      Head: Normocephalic.   Cardiovascular:      Rate and Rhythm: Tachycardia present.   Pulmonary:      Effort: Pulmonary effort is normal.      Breath sounds: No wheezing.   Abdominal:       Palpations: Abdomen is soft.   Musculoskeletal:         General: No swelling.   Skin:     General: Skin is warm.      Coloration: Skin is not jaundiced.   Neurological:      General: No focal deficit present.   Psychiatric:         Mood and Affect: Mood normal.           Assessment:       1. Mycobacterium avium-intracellulare complex    2. Cigarette nicotine dependence without complication        Plan:       Diagnoses and all orders for this visit:    Mycobacterium avium-intracellulare complex  - new diagnosis  - management per Pulmonary and ID teams    Other emphysema  - concern for lung disease given patient tobacco hx, but he has not had formal work up   - see Pulmonary team 9/21/22    Cigarette nicotine dependence without complication  -     Ambulatory referral/consult to Smoking Cessation Program; Future  -     nicotine (NICODERM CQ) 21 mg/24 hr; Place 1 patch onto the skin once daily.        I will [facility] again in [facility] 10/28/22.       Scheduled Follow-up :  Future Appointments   Date Time Provider Department Center   9/21/2022  1:00 PM Chayo Merida MD [facility] [address] [address]   10/24/2022  9:30 AM Son Henao MD [facility]  [facility]   10/28/2022 10:00 AM Lela Quintana MD [facility]       Post Visit Medication List:     Medication List            Accurate as of September 16, 2022 11:29 AM. If you have any questions, ask your nurse or doctor.                START taking these medications      nicotine 21 mg/24 hr  Commonly known as: NICODERM CQ  Place 1 patch onto the skin once daily.  Started by: Lela Quintana MD            CONTINUE taking these medications      LIDOcaine 5 %  Commonly known as: LIDODERM  Place 1 patch onto the skin once daily. Remove & Discard patch within 12 hours or as directed by MD     pulse oximeter device  Commonly known as: pulse oximeter  by Apply Externally route 2 (two) times a day. Use twice daily at 8 AM and 3 PM and record the value in  [place] as directed.               Where to Get Your Medications        These medications were sent to [facility] [address], [place] [address]      Hours: Mon-Fri, 8a-5:30p Phone: [phone]   nicotine 21 mg/24 hr

## 2022-09-21 ENCOUNTER — TELEPHONE (OUTPATIENT)
Dept: PULMONOLOGY | Facility: CLINIC | Age: 57
End: 2022-09-21

## 2022-09-21 ENCOUNTER — OFFICE VISIT (OUTPATIENT)
Dept: PULMONOLOGY | Facility: CLINIC | Age: 57
End: 2022-09-21
Payer: MEDICAID

## 2022-09-21 VITALS
SYSTOLIC BLOOD PRESSURE: 120 MMHG | BODY MASS INDEX: 20 KG/M2 | HEART RATE: 112 BPM | OXYGEN SATURATION: 96 % | HEIGHT: 72 IN | WEIGHT: 147.69 LBS | DIASTOLIC BLOOD PRESSURE: 72 MMHG

## 2022-09-21 DIAGNOSIS — J43.8 OTHER EMPHYSEMA: ICD-10-CM

## 2022-09-21 DIAGNOSIS — R93.89 ABNORMAL CT OF THE CHEST: ICD-10-CM

## 2022-09-21 DIAGNOSIS — A31.0 MYCOBACTERIUM AVIUM-INTRACELLULARE COMPLEX: Primary | ICD-10-CM

## 2022-09-21 DIAGNOSIS — F17.200 NICOTINE DEPENDENCE, UNCOMPLICATED, UNSPECIFIED NICOTINE PRODUCT TYPE: ICD-10-CM

## 2022-09-21 PROBLEM — R04.2 HEMOPTYSIS: Status: RESOLVED | Noted: 2022-08-15 | Resolved: 2022-09-21

## 2022-09-21 PROCEDURE — 99213 OFFICE O/P EST LOW 20 MIN: CPT | Mod: PBBFAC | Performed by: INTERNAL MEDICINE

## 2022-09-21 PROCEDURE — 3078F PR MOST RECENT DIASTOLIC BLOOD PRESSURE < 80 MM HG: ICD-10-PCS | Mod: CPTII,,, | Performed by: INTERNAL MEDICINE

## 2022-09-21 PROCEDURE — 99999 PR PBB SHADOW E&M-EST. PATIENT-LVL III: ICD-10-PCS | Mod: PBBFAC,,, | Performed by: INTERNAL MEDICINE

## 2022-09-21 PROCEDURE — 99213 PR OFFICE/OUTPT VISIT, EST, LEVL III, 20-29 MIN: ICD-10-PCS | Mod: S$PBB,,, | Performed by: INTERNAL MEDICINE

## 2022-09-21 PROCEDURE — 3078F DIAST BP <80 MM HG: CPT | Mod: CPTII,,, | Performed by: INTERNAL MEDICINE

## 2022-09-21 PROCEDURE — 1159F MED LIST DOCD IN RCRD: CPT | Mod: CPTII,,, | Performed by: INTERNAL MEDICINE

## 2022-09-21 PROCEDURE — 3008F BODY MASS INDEX DOCD: CPT | Mod: CPTII,,, | Performed by: INTERNAL MEDICINE

## 2022-09-21 PROCEDURE — 1159F PR MEDICATION LIST DOCUMENTED IN MEDICAL RECORD: ICD-10-PCS | Mod: CPTII,,, | Performed by: INTERNAL MEDICINE

## 2022-09-21 PROCEDURE — 3074F PR MOST RECENT SYSTOLIC BLOOD PRESSURE < 130 MM HG: ICD-10-PCS | Mod: CPTII,,, | Performed by: INTERNAL MEDICINE

## 2022-09-21 PROCEDURE — 99213 OFFICE O/P EST LOW 20 MIN: CPT | Mod: S$PBB,,, | Performed by: INTERNAL MEDICINE

## 2022-09-21 PROCEDURE — 3074F SYST BP LT 130 MM HG: CPT | Mod: CPTII,,, | Performed by: INTERNAL MEDICINE

## 2022-09-21 PROCEDURE — 99999 PR PBB SHADOW E&M-EST. PATIENT-LVL III: CPT | Mod: PBBFAC,,, | Performed by: INTERNAL MEDICINE

## 2022-09-21 PROCEDURE — 3008F PR BODY MASS INDEX (BMI) DOCUMENTED: ICD-10-PCS | Mod: CPTII,,, | Performed by: INTERNAL MEDICINE

## 2022-09-21 RX ORDER — IPRATROPIUM BROMIDE AND ALBUTEROL SULFATE 2.5; .5 MG/3ML; MG/3ML
3 SOLUTION RESPIRATORY (INHALATION) EVERY 6 HOURS PRN
Qty: 30 EACH | Refills: 5 | Status: SHIPPED | OUTPATIENT
Start: 2022-09-21 | End: 2023-07-24 | Stop reason: SDUPTHER

## 2022-09-21 RX ORDER — BUDESONIDE AND FORMOTEROL FUMARATE DIHYDRATE 160; 4.5 UG/1; UG/1
2 AEROSOL RESPIRATORY (INHALATION) EVERY 12 HOURS
Qty: 6 G | Refills: 6 | Status: SHIPPED | OUTPATIENT
Start: 2022-09-21 | End: 2024-03-19 | Stop reason: SDUPTHER

## 2022-09-21 RX ORDER — IBUPROFEN 600 MG/1
600 TABLET ORAL 3 TIMES DAILY PRN
COMMUNITY
Start: 2022-06-12 | End: 2022-12-08

## 2022-09-21 RX ORDER — MELOXICAM 15 MG/1
15 TABLET ORAL DAILY
COMMUNITY
Start: 2022-05-31 | End: 2022-12-08

## 2022-09-21 RX ORDER — ALBUTEROL SULFATE 90 UG/1
2 AEROSOL, METERED RESPIRATORY (INHALATION) EVERY 6 HOURS PRN
Qty: 18 G | Refills: 0 | Status: SHIPPED | OUTPATIENT
Start: 2022-09-21 | End: 2022-10-24 | Stop reason: SDUPTHER

## 2022-09-21 NOTE — ASSESSMENT & PLAN NOTE
Significant emphysema  - starting symbicort BID with albuterol PRN and nebulizer solution  - he is continuing to smoke but has cut back and is trying to quit.

## 2022-09-21 NOTE — ASSESSMENT & PLAN NOTE
Cavitary lung disease from MAC  - ID referral is in with appointment scheduled  - sensitivities pending on MAC culture

## 2022-09-21 NOTE — ASSESSMENT & PLAN NOTE
Large cavitary lesion- MAC grew in all respiratory cultures  - awaiting sensitivities and improving symptom control .

## 2022-09-21 NOTE — TELEPHONE ENCOUNTER
----- Message from Liliana Clements sent at 9/21/2022  4:48 PM CDT -----  Regarding: Medication  Contact: pt   Pt is requesting a call back regarding Medication for anxiety please call to discuss further .

## 2022-09-21 NOTE — ASSESSMENT & PLAN NOTE
Cut back to 1/2 ppd now and is trying to quit. Previously smoked up to 2 packs a day for 30+ years

## 2022-09-21 NOTE — TELEPHONE ENCOUNTER
Spoke with patient, informed him that I have received his message. I advised patient that Dr Merida do not prescribe Anxiety medication and that patient can contact his Primary Care physician. Patient verbalized that he understand.

## 2022-09-21 NOTE — PROGRESS NOTES
Subjective:     Reason for visit: MAC/Cavitary lung disease    Patient ID:  Hai Gonzales is a 57 y.o. male    Interval History:  Mr. Gonzales is a 58 yo with no significant previous medical history that presented to the hospital with hemoptysis, weight loss, night sweats, and difficulty breathing and was subsequently found to have a larger cavitary lung lesion with smaller satellite cavitation lesions. He was admitted and sputum samples obtained to rule out TB. His sputum samples all returned 3/3 + for MAC.   He has a significant smoking history and per his significant other that is with him today, he has always woken up in the morning with cough, sputum, and wheezing. She says his sputum is very thick in the morning. He says it is always thick in the morning and sometimes in the afternoon. He has noticed that if he lays flat and moves to the right he has more to cough up.      Additional Pulmonary History:  Childhood Illnesses: none known   Occupational Exposures:  construction work  Environmental Exposures:  none known  Tobacco/Smoking History:  50+ pack year smoking history   Travel History:  no recent travel history     Review of Systems   Constitutional:  Negative for chills, fever, malaise/fatigue and weight loss.   HENT:  Negative for congestion, hearing loss and nosebleeds.    Eyes:  Negative for blurred vision and double vision.   Respiratory:  Positive for cough, sputum production, shortness of breath and wheezing.    Cardiovascular:  Negative for chest pain, palpitations, claudication and leg swelling.   Genitourinary:  Negative for dysuria, frequency and hematuria.   Musculoskeletal:  Negative for myalgias.   Skin:  Negative for rash.   Neurological:  Negative for dizziness, tremors, focal weakness, seizures and weakness.   Endo/Heme/Allergies:  Negative for environmental allergies. Does not bruise/bleed easily.   Psychiatric/Behavioral:  Negative for depression. The patient is not nervous/anxious.        Objective:     Vitals:    09/21/22 1326   BP: 120/72   BP Location: Right arm   Patient Position: Sitting   BP Method: Medium (Manual)   Pulse: (!) 112   SpO2: 96%   Weight: 67 kg (147 lb 11.3 oz)   Height: 6' (1.829 m)         Physical Exam  Constitutional:       General: He is not in acute distress.     Appearance: He is not diaphoretic.   HENT:      Head: Normocephalic and atraumatic.      Right Ear: External ear normal.      Left Ear: External ear normal.      Nose: No congestion.   Eyes:      Conjunctiva/sclera: Conjunctivae normal.      Pupils: Pupils are equal, round, and reactive to light.   Neck:      Trachea: No tracheal deviation.   Cardiovascular:      Rate and Rhythm: Normal rate and regular rhythm.      Heart sounds: Normal heart sounds. No murmur heard.  Pulmonary:      Effort: Pulmonary effort is normal. No respiratory distress.      Breath sounds: No stridor. Wheezing and rales present.   Abdominal:      General: Bowel sounds are normal. There is no distension.      Palpations: Abdomen is soft.      Tenderness: There is no abdominal tenderness.   Musculoskeletal:         General: Normal range of motion.      Cervical back: Normal range of motion and neck supple.   Skin:     General: Skin is warm and dry.      Findings: No erythema.   Neurological:      Mental Status: He is alert and oriented to person, place, and time.      Gait: Gait is intact.   Psychiatric:         Mood and Affect: Mood and affect normal.         Cognition and Memory: Memory normal.         Judgment: Judgment normal.        Personal Diagnostic Review and Interpretation  CT reviewed       Pertinent Studies Reviewed and Interpreted:     Pulmonary Function Tests:   pending    Echocardiograms:   Results for orders placed during the hospital encounter of 08/15/22    Echo    Interpretation Summary  · The left ventricle is normal in size with normal systolic function.  · The estimated ejection fraction is 55%.  · Normal right  ventricular size with normal right ventricular systolic function.  · There is abnormal septal wall motion.  · Normal left ventricular diastolic function.  · The estimated PA systolic pressure is 27 mmHg.  · Normal central venous pressure (3 mmHg).        Assessment:     1. Mycobacterium avium-intracellulare complex  Spirometry with/without bronchodilator    Lung Volumes    DLCO-Carbon Monoxide Diffusing Capacity    Stress test, pulmonary    budesonide-formoterol 160-4.5 mcg (SYMBICORT) 160-4.5 mcg/actuation HFAA      2. Other emphysema  Spirometry with/without bronchodilator    Lung Volumes    DLCO-Carbon Monoxide Diffusing Capacity    Stress test, pulmonary    budesonide-formoterol 160-4.5 mcg (SYMBICORT) 160-4.5 mcg/actuation HFAA    albuterol (PROVENTIL/VENTOLIN HFA) 90 mcg/actuation inhaler    NEBULIZER KIT (SUPPLIES) FOR HOME USE    NEBULIZER FOR HOME USE    albuterol-ipratropium (DUO-NEB) 2.5 mg-0.5 mg/3 mL nebulizer solution      3. Abnormal CT of the chest        4. Nicotine dependence, uncomplicated, unspecified nicotine product type            Current Outpatient Medications   Medication Instructions    albuterol (PROVENTIL/VENTOLIN HFA) 90 mcg/actuation inhaler 2 puffs, Inhalation, Every 6 hours PRN, Rescue    albuterol-ipratropium (DUO-NEB) 2.5 mg-0.5 mg/3 mL nebulizer solution 3 mLs, Nebulization, Every 6 hours PRN, Rescue    budesonide-formoterol 160-4.5 mcg (SYMBICORT) 160-4.5 mcg/actuation HFAA 2 puffs, Inhalation, Every 12 hours, Controller    ibuprofen (ADVIL,MOTRIN) 600 mg, Oral, 3 times daily PRN    LIDOcaine (LIDODERM) 5 % 1 patch, Transdermal, Daily, Remove & Discard patch within 12 hours or as directed by MD    meloxicam (MOBIC) 15 mg, Oral, Daily    nicotine (NICODERM CQ) 21 mg/24 hr 1 patch, Transdermal, Daily    pulse oximeter (PULSE OXIMETER) device Apply Externally, 2 times daily, Use twice daily at 8 AM and 3 PM and record the value in MyChart as directed.      Hai Gonzales had no  medications administered during this visit.     Orders Placed This Encounter   Procedures    NEBULIZER KIT (SUPPLIES) FOR HOME USE     Order Specific Question:   Height:     Answer:   6' (1.829 m)     Order Specific Question:   Weight:     Answer:   67 kg (147 lb 11.3 oz)     Order Specific Question:   Length of need (1-99 months):     Answer:   99     Order Specific Question:   Mask or Mouthpiece?     Answer:   Mouthpiece    NEBULIZER FOR HOME USE     Order Specific Question:   Height:     Answer:   6' (1.829 m)     Order Specific Question:   Weight:     Answer:   67 kg (147 lb 11.3 oz)     Order Specific Question:   Length of need (1-99 months):     Answer:   99    Spirometry with/without bronchodilator     Standing Status:   Future     Standing Expiration Date:   9/21/2023     Order Specific Question:   Release to patient     Answer:   Immediate    Lung Volumes     Standing Status:   Future     Standing Expiration Date:   9/21/2023     Order Specific Question:   Release to patient     Answer:   Immediate    DLCO-Carbon Monoxide Diffusing Capacity     Standing Status:   Future     Standing Expiration Date:   9/21/2023     Order Specific Question:   Release to patient     Answer:   Immediate    Stress test, pulmonary     Standing Status:   Future     Standing Expiration Date:   9/21/2023     Order Specific Question:   Reason for study     Answer:   Functional status     Order Specific Question:   Release to patient     Answer:   Immediate      Plan:       Problem List Items Addressed This Visit          Pulmonary    Other emphysema    Current Assessment & Plan     Significant emphysema  - starting symbicort BID with albuterol PRN and nebulizer solution  - he is continuing to smoke but has cut back and is trying to quit.            Relevant Medications    budesonide-formoterol 160-4.5 mcg (SYMBICORT) 160-4.5 mcg/actuation HFAA    albuterol (PROVENTIL/VENTOLIN HFA) 90 mcg/actuation inhaler    albuterol-ipratropium  (DUO-NEB) 2.5 mg-0.5 mg/3 mL nebulizer solution    Other Relevant Orders    Spirometry with/without bronchodilator    Lung Volumes    DLCO-Carbon Monoxide Diffusing Capacity    Stress test, pulmonary    NEBULIZER KIT (SUPPLIES) FOR HOME USE    NEBULIZER FOR HOME USE       ID    Mycobacterium avium-intracellulare complex - Primary    Current Assessment & Plan     Cavitary lung disease from MAC  - ID referral is in with appointment scheduled  - sensitivities pending on MAC culture           Relevant Medications    budesonide-formoterol 160-4.5 mcg (SYMBICORT) 160-4.5 mcg/actuation HFAA    Other Relevant Orders    Spirometry with/without bronchodilator    Lung Volumes    DLCO-Carbon Monoxide Diffusing Capacity    Stress test, pulmonary       Other    Abnormal CT of the chest    Current Assessment & Plan     Large cavitary lesion- MAC grew in all respiratory cultures  - awaiting sensitivities and improving symptom control .          Nicotine dependence    Current Assessment & Plan     Cut back to 1/2 ppd now and is trying to quit. Previously smoked up to 2 packs a day for 30+ years            Chayo Merida M.D.  Pulmonary/Critical Care

## 2022-09-27 ENCOUNTER — HOSPITAL ENCOUNTER (OUTPATIENT)
Dept: PULMONOLOGY | Facility: HOSPITAL | Age: 57
Discharge: HOME | End: 2022-09-27
Attending: INTERNAL MEDICINE
Payer: MEDICAID

## 2022-09-27 VITALS — RESPIRATION RATE: 18 BRPM | HEART RATE: 106 BPM

## 2022-09-27 DIAGNOSIS — A31.0 MYCOBACTERIUM AVIUM-INTRACELLULARE COMPLEX: ICD-10-CM

## 2022-09-27 DIAGNOSIS — J43.8 OTHER EMPHYSEMA: ICD-10-CM

## 2022-09-27 PROCEDURE — 94010 BREATHING CAPACITY TEST: CPT

## 2022-09-27 PROCEDURE — 94640 AIRWAY INHALATION TREATMENT: CPT

## 2022-09-27 PROCEDURE — 94727 GAS DIL/WSHOT DETER LNG VOL: CPT

## 2022-09-27 PROCEDURE — 25000242 PHARM REV CODE 250 ALT 637 W/ HCPCS: Performed by: INTERNAL MEDICINE

## 2022-09-27 PROCEDURE — 94618 PULMONARY STRESS TESTING: CPT

## 2022-09-27 RX ORDER — ALBUTEROL SULFATE 2.5 MG/.5ML
2.5 SOLUTION RESPIRATORY (INHALATION) ONCE
Status: COMPLETED | OUTPATIENT
Start: 2022-09-27 | End: 2022-09-27

## 2022-09-27 RX ADMIN — ALBUTEROL SULFATE 2.5 MG: 2.5 SOLUTION RESPIRATORY (INHALATION) at 08:09

## 2022-09-27 NOTE — PROCEDURES
Hai Gonzales is a 57 y.o.   male patient, who presents for a 6 minute walk test ordered by  .  The diagnosis is  emphysema.  The patient's BMI is  20 kg/m2.        Test Results:    The test was  completed.  The patient stopped  0 times for a total of 0 seconds.  The total time walked was 360 seconds.  During walking, the patient reported:   lightheadedness and dyspnea.    09/27/2022---------Distance:   (293m )     O2 Sat % Supplemental Oxygen Heart Rate Blood Pressure Nasir Scale   Pre-exercise  (Resting)  97 ra 108 142/78 3   During Exercise 93 ra 118 na 8   Post-exercise  (Recovery) 97 ra 102 137/71 4     Recovery Time:  60seconds    Performing nurse/tech: Bernard siddiqui

## 2022-10-20 LAB — ACID FAST SUSCEPTIBILITY, SLOW GROWER: ABNORMAL

## 2022-10-21 LAB
BRPFT: ABNORMAL
DLCO ADJ PRE: 15.86 ML/(MIN*MMHG) (ref 24.54–38.4)
DLCO SINGLE BREATH LLN: 24.54
DLCO SINGLE BREATH PRE REF: 47.1 %
DLCO SINGLE BREATH REF: 31.47
DLCOC SBVA LLN: 3.05
DLCOC SBVA PRE REF: 88 %
DLCOC SBVA REF: 4.17
DLCOC SINGLE BREATH LLN: 24.54
DLCOC SINGLE BREATH PRE REF: 50.4 %
DLCOC SINGLE BREATH REF: 31.47
DLCOVA LLN: 3.05
DLCOVA PRE REF: 82.3 %
DLCOVA PRE: 3.44 ML/(MIN*MMHG*L) (ref 3.05–5.29)
DLCOVA REF: 4.17
DLVAADJ PRE: 3.67 ML/(MIN*MMHG*L) (ref 3.05–5.29)
FEF 25 75 CHG: -3.3 %
FEF 25 75 LLN: 1.67
FEF 25 75 POST REF: 16.8 %
FEF 25 75 PRE REF: 17.4 %
FEF 25 75 REF: 3.3
FET100 CHG: 14.9 %
FEV1 CHG: -2.5 %
FEV1 FVC CHG: -8.3 %
FEV1 FVC LLN: 66
FEV1 FVC POST REF: 59 %
FEV1 FVC PRE REF: 64.3 %
FEV1 FVC REF: 78
FEV1 LLN: 2.99
FEV1 POST REF: 31.3 %
FEV1 PRE REF: 32.1 %
FEV1 REF: 3.92
FVC CHG: 6.3 %
FVC LLN: 3.9
FVC POST REF: 52.9 %
FVC PRE REF: 49.7 %
FVC REF: 5.08
IVC PRE: 2.46 L (ref 3.9–6.26)
IVC SINGLE BREATH LLN: 3.9
IVC SINGLE BREATH PRE REF: 48.4 %
IVC SINGLE BREATH REF: 5.08
PEF CHG: -22.4 %
PEF LLN: 7.45
PEF POST REF: 16.2 %
PEF PRE REF: 20.9 %
PEF REF: 9.9
POST FEF 25 75: 0.56 L/S (ref 1.67–4.93)
POST FET 100: 7.09 SEC
POST FEV1 FVC: 45.72 % (ref 65.9–89.16)
POST FEV1: 1.23 L (ref 2.99–4.86)
POST FVC: 2.69 L (ref 3.9–6.26)
POST PEF: 1.6 L/S (ref 7.45–12.35)
PRE DLCO: 14.84 ML/(MIN*MMHG) (ref 24.54–38.4)
PRE FEF 25 75: 0.57 L/S (ref 1.67–4.93)
PRE FET 100: 6.17 SEC
PRE FEV1 FVC: 49.88 % (ref 65.9–89.16)
PRE FEV1: 1.26 L (ref 2.99–4.86)
PRE FVC: 2.53 L (ref 3.9–6.26)
PRE PEF: 2.07 L/S (ref 7.45–12.35)
VA PRE: 4.32 L (ref 7.39–7.39)
VA SINGLE BREATH LLN: 7.39
VA SINGLE BREATH PRE REF: 58.4 %
VA SINGLE BREATH REF: 7.39

## 2022-10-24 ENCOUNTER — OFFICE VISIT (OUTPATIENT)
Dept: INFECTIOUS DISEASES | Facility: CLINIC | Age: 57
End: 2022-10-24
Payer: MEDICAID

## 2022-10-24 VITALS
HEART RATE: 106 BPM | BODY MASS INDEX: 21.44 KG/M2 | HEIGHT: 72 IN | RESPIRATION RATE: 18 BRPM | WEIGHT: 158.31 LBS | SYSTOLIC BLOOD PRESSURE: 145 MMHG | DIASTOLIC BLOOD PRESSURE: 79 MMHG | TEMPERATURE: 98 F

## 2022-10-24 DIAGNOSIS — A31.0 MYCOBACTERIUM AVIUM-INTRACELLULARE INFECTION: ICD-10-CM

## 2022-10-24 DIAGNOSIS — J43.8 OTHER EMPHYSEMA: ICD-10-CM

## 2022-10-24 PROCEDURE — 3078F DIAST BP <80 MM HG: CPT | Mod: CPTII,,, | Performed by: INTERNAL MEDICINE

## 2022-10-24 PROCEDURE — 3077F SYST BP >= 140 MM HG: CPT | Mod: CPTII,,, | Performed by: INTERNAL MEDICINE

## 2022-10-24 PROCEDURE — 99999 PR PBB SHADOW E&M-EST. PATIENT-LVL III: CPT | Mod: PBBFAC,,, | Performed by: INTERNAL MEDICINE

## 2022-10-24 PROCEDURE — 3077F PR MOST RECENT SYSTOLIC BLOOD PRESSURE >= 140 MM HG: ICD-10-PCS | Mod: CPTII,,, | Performed by: INTERNAL MEDICINE

## 2022-10-24 PROCEDURE — 99213 OFFICE O/P EST LOW 20 MIN: CPT | Mod: PBBFAC,PN | Performed by: INTERNAL MEDICINE

## 2022-10-24 PROCEDURE — 3078F PR MOST RECENT DIASTOLIC BLOOD PRESSURE < 80 MM HG: ICD-10-PCS | Mod: CPTII,,, | Performed by: INTERNAL MEDICINE

## 2022-10-24 PROCEDURE — 1159F PR MEDICATION LIST DOCUMENTED IN MEDICAL RECORD: ICD-10-PCS | Mod: CPTII,,, | Performed by: INTERNAL MEDICINE

## 2022-10-24 PROCEDURE — 99213 PR OFFICE/OUTPT VISIT, EST, LEVL III, 20-29 MIN: ICD-10-PCS | Mod: S$PBB,,, | Performed by: INTERNAL MEDICINE

## 2022-10-24 PROCEDURE — 99213 OFFICE O/P EST LOW 20 MIN: CPT | Mod: S$PBB,,, | Performed by: INTERNAL MEDICINE

## 2022-10-24 PROCEDURE — 99999 PR PBB SHADOW E&M-EST. PATIENT-LVL III: ICD-10-PCS | Mod: PBBFAC,,, | Performed by: INTERNAL MEDICINE

## 2022-10-24 PROCEDURE — 1159F MED LIST DOCD IN RCRD: CPT | Mod: CPTII,,, | Performed by: INTERNAL MEDICINE

## 2022-10-24 RX ORDER — RIFAMPIN 300 MG/1
600 CAPSULE ORAL DAILY
Qty: 60 CAPSULE | Refills: 2 | Status: SHIPPED | OUTPATIENT
Start: 2022-10-24 | End: 2023-01-23 | Stop reason: SDUPTHER

## 2022-10-24 RX ORDER — AZITHROMYCIN 500 MG/1
500 TABLET, FILM COATED ORAL DAILY
Qty: 30 TABLET | Refills: 2 | Status: SHIPPED | OUTPATIENT
Start: 2022-10-24 | End: 2023-01-23 | Stop reason: SDUPTHER

## 2022-10-24 RX ORDER — ETHAMBUTOL HYDROCHLORIDE 400 MG/1
800 TABLET, FILM COATED ORAL DAILY
Qty: 60 TABLET | Refills: 2 | Status: SHIPPED | OUTPATIENT
Start: 2022-10-24 | End: 2023-01-23 | Stop reason: SDUPTHER

## 2022-10-24 RX ORDER — ALBUTEROL SULFATE 90 UG/1
2 AEROSOL, METERED RESPIRATORY (INHALATION) EVERY 6 HOURS PRN
Qty: 18 G | Refills: 0 | Status: SHIPPED | OUTPATIENT
Start: 2022-10-24 | End: 2022-11-28 | Stop reason: SDUPTHER

## 2022-10-24 NOTE — PROGRESS NOTES
Infectious Disease Clinic  Clinic note    Patient Name: Hai Gonzales  YOB: 1965    PRESENTING HISTORY       History of Present Illness:  Mr. Hai Gonzales is a 58 y/o male with tobacco abuse and no other significant history recently presented to [facility] ED with complaints of hemoptysis that started at 0430 day of admission. Endorsed night sweats, chills, intermittent subjective fever, and decreased appetite over the past two months. + intermittent non-bloody diarrheal episodes over the past week.~20lbs weight loss over the past month. He reports smoking history of 1.5 packs daily x 40 years (60 PY). CTA with worsening LLL and RUL bronchiectasis, bronchial thickening, and coalescent cavitation. Cxs were positive for MAC. He was meant to follow up in Non-TB mycobacterium clinic with Dr. Jolley but he states he can not get downtown for appts because of transportation.     Review of Systems:  Negative except as above    PAST HISTORY:   History reviewed. No pertinent past medical history.    History reviewed. No pertinent surgical history.    History reviewed. No pertinent family history.    Social History     Socioeconomic History    Marital status: Significant Other   Tobacco Use    Smoking status: Every Day    Smokeless tobacco: Never       MEDICATIONS & ALLERGIES:     Current Outpatient Medications on File Prior to Visit   Medication Sig    albuterol (PROVENTIL/VENTOLIN HFA) 90 mcg/actuation inhaler Inhale 2 puffs into the lungs every 6 (six) hours as needed for Wheezing. Rescue    albuterol-ipratropium (DUO-NEB) 2.5 mg-0.5 mg/3 mL nebulizer solution Take 3 mLs by nebulization every 6 (six) hours as needed for Wheezing. Rescue    budesonide-formoterol 160-4.5 mcg (SYMBICORT) 160-4.5 mcg/actuation HFAA Inhale 2 puffs into the lungs every 12 (twelve) hours. Controller    ibuprofen (ADVIL,MOTRIN) 600 MG tablet Take 600 mg by mouth 3 (three) times daily as needed.    LIDOcaine (LIDODERM) 5 % Place 1 patch onto  the skin once daily. Remove & Discard patch within 12 hours or as directed by MD    meloxicam (MOBIC) 15 MG tablet Take 15 mg by mouth once daily.    nicotine (NICODERM CQ) 21 mg/24 hr Place 1 patch onto the skin once daily.    pulse oximeter (PULSE OXIMETER) device by Apply Externally route 2 (two) times a day. Use twice daily at 8 AM and 3 PM and record the value in [facility] as directed. (Patient not taking: Reported on 10/24/2022)     No current facility-administered medications on file prior to visit.       Review of patient's allergies indicates:  No Known Allergies    OBJECTIVE:   Vital Signs:  Vitals:    10/24/22 0930   BP: (!) 145/79   Pulse: 106   Resp: 18   Temp: 98.4 °F (36.9 °C)   TempSrc: Oral   Weight: 71.8 kg (158 lb 4.6 oz)   Height: 6' (1.829 m)       No results found for this or any previous visit (from the past 24 hour(s)).      Physical Exam:   General:  Well developed, well nourished, no acute distress  HEENT:  Normocephalic, atraumatic, EOMI, clear sclera, throat clear without erythema or exudates  CVS:  RRR, S1 and S2 normal, no murmurs, rubs, gallops  Resp:  + rales, rhonchi  GI:  Abdomen soft, non-tender, non-distended, normoactive bowel sounds, no masses  MSK:  No muscle atrophy, peripheral edema, full range of motion  Skin:  No rashes, ulcers, erythema  Neuro:  CNII-XII grossly intact  Psych:  Alert and oriented to person, place, and time    ASSESSMENT:     1. Mycobacterium avium-intracellulare infection  56 y/o male with tobacco abuse and no other significant history recently presented to [facility] ED with complaints of hemoptysis and was found to have cavitary MAC disease       PLAN:   -negative HIV test this year  -based on sensitivities will start him on azithromycin 500mg daily, ethambutol 800mg daily, and rifampin 600mg daily  -will need repeat cxs after starting medications  -discussed side effects  -RTC 3 months

## 2022-10-31 LAB
ACID FAST MOD KINY STN SPEC: ABNORMAL
MYCOBACTERIUM SPEC QL CULT: ABNORMAL

## 2022-11-07 ENCOUNTER — DOCUMENTATION ONLY (OUTPATIENT)
Dept: PRIMARY CARE CLINIC | Facility: CLINIC | Age: 57
End: 2022-11-07
Payer: MEDICAID

## 2022-11-07 ENCOUNTER — OFFICE VISIT (OUTPATIENT)
Dept: PRIMARY CARE CLINIC | Facility: CLINIC | Age: 57
End: 2022-11-07
Payer: MEDICAID

## 2022-11-07 DIAGNOSIS — J43.8 OTHER EMPHYSEMA: Primary | ICD-10-CM

## 2022-11-07 DIAGNOSIS — F17.200 NICOTINE DEPENDENCE, UNCOMPLICATED, UNSPECIFIED NICOTINE PRODUCT TYPE: ICD-10-CM

## 2022-11-07 DIAGNOSIS — A31.0 MYCOBACTERIUM AVIUM-INTRACELLULARE COMPLEX: ICD-10-CM

## 2022-11-07 PROCEDURE — 99999 PR PBB SHADOW E&M-EST. PATIENT-LVL III: CPT | Mod: PBBFAC,,, | Performed by: INTERNAL MEDICINE

## 2022-11-07 PROCEDURE — 3075F SYST BP GE 130 - 139MM HG: CPT | Mod: CPTII,,, | Performed by: INTERNAL MEDICINE

## 2022-11-07 PROCEDURE — 99213 OFFICE O/P EST LOW 20 MIN: CPT | Mod: PBBFAC,PO | Performed by: INTERNAL MEDICINE

## 2022-11-07 PROCEDURE — 99999 PR PBB SHADOW E&M-EST. PATIENT-LVL III: ICD-10-PCS | Mod: PBBFAC,,, | Performed by: INTERNAL MEDICINE

## 2022-11-07 PROCEDURE — 3078F PR MOST RECENT DIASTOLIC BLOOD PRESSURE < 80 MM HG: ICD-10-PCS | Mod: CPTII,,, | Performed by: INTERNAL MEDICINE

## 2022-11-07 PROCEDURE — 3075F PR MOST RECENT SYSTOLIC BLOOD PRESS GE 130-139MM HG: ICD-10-PCS | Mod: CPTII,,, | Performed by: INTERNAL MEDICINE

## 2022-11-07 PROCEDURE — 1159F PR MEDICATION LIST DOCUMENTED IN MEDICAL RECORD: ICD-10-PCS | Mod: CPTII,,, | Performed by: INTERNAL MEDICINE

## 2022-11-07 PROCEDURE — 1159F MED LIST DOCD IN RCRD: CPT | Mod: CPTII,,, | Performed by: INTERNAL MEDICINE

## 2022-11-07 PROCEDURE — 3008F BODY MASS INDEX DOCD: CPT | Mod: CPTII,,, | Performed by: INTERNAL MEDICINE

## 2022-11-07 PROCEDURE — 3008F PR BODY MASS INDEX (BMI) DOCUMENTED: ICD-10-PCS | Mod: CPTII,,, | Performed by: INTERNAL MEDICINE

## 2022-11-07 PROCEDURE — 99214 OFFICE O/P EST MOD 30 MIN: CPT | Mod: S$PBB,,, | Performed by: INTERNAL MEDICINE

## 2022-11-07 PROCEDURE — 99214 PR OFFICE/OUTPT VISIT, EST, LEVL IV, 30-39 MIN: ICD-10-PCS | Mod: S$PBB,,, | Performed by: INTERNAL MEDICINE

## 2022-11-07 PROCEDURE — 3078F DIAST BP <80 MM HG: CPT | Mod: CPTII,,, | Performed by: INTERNAL MEDICINE

## 2022-11-07 NOTE — PROGRESS NOTES
Patients home O2 sat on room air while at rest is 93%  Patients O2 Sat on room air while exercising is 82%  Patients O2 Sat while exercising on O2 is 95% via 2 L NC

## 2022-11-07 NOTE — PROGRESS NOTES
Subjective:       Patient ID: Hai Gonzales is a 57 y.o. male.    Chief Complaint: Hospital Follow Up    HPI:  Returns to clinic for management of Mycobacterium avium- intracellulare complex in setting of chronic lung disease.  Seen by Pulmonary team 9/21/22- Symbicort and Duonebs prescribed.  Seen by ID team 10/24/22- Azithromycin/Ethambutol/Rifampin prescribed.     Patient accompanied today by family.  Ambulatory and independent with ADL's.  Reports compliance with all medication.  Reports dyspnea at rest.   Monitored walk test performed in office.   Desaturated to 82% on room air with ambulation; recovered to 95% on 2 L NC while ambulating.         Review of Systems   Constitutional:  Positive for malaise/fatigue and weight loss. Negative for chills and fever.   HENT:  Negative for sinus pain.    Respiratory:  Positive for cough, sputum production, shortness of breath and wheezing. Negative for hemoptysis.    Cardiovascular:  Negative for chest pain and leg swelling.   Gastrointestinal:  Negative for abdominal pain, blood in stool, diarrhea, heartburn, nausea and vomiting.   Genitourinary:  Negative for dysuria.        + orange colored urine    Musculoskeletal:  Negative for joint pain and myalgias.   Neurological:  Negative for focal weakness.   Psychiatric/Behavioral:  The patient is not nervous/anxious.          Objective:      Vital Signs:  Vitals:    11/07/22 1502   BP: 130/74   Pulse: 98   Temp: 97.8 °F (36.6 °C)     Wt Readings from Last 3 Encounters:   11/07/22 1502 72.3 kg (159 lb 4.5 oz)   10/24/22 0930 71.8 kg (158 lb 4.6 oz)   09/21/22 1326 67 kg (147 lb 11.3 oz)     Body mass index is 21.6 kg/m².   SpO2 Readings from Last 1 Encounters:   11/07/22 (!) 93%       Physical Exam  Constitutional:       Comments: + cachetic appearing     HENT:      Head: Normocephalic.      Mouth/Throat:      Mouth: Mucous membranes are moist.   Cardiovascular:      Rate and Rhythm: Normal rate.   Pulmonary:      Breath  sounds: Wheezing present.   Abdominal:      General: Bowel sounds are normal. There is no distension.      Palpations: Abdomen is soft.   Musculoskeletal:         General: No swelling.      Cervical back: Neck supple.   Skin:     Coloration: Skin is not jaundiced.   Neurological:      Mental Status: He is oriented to person, place, and time.   Psychiatric:         Mood and Affect: Mood normal.           Assessment:       1. Other emphysema    2. Mycobacterium avium-intracellulare complex    3. Nicotine dependence, uncomplicated, unspecified nicotine product type        Plan:       Diagnoses and all orders for this visit:    Other emphysema  - managed by Pulmonary team  - on Symbicort ; has Duonebs and Alb INH for prn use  - order placed today for home oxygen  -     OXYGEN FOR HOME USE    Mycobacterium avium-intracellulare complex  - mangement per ID team  - continue Azithromycin/ethambutol/rifampin as directed  - return to ID 1/23/22    Nicotine dependence, uncomplicated, unspecified nicotine product type  - down to 1/2 ppd  - no longer using nicotine patches- says they didn't help  - missed tobacco cessation appt on 10/3/22 and he has not called to re-schedule  - patient has contact information for tobacco cessation program and is encouraged to call  - counseling and education provided  - patient educated on not smoking near his home oxygen/fire hazard       Patient will [facility] released [facility].  He will see Dr Mary Harris, 12/8/22 to establish new care.   Home oxygen order to [facility].     Scheduled Follow-up :  Future Appointments   Date Time Provider Department Center   12/8/2022  4:00 PM Mary Harris MD [facility]   1/23/2023 10:00 AM Son Henao MD [facility]       Post Visit Medication List:     Medication List            Accurate as of November 7, 2022 11:59 PM. If you have any questions, ask your nurse or doctor.                CONTINUE taking these medications       albuterol 90 mcg/actuation inhaler  Commonly known as: PROVENTIL/VENTOLIN HFA  Inhale 2 puffs into the lungs every 6 (six) hours as needed for Wheezing. Rescue     albuterol-ipratropium 2.5 mg-0.5 mg/3 mL nebulizer solution  Commonly known as: DUO-NEB  Take 3 mLs by nebulization every 6 (six) hours as needed for Wheezing. Rescue     azithromycin 500 MG tablet  Commonly known as: ZITHROMAX  Take 1 tablet (500 mg total) by mouth once daily.     budesonide-formoterol 160-4.5 mcg 160-4.5 mcg/actuation Hfaa  Commonly known as: SYMBICORT  Inhale 2 puffs into the lungs every 12 (twelve) hours. Controller     ethambutoL 400 MG Tab  Commonly known as: MYAMBUTOL  Take 2 tablets (800 mg total) by mouth once daily.     ibuprofen 600 MG tablet  Commonly known as: ADVIL,MOTRIN     LIDOcaine 5 %  Commonly known as: LIDODERM  Place 1 patch onto the skin once daily. Remove & Discard patch within 12 hours or as directed by MD     meloxicam 15 MG tablet  Commonly known as: MOBIC     nicotine 21 mg/24 hr  Commonly known as: NICODERM CQ  Place 1 patch onto the skin once daily.     pulse oximeter device  Commonly known as: pulse oximeter  by Apply Externally route 2 (two) times a day. Use twice daily at 8 AM and 3 PM and record the value in MyChart as directed.     rifAMpin 300 MG capsule  Commonly known as: RIFADIN  Take 2 capsules (600 mg total) by mouth once daily.

## 2022-11-08 VITALS
OXYGEN SATURATION: 93 % | WEIGHT: 159.31 LBS | BODY MASS INDEX: 21.6 KG/M2 | DIASTOLIC BLOOD PRESSURE: 74 MMHG | TEMPERATURE: 98 F | HEART RATE: 98 BPM | SYSTOLIC BLOOD PRESSURE: 130 MMHG

## 2022-11-11 ENCOUNTER — DOCUMENTATION ONLY (OUTPATIENT)
Dept: PRIMARY CARE CLINIC | Facility: CLINIC | Age: 57
End: 2022-11-11
Payer: MEDICAID

## 2022-11-11 NOTE — PROGRESS NOTES
Per kristen with [facility], pt approved for home O2. Per Kristen, Home set up with home O2 concentrator and tanks to be delivered today. If not today, next availability for delivery would be on Monday 11/14. Pt aware of scheduled home O2 delivery for today; pt requested delivery after 2:30pm. Nurse informed Ethel with [facility] of delivery request time.

## 2022-12-08 ENCOUNTER — OFFICE VISIT (OUTPATIENT)
Dept: FAMILY MEDICINE | Facility: HOSPITAL | Age: 57
End: 2022-12-08
Payer: MEDICAID

## 2022-12-08 VITALS
DIASTOLIC BLOOD PRESSURE: 74 MMHG | HEIGHT: 72 IN | WEIGHT: 160.25 LBS | HEART RATE: 94 BPM | SYSTOLIC BLOOD PRESSURE: 130 MMHG | BODY MASS INDEX: 21.71 KG/M2

## 2022-12-08 DIAGNOSIS — Z72.0 TOBACCO ABUSE: ICD-10-CM

## 2022-12-08 DIAGNOSIS — M25.50 ARTHRALGIA, UNSPECIFIED JOINT: Primary | ICD-10-CM

## 2022-12-08 DIAGNOSIS — M79.10 MYALGIA: ICD-10-CM

## 2022-12-08 DIAGNOSIS — A31.0 MYCOBACTERIUM AVIUM-INTRACELLULARE COMPLEX: ICD-10-CM

## 2022-12-08 DIAGNOSIS — J43.8 OTHER EMPHYSEMA: ICD-10-CM

## 2022-12-08 PROCEDURE — 99214 OFFICE O/P EST MOD 30 MIN: CPT

## 2022-12-08 RX ORDER — MELOXICAM 15 MG/1
15 TABLET ORAL DAILY
Qty: 30 TABLET | Refills: 0 | Status: SHIPPED | OUTPATIENT
Start: 2022-12-08 | End: 2023-01-07

## 2022-12-08 RX ORDER — DICLOFENAC SODIUM 10 MG/G
2 GEL TOPICAL 4 TIMES DAILY
Qty: 1 EACH | Refills: 3 | Status: SHIPPED | OUTPATIENT
Start: 2022-12-08 | End: 2023-01-07

## 2022-12-08 RX ORDER — LIDOCAINE 50 MG/G
1 PATCH TOPICAL DAILY
Qty: 30 PATCH | Refills: 7 | Status: SHIPPED | OUTPATIENT
Start: 2022-12-08 | End: 2023-01-07

## 2022-12-08 NOTE — PROGRESS NOTES
History & Physical  [facility]      SUBJECTIVE:     History of Present Illness:  Patient is a 57 y.o. male with of tobacco abuse, MAC followed by ID, and obstructive lung disease/emphysema presents to clinic to establish care.     Hospital follow up after hemoptysis, also with COVID+- 08/2022 for MAC, taking rifampin, ethambutol, and azithromycin - sees Dr. Henao again 1/23/2023    PFTs done 09/2022 showing severe obstruction, sees Pulmonology Dr. Merida; uses albuterol, Symbicort, and Duonebs. Home oxygen eval, uses 2L O2 at baseline.    C/o joint pains since hospitalization. Hands, knees, shoulders, ankles and back. Worse in morning. Tried Tylenol, Ibuprofen, and Aleve has not helped. Constant aching pain. Affects daily activities, feels more stiff. Endorses occasional erythema and warmth. However, not currently. Denies any family hx of rheumatoid arthritis.     Immunizations - not up to date, has not gotten flu or COVID   Last colonoscopy- has never had before, but does not want to get today; re-address next visit  Last HgbA1C- never had, normal glucose in hospital  Last STI testing- up to date  Last lipid panel- deferred    Smoker- 1/2 pack every 2 days. >30 years since 17 years old. AAA screening >56yo smoker, will order today. FmHx unsure  EtOH use- drinks beer 1x/week  Drug use- denies any use  Sexual history- one partner, female  Social history - not currently working    Review of patient's allergies indicates:  No Known Allergies    No past medical history on file.  No past surgical history on file.  No family history on file.  Social History     Tobacco Use    Smoking status: Every Day     Packs/day: 0.25     Types: Cigarettes    Smokeless tobacco: Never        OBJECTIVE:     Vital Signs (Most Recent)  Vitals:    12/08/22 1538   BP: 130/74   Pulse: 94   Weight: 72.7 kg (160 lb 4.4 oz)   Height: 6' (1.829 m)     BMI: 21.74      Physical Exam  Vitals reviewed.   Constitutional:       General: He is  not in acute distress.     Appearance: Normal appearance. He is not toxic-appearing.   Cardiovascular:      Rate and Rhythm: Normal rate and regular rhythm.      Pulses: Normal pulses.      Heart sounds: Normal heart sounds.   Pulmonary:      Effort: Pulmonary effort is normal.      Comments: Decreased breath sounds LLL   Musculoskeletal:         General: No swelling, tenderness, deformity or signs of injury. Normal range of motion.      Right lower leg: No edema.      Left lower leg: No edema.   Skin:     General: Skin is warm.   Neurological:      Mental Status: He is alert and oriented to person, place, and time. Mental status is at baseline.   Psychiatric:         Mood and Affect: Mood normal.         Behavior: Behavior normal.         Thought Content: Thought content normal.         ASSESSMENT/PLAN:   Patient is a 57 y.o. male with of tobacco abuse, MAC followed by ID, and obstructive lung disease/emphysema presents to clinic to establish care.   Patient declined most healthcare maintenance preventative measure at this visit and would like to delay until next visit.     1. Tobacco abuse  -Has cut down recently, but significant >30 year hx 1.5 ppd  -Ordered US Abdominal Aorta  -Low dose CT next due 05/2023    2. Arthralgia, unspecified joint  -Unsure if related to antibiotic treatments vs debility 2/2 prolonged immobilization since hospitalization. Advised patient to discuss potential side effects with ID. May still want to continue and treat side-effects  -Apply LIDOcaine (LIDODERM) 5 %; Place 1 patch onto the skin once daily. Remove & Discard patch within 12 hours or as directed by MD   -Apply diclofenac sodium (VOLTAREN) 1 % Gel PRN  -Started on meloxicam (MOBIC) 15 MG PRN    3. Mycobacterium avium-intracellulare complex  -Continue treatment per ID Dr. Henao  -Continue Rifampin, Ethambutol, and Azithromycin     4. Myalgia  -Unsure if related to antibiotics  -Ordered CK for concern for drug induced  myopathy  -Will consider muscle relaxer at next visit    5. Other emphysema  -Sees Pulmonology  -Continue Symbicort, albuterol PRN, and Duonebs PRN  -On home oxygen 2L NC    Follow-up: 2-3 months     A total of 60 minutes was spent on patient care during this encounter which included chart review, examining the patient, formulating a treatment plan and documentation.     Medical decision making straight forward and not complex during this visit.     Mary Harris MD  [facility], PGY-1  12/08/2022

## 2023-01-23 ENCOUNTER — OFFICE VISIT (OUTPATIENT)
Dept: INFECTIOUS DISEASES | Facility: CLINIC | Age: 58
End: 2023-01-23
Payer: MEDICAID

## 2023-01-23 VITALS
HEIGHT: 72 IN | DIASTOLIC BLOOD PRESSURE: 83 MMHG | HEART RATE: 104 BPM | WEIGHT: 162.06 LBS | BODY MASS INDEX: 21.95 KG/M2 | SYSTOLIC BLOOD PRESSURE: 144 MMHG

## 2023-01-23 DIAGNOSIS — A31.0 MYCOBACTERIUM AVIUM-INTRACELLULARE INFECTION: Primary | ICD-10-CM

## 2023-01-23 PROCEDURE — 99213 OFFICE O/P EST LOW 20 MIN: CPT | Mod: PBBFAC,PN | Performed by: INTERNAL MEDICINE

## 2023-01-23 PROCEDURE — 3008F PR BODY MASS INDEX (BMI) DOCUMENTED: ICD-10-PCS | Mod: CPTII,,, | Performed by: INTERNAL MEDICINE

## 2023-01-23 PROCEDURE — 3079F DIAST BP 80-89 MM HG: CPT | Mod: CPTII,,, | Performed by: INTERNAL MEDICINE

## 2023-01-23 PROCEDURE — 99213 OFFICE O/P EST LOW 20 MIN: CPT | Mod: S$PBB,,, | Performed by: INTERNAL MEDICINE

## 2023-01-23 PROCEDURE — 99999 PR PBB SHADOW E&M-EST. PATIENT-LVL III: CPT | Mod: PBBFAC,,, | Performed by: INTERNAL MEDICINE

## 2023-01-23 PROCEDURE — 3077F SYST BP >= 140 MM HG: CPT | Mod: CPTII,,, | Performed by: INTERNAL MEDICINE

## 2023-01-23 PROCEDURE — 99999 PR PBB SHADOW E&M-EST. PATIENT-LVL III: ICD-10-PCS | Mod: PBBFAC,,, | Performed by: INTERNAL MEDICINE

## 2023-01-23 PROCEDURE — 99213 PR OFFICE/OUTPT VISIT, EST, LEVL III, 20-29 MIN: ICD-10-PCS | Mod: S$PBB,,, | Performed by: INTERNAL MEDICINE

## 2023-01-23 PROCEDURE — 3008F BODY MASS INDEX DOCD: CPT | Mod: CPTII,,, | Performed by: INTERNAL MEDICINE

## 2023-01-23 PROCEDURE — 3077F PR MOST RECENT SYSTOLIC BLOOD PRESSURE >= 140 MM HG: ICD-10-PCS | Mod: CPTII,,, | Performed by: INTERNAL MEDICINE

## 2023-01-23 PROCEDURE — 3079F PR MOST RECENT DIASTOLIC BLOOD PRESSURE 80-89 MM HG: ICD-10-PCS | Mod: CPTII,,, | Performed by: INTERNAL MEDICINE

## 2023-01-23 PROCEDURE — 1159F MED LIST DOCD IN RCRD: CPT | Mod: CPTII,,, | Performed by: INTERNAL MEDICINE

## 2023-01-23 PROCEDURE — 1159F PR MEDICATION LIST DOCUMENTED IN MEDICAL RECORD: ICD-10-PCS | Mod: CPTII,,, | Performed by: INTERNAL MEDICINE

## 2023-01-23 RX ORDER — AZITHROMYCIN 500 MG/1
500 TABLET, FILM COATED ORAL DAILY
Qty: 30 TABLET | Refills: 5 | Status: SHIPPED | OUTPATIENT
Start: 2023-01-23 | End: 2023-04-24 | Stop reason: SDUPTHER

## 2023-01-23 RX ORDER — RIFAMPIN 300 MG/1
600 CAPSULE ORAL DAILY
Qty: 60 CAPSULE | Refills: 5 | Status: SHIPPED | OUTPATIENT
Start: 2023-01-23 | End: 2023-04-24 | Stop reason: SDUPTHER

## 2023-01-23 RX ORDER — ETHAMBUTOL HYDROCHLORIDE 400 MG/1
800 TABLET, FILM COATED ORAL DAILY
Qty: 60 TABLET | Refills: 5 | Status: SHIPPED | OUTPATIENT
Start: 2023-01-23 | End: 2023-04-24 | Stop reason: SDUPTHER

## 2023-01-23 NOTE — PROGRESS NOTES
Infectious Disease Clinic  Clinic note    Patient Name: Hai Gonzales  YOB: 1965    PRESENTING HISTORY       History of Present Illness:  Mr. Hai Gonzales is a 56 y/o male with tobacco abuse and no other significant history recently presented to [facility] ED with complaints of hemoptysis that started at 0430 day of admission. Endorsed night sweats, chills, intermittent subjective fever, and decreased appetite over the past two months. + intermittent non-bloody diarrheal episodes over the past week.~20lbs weight loss over the past month. He reports smoking history of 1.5 packs daily x 40 years (60 PY). CTA with worsening LLL and RUL bronchiectasis, bronchial thickening, and coalescent cavitation. Cxs were positive for MAC. He was meant to follow up in Non-TB mycobacterium clinic with Dr. Jolley but he states he can not get downtown for appts because of transportation.     1/23 - he started on azithromycin 500mg daily, ethambutol 800mg daily, and rifampin 600mg daily at last visit roughly 3 months ago. No side effects. Mild improvement in symptoms    Review of Systems:  Negative except as above    PAST HISTORY:   No past medical history on file.    No past surgical history on file.    No family history on file.    Social History     Socioeconomic History    Marital status: Significant Other   Tobacco Use    Smoking status: Every Day     Packs/day: 0.25     Types: Cigarettes    Smokeless tobacco: Never       MEDICATIONS & ALLERGIES:     Current Outpatient Medications on File Prior to Visit   Medication Sig    albuterol (PROVENTIL/VENTOLIN HFA) 90 mcg/actuation inhaler Inhale 2 puffs into the lungs every 6 (six) hours as needed for Wheezing. Rescue    albuterol-ipratropium (DUO-NEB) 2.5 mg-0.5 mg/3 mL nebulizer solution Take 3 mLs by nebulization every 6 (six) hours as needed for Wheezing. Rescue    budesonide-formoterol 160-4.5 mcg (SYMBICORT) 160-4.5 mcg/actuation HFAA Inhale 2 puffs into the lungs every 12  (twelve) hours. Controller    diclofenac sodium (VOLTAREN) 1 % Gel Apply 2 g topically 4 (four) times daily.    nicotine (NICODERM CQ) 21 mg/24 hr Place 1 patch onto the skin once daily.    pulse oximeter (PULSE OXIMETER) device by Apply Externally route 2 (two) times a day. Use twice daily at 8 AM and 3 PM and record the value in MyChart as directed.    [DISCONTINUED] azithromycin (ZITHROMAX) 500 MG tablet Take 1 tablet (500 mg total) by mouth once daily.    [DISCONTINUED] ethambutoL (MYAMBUTOL) 400 MG Tab Take 2 tablets (800 mg total) by mouth once daily.    [DISCONTINUED] rifAMpin (RIFADIN) 300 MG capsule Take 2 capsules (600 mg total) by mouth once daily.     No current facility-administered medications on file prior to visit.       Review of patient's allergies indicates:  No Known Allergies    OBJECTIVE:   Vital Signs:  Vitals:    01/23/23 1011   BP: (!) 144/83   Pulse: 104   Weight: 73.5 kg (162 lb 0.6 oz)   Height: 6' (1.829 m)       No results found for this or any previous visit (from the past 24 hour(s)).      Physical Exam:   General:  Well developed, well nourished, no acute distress  HEENT:  Normocephalic, atraumatic, EOMI, clear sclera, throat clear without erythema or exudates  CVS:  RRR, S1 and S2 normal, no murmurs, rubs, gallops  Resp:  + rales, rhonchi  GI:  Abdomen soft, non-tender, non-distended, normoactive bowel sounds, no masses  MSK:  No muscle atrophy, peripheral edema, full range of motion  Skin:  No rashes, ulcers, erythema  Neuro:  CNII-XII grossly intact  Psych:  Alert and oriented to person, place, and time    ASSESSMENT:     1. Mycobacterium avium-intracellulare infection  56 y/o male with tobacco abuse and no other significant history recently presented to [facility] ED with complaints of hemoptysis and was found to have cavitary MAC disease       PLAN:   -negative HIV test this year  -continue azithromycin 500mg daily, ethambutol 800mg daily, and rifampin 600mg daily  -ordered repeat  cxs   -discussed side effects  -RTC 3 months

## 2023-01-24 ENCOUNTER — LAB VISIT (OUTPATIENT)
Dept: LAB | Facility: HOSPITAL | Age: 58
End: 2023-01-24
Attending: INTERNAL MEDICINE
Payer: MEDICAID

## 2023-01-24 DIAGNOSIS — A31.0 MYCOBACTERIUM AVIUM-INTRACELLULARE INFECTION: ICD-10-CM

## 2023-01-24 PROCEDURE — 87153 DNA/RNA SEQUENCING: CPT

## 2023-01-24 PROCEDURE — 87118 MYCOBACTERIC IDENTIFICATION: CPT | Performed by: INTERNAL MEDICINE

## 2023-01-24 PROCEDURE — 87186 SC STD MICRODIL/AGAR DIL: CPT | Performed by: INTERNAL MEDICINE

## 2023-01-24 PROCEDURE — 30000890 HC MISC. SEND OUT TEST

## 2023-01-24 PROCEDURE — 87206 SMEAR FLUORESCENT/ACID STAI: CPT | Performed by: INTERNAL MEDICINE

## 2023-01-24 PROCEDURE — 87116 MYCOBACTERIA CULTURE: CPT | Performed by: INTERNAL MEDICINE

## 2023-01-24 PROCEDURE — 87015 SPECIMEN INFECT AGNT CONCNTJ: CPT | Performed by: INTERNAL MEDICINE

## 2023-01-24 PROCEDURE — 87118 MYCOBACTERIC IDENTIFICATION: CPT

## 2023-02-10 ENCOUNTER — TELEPHONE (OUTPATIENT)
Dept: INFECTIOUS DISEASES | Facility: HOSPITAL | Age: 58
End: 2023-02-10
Payer: MEDICAID

## 2023-02-10 ENCOUNTER — DOCUMENTATION ONLY (OUTPATIENT)
Dept: INFECTIOUS DISEASES | Facility: HOSPITAL | Age: 58
End: 2023-02-10
Payer: MEDICAID

## 2023-02-11 NOTE — TELEPHONE ENCOUNTER
Called by micro lab for AFB+ sputum =- Patient with known history of TIMOTEO infection - no intervention is needed

## 2023-02-13 ENCOUNTER — HOSPITAL ENCOUNTER (OUTPATIENT)
Dept: RADIOLOGY | Facility: HOSPITAL | Age: 58
Discharge: HOME | End: 2023-02-13
Payer: MEDICAID

## 2023-02-13 DIAGNOSIS — Z72.0 TOBACCO ABUSE: ICD-10-CM

## 2023-02-13 PROCEDURE — 76775 US EXAM ABDO BACK WALL LIM: CPT | Mod: 26,,, | Performed by: RADIOLOGY

## 2023-02-13 PROCEDURE — 76775 US ABDOMINAL AORTA: ICD-10-PCS | Mod: 26,,, | Performed by: RADIOLOGY

## 2023-02-13 PROCEDURE — 76775 US EXAM ABDO BACK WALL LIM: CPT | Mod: TC

## 2023-02-20 ENCOUNTER — TELEPHONE (OUTPATIENT)
Dept: PULMONOLOGY | Facility: CLINIC | Age: 58
End: 2023-02-20
Payer: MEDICAID

## 2023-02-20 NOTE — TELEPHONE ENCOUNTER
----- Message from Jimmie Campos sent at 2/20/2023  2:43 PM CST -----  Contact: pt  Pt requesting call back RE: returning call    Confirmed contact below:  Contact Name:Hai Gonzales  Phone Number: [phone]

## 2023-02-20 NOTE — TELEPHONE ENCOUNTER
----- Message from Liliana Clements sent at 2/20/2023  2:20 PM CST -----  Regarding: Medication  Pt is Requesting a Call back regarding a Refill Request for The Albuterol Rescue Inhaler Please call to discuss further .      [phone]

## 2023-02-20 NOTE — TELEPHONE ENCOUNTER
Left message on patient voicemail, informing him that I'm contacting him in regards to his message. I advised patient that if he wants to schedule appointment or if he has any questions or concerns, he may contact the office. Office number has been provided.

## 2023-02-23 ENCOUNTER — TELEPHONE (OUTPATIENT)
Dept: PULMONOLOGY | Facility: CLINIC | Age: 58
End: 2023-02-23
Payer: MEDICAID

## 2023-02-23 DIAGNOSIS — J43.8 OTHER EMPHYSEMA: ICD-10-CM

## 2023-02-23 NOTE — TELEPHONE ENCOUNTER
----- Message from Corinne Veloz sent at 2/23/2023  3:03 PM CST -----  Hai Gonzales calling regarding Refills for albuterol (PROVENTIL/VENTOLIN HFA) 90 mcg/actuation inhaler, PT stated he called Friday last week and no response, call back if needed, [phone]      [id number] - [place] - [address] [facility] [address]  [facility] [place] [address]  Phone: [phone] Fax: [phone]

## 2023-02-24 ENCOUNTER — TELEPHONE (OUTPATIENT)
Dept: FAMILY MEDICINE | Facility: HOSPITAL | Age: 58
End: 2023-02-24
Payer: MEDICAID

## 2023-02-24 DIAGNOSIS — M79.10 MYALGIA: Primary | ICD-10-CM

## 2023-02-24 RX ORDER — LIDOCAINE 50 MG/G
1 PATCH TOPICAL DAILY
Qty: 7 PATCH | Refills: 3 | Status: SHIPPED | OUTPATIENT
Start: 2023-02-24 | End: 2023-03-03

## 2023-02-24 NOTE — TELEPHONE ENCOUNTER
Pt is aware that Dr. Harris is not in the office today.  He will wait for her return vs another physician calling him.     ----- Message from Emily Domingo MA sent at 2/23/2023  3:29 PM CST -----  Contact: Patient [phone]  Patient left message Monday for results; says received no return call.  Please call patient.  Thanks.

## 2023-02-24 NOTE — TELEPHONE ENCOUNTER
Called patient to discuss the results of his abdominal aorta ultrasound. No abdominal aortic aneurysm noted. All questions were answered. Patient verbalized understanding.    Patient also requesting Lidocaine patches for his myalgias. Feels like the patches help more than the voltaren gel. Will send in more patches.    Mary Harris MD  [facility], PGY-1  02/24/2023

## 2023-02-27 RX ORDER — ALBUTEROL SULFATE 90 UG/1
2 AEROSOL, METERED RESPIRATORY (INHALATION) EVERY 6 HOURS PRN
Qty: 18 G | Refills: 11 | Status: SHIPPED | OUTPATIENT
Start: 2023-02-27 | End: 2023-03-24 | Stop reason: SDUPTHER

## 2023-03-24 ENCOUNTER — TELEPHONE (OUTPATIENT)
Dept: PULMONOLOGY | Facility: CLINIC | Age: 58
End: 2023-03-24
Payer: MEDICAID

## 2023-03-24 DIAGNOSIS — J43.8 OTHER EMPHYSEMA: ICD-10-CM

## 2023-03-24 RX ORDER — ALBUTEROL SULFATE 90 UG/1
2 AEROSOL, METERED RESPIRATORY (INHALATION) EVERY 6 HOURS PRN
Qty: 18 G | Refills: 11 | Status: SHIPPED | OUTPATIENT
Start: 2023-03-24 | End: 2024-03-23

## 2023-03-24 NOTE — TELEPHONE ENCOUNTER
----- Message from Anum Holly sent at 3/24/2023  2:55 PM CDT -----  Regarding: Refill  Contact: Pt @ [phone]  Rx Refill/Request    Is this a Refill or New Rx:Refill    Rx Name and Strength:albuterol (PROVENTIL/VENTOLIN HFA) 90 mcg/actuation inhaler    Preferred Pharmacy with phone number:  [id number] - [place] - [address] AT [facility] [address]  [facility] [place] [address]  Phone: [phone] Fax: [phone]    Communication Preference:Pt @ [phone]    Additional Information:

## 2023-04-20 LAB — M TB DNA SPEC QL NAA+PROBE: ABNORMAL

## 2023-04-24 ENCOUNTER — OFFICE VISIT (OUTPATIENT)
Dept: INFECTIOUS DISEASES | Facility: CLINIC | Age: 58
End: 2023-04-24
Payer: MEDICAID

## 2023-04-24 VITALS
HEIGHT: 72 IN | WEIGHT: 166 LBS | HEART RATE: 111 BPM | DIASTOLIC BLOOD PRESSURE: 80 MMHG | SYSTOLIC BLOOD PRESSURE: 144 MMHG | BODY MASS INDEX: 22.48 KG/M2

## 2023-04-24 DIAGNOSIS — A31.0 MYCOBACTERIUM AVIUM-INTRACELLULARE INFECTION: Primary | ICD-10-CM

## 2023-04-24 PROCEDURE — 3077F SYST BP >= 140 MM HG: CPT | Mod: CPTII,,, | Performed by: INTERNAL MEDICINE

## 2023-04-24 PROCEDURE — 99999 PR PBB SHADOW E&M-EST. PATIENT-LVL III: ICD-10-PCS | Mod: PBBFAC,,, | Performed by: INTERNAL MEDICINE

## 2023-04-24 PROCEDURE — 3008F BODY MASS INDEX DOCD: CPT | Mod: CPTII,,, | Performed by: INTERNAL MEDICINE

## 2023-04-24 PROCEDURE — 1159F MED LIST DOCD IN RCRD: CPT | Mod: CPTII,,, | Performed by: INTERNAL MEDICINE

## 2023-04-24 PROCEDURE — 99213 PR OFFICE/OUTPT VISIT, EST, LEVL III, 20-29 MIN: ICD-10-PCS | Mod: S$PBB,,, | Performed by: INTERNAL MEDICINE

## 2023-04-24 PROCEDURE — 99213 OFFICE O/P EST LOW 20 MIN: CPT | Mod: PBBFAC,PN | Performed by: INTERNAL MEDICINE

## 2023-04-24 PROCEDURE — 3079F DIAST BP 80-89 MM HG: CPT | Mod: CPTII,,, | Performed by: INTERNAL MEDICINE

## 2023-04-24 PROCEDURE — 99213 OFFICE O/P EST LOW 20 MIN: CPT | Mod: S$PBB,,, | Performed by: INTERNAL MEDICINE

## 2023-04-24 PROCEDURE — 1159F PR MEDICATION LIST DOCUMENTED IN MEDICAL RECORD: ICD-10-PCS | Mod: CPTII,,, | Performed by: INTERNAL MEDICINE

## 2023-04-24 PROCEDURE — 99999 PR PBB SHADOW E&M-EST. PATIENT-LVL III: CPT | Mod: PBBFAC,,, | Performed by: INTERNAL MEDICINE

## 2023-04-24 PROCEDURE — 3008F PR BODY MASS INDEX (BMI) DOCUMENTED: ICD-10-PCS | Mod: CPTII,,, | Performed by: INTERNAL MEDICINE

## 2023-04-24 PROCEDURE — 3077F PR MOST RECENT SYSTOLIC BLOOD PRESSURE >= 140 MM HG: ICD-10-PCS | Mod: CPTII,,, | Performed by: INTERNAL MEDICINE

## 2023-04-24 PROCEDURE — 3079F PR MOST RECENT DIASTOLIC BLOOD PRESSURE 80-89 MM HG: ICD-10-PCS | Mod: CPTII,,, | Performed by: INTERNAL MEDICINE

## 2023-04-24 RX ORDER — AZITHROMYCIN 500 MG/1
500 TABLET, FILM COATED ORAL DAILY
Qty: 30 TABLET | Refills: 5 | Status: SHIPPED | OUTPATIENT
Start: 2023-04-24 | End: 2023-07-24 | Stop reason: SDUPTHER

## 2023-04-24 RX ORDER — RIFAMPIN 300 MG/1
600 CAPSULE ORAL DAILY
Qty: 60 CAPSULE | Refills: 5 | Status: SHIPPED | OUTPATIENT
Start: 2023-04-24 | End: 2024-02-29

## 2023-04-24 RX ORDER — ETHAMBUTOL HYDROCHLORIDE 400 MG/1
800 TABLET, FILM COATED ORAL DAILY
Qty: 60 TABLET | Refills: 5 | Status: SHIPPED | OUTPATIENT
Start: 2023-04-24 | End: 2023-07-24 | Stop reason: SDUPTHER

## 2023-04-24 NOTE — PROGRESS NOTES
Infectious Disease Clinic  Clinic note    Patient Name: Hai Gonzales  YOB: 1965    PRESENTING HISTORY       History of Present Illness:  Mr. Hai Gonzales is a 56 y/o male with tobacco abuse and no other significant history recently presented to [facility] ED with complaints of hemoptysis that started at 0430 day of admission. Endorsed night sweats, chills, intermittent subjective fever, and decreased appetite over the past two months. + intermittent non-bloody diarrheal episodes over the past week.~20lbs weight loss over the past month. He reports smoking history of 1.5 packs daily x 40 years (60 PY). CTA with worsening LLL and RUL bronchiectasis, bronchial thickening, and coalescent cavitation. Cxs were positive for MAC. He was meant to follow up in Non-TB mycobacterium clinic with Dr. Jolley but he states he can not get downtown for appts because of transportation.     1/23 - he started on azithromycin 500mg daily, ethambutol 800mg daily, and rifampin 600mg daily at last visit roughly 3 months ago. No side effects. Mild improvement in symptoms    4/24 - He feels the same. AFB cx from 1/23 with AFB growth. Initially was reported incorrectly. Awaiting amended report    Review of Systems:  Negative except as above    PAST HISTORY:   History reviewed. No pertinent past medical history.    History reviewed. No pertinent surgical history.    History reviewed. No pertinent family history.    Social History     Socioeconomic History    Marital status: Significant Other   Tobacco Use    Smoking status: Every Day     Packs/day: 0.25     Types: Cigarettes    Smokeless tobacco: Never       MEDICATIONS & ALLERGIES:     Current Outpatient Medications on File Prior to Visit   Medication Sig    albuterol (PROVENTIL/VENTOLIN HFA) 90 mcg/actuation inhaler Inhale 2 puffs into the lungs every 6 (six) hours as needed for Wheezing. Rescue    albuterol-ipratropium (DUO-NEB) 2.5 mg-0.5 mg/3 mL nebulizer solution Take 3 mLs by  nebulization every 6 (six) hours as needed for Wheezing. Rescue    budesonide-formoterol 160-4.5 mcg (SYMBICORT) 160-4.5 mcg/actuation HFAA Inhale 2 puffs into the lungs every 12 (twelve) hours. Controller    nicotine (NICODERM CQ) 21 mg/24 hr Place 1 patch onto the skin once daily.    pulse oximeter (PULSE OXIMETER) device by Apply Externally route 2 (two) times a day. Use twice daily at 8 AM and 3 PM and record the value in MyChart as directed.    [DISCONTINUED] azithromycin (ZITHROMAX) 500 MG tablet Take 1 tablet (500 mg total) by mouth once daily.    [DISCONTINUED] ethambutoL (MYAMBUTOL) 400 MG Tab Take 2 tablets (800 mg total) by mouth once daily.    [DISCONTINUED] rifAMpin (RIFADIN) 300 MG capsule Take 2 capsules (600 mg total) by mouth once daily.    diclofenac sodium (VOLTAREN) 1 % Gel Apply 2 g topically 4 (four) times daily.     No current facility-administered medications on file prior to visit.       Review of patient's allergies indicates:  No Known Allergies    OBJECTIVE:   Vital Signs:  Vitals:    04/24/23 0826   BP: (!) 144/80   Pulse: (!) 111   Weight: 75.3 kg (166 lb 0.1 oz)   Height: 6' (1.829 m)       No results found for this or any previous visit (from the past 24 hour(s)).      Physical Exam:   General:  Well developed, well nourished, no acute distress  HEENT:  Normocephalic, atraumatic, EOMI, clear sclera, throat clear without erythema or exudates  CVS:  RRR, S1 and S2 normal, no murmurs, rubs, gallops  Resp:  + rales, rhonchi  GI:  Abdomen soft, non-tender, non-distended, normoactive bowel sounds, no masses  MSK:  No muscle atrophy, peripheral edema, full range of motion  Skin:  No rashes, ulcers, erythema  Neuro:  CNII-XII grossly intact  Psych:  Alert and oriented to person, place, and time    ASSESSMENT:     1. Mycobacterium avium-intracellulare infection  58 y/o male with tobacco abuse and no other significant history recently presented to [facility] ED with complaints of hemoptysis and was  found to have cavitary MAC disease       PLAN:   -negative HIV test last year  -continue azithromycin 500mg daily, ethambutol 800mg daily, and rifampin 600mg daily given initial AFB cxs with MAC x 3  -await latest AFB reporting   -discussed side effects  -RTC 3 months

## 2023-05-08 LAB — ACID FAST SUSCEPTIBILITY, SLOW GROWER: ABNORMAL

## 2023-05-10 LAB — ACID FAST SUSCEPTIBILITY, SLOW GROWER: ABNORMAL

## 2023-05-18 LAB
ACID FAST MOD KINY STN SPEC: ABNORMAL
MYCOBACTERIUM SPEC QL CULT: ABNORMAL

## 2023-05-22 LAB — ACID FAST SUSCEPTIBILITY, SLOW GROWER: ABNORMAL

## 2023-07-24 ENCOUNTER — OFFICE VISIT (OUTPATIENT)
Dept: FAMILY MEDICINE | Facility: HOSPITAL | Age: 58
End: 2023-07-24
Payer: MEDICAID

## 2023-07-24 ENCOUNTER — OFFICE VISIT (OUTPATIENT)
Dept: INFECTIOUS DISEASES | Facility: CLINIC | Age: 58
End: 2023-07-24
Payer: MEDICAID

## 2023-07-24 ENCOUNTER — HOSPITAL ENCOUNTER (OUTPATIENT)
Dept: RADIOLOGY | Facility: HOSPITAL | Age: 58
Discharge: HOME | End: 2023-07-24
Attending: STUDENT IN AN ORGANIZED HEALTH CARE EDUCATION/TRAINING PROGRAM
Payer: MEDICAID

## 2023-07-24 VITALS
DIASTOLIC BLOOD PRESSURE: 80 MMHG | SYSTOLIC BLOOD PRESSURE: 138 MMHG | HEIGHT: 72 IN | HEART RATE: 105 BPM | WEIGHT: 170.88 LBS | BODY MASS INDEX: 23.14 KG/M2

## 2023-07-24 VITALS
HEIGHT: 72 IN | BODY MASS INDEX: 23.09 KG/M2 | WEIGHT: 170.44 LBS | HEART RATE: 122 BPM | SYSTOLIC BLOOD PRESSURE: 138 MMHG | DIASTOLIC BLOOD PRESSURE: 86 MMHG

## 2023-07-24 DIAGNOSIS — Z12.11 ENCOUNTER FOR SCREENING FOR MALIGNANT NEOPLASM OF COLON: ICD-10-CM

## 2023-07-24 DIAGNOSIS — J43.8 OTHER EMPHYSEMA: ICD-10-CM

## 2023-07-24 DIAGNOSIS — A31.0 MYCOBACTERIUM AVIUM-INTRACELLULARE INFECTION: Primary | ICD-10-CM

## 2023-07-24 DIAGNOSIS — M17.10 ARTHRITIS OF KNEE: Primary | ICD-10-CM

## 2023-07-24 DIAGNOSIS — A31.0 MYCOBACTERIUM AVIUM-INTRACELLULARE COMPLEX: ICD-10-CM

## 2023-07-24 DIAGNOSIS — M17.10 ARTHRITIS OF KNEE: ICD-10-CM

## 2023-07-24 PROCEDURE — 1159F MED LIST DOCD IN RCRD: CPT | Mod: CPTII,,, | Performed by: INTERNAL MEDICINE

## 2023-07-24 PROCEDURE — 1159F PR MEDICATION LIST DOCUMENTED IN MEDICAL RECORD: ICD-10-PCS | Mod: CPTII,,, | Performed by: INTERNAL MEDICINE

## 2023-07-24 PROCEDURE — 99213 OFFICE O/P EST LOW 20 MIN: CPT | Mod: PBBFAC,PN | Performed by: INTERNAL MEDICINE

## 2023-07-24 PROCEDURE — 99213 PR OFFICE/OUTPT VISIT, EST, LEVL III, 20-29 MIN: ICD-10-PCS | Mod: S$PBB,,, | Performed by: INTERNAL MEDICINE

## 2023-07-24 PROCEDURE — 3075F PR MOST RECENT SYSTOLIC BLOOD PRESS GE 130-139MM HG: ICD-10-PCS | Mod: CPTII,,, | Performed by: INTERNAL MEDICINE

## 2023-07-24 PROCEDURE — 99999 PR PBB SHADOW E&M-EST. PATIENT-LVL III: CPT | Mod: PBBFAC,,, | Performed by: INTERNAL MEDICINE

## 2023-07-24 PROCEDURE — 3079F DIAST BP 80-89 MM HG: CPT | Mod: CPTII,,, | Performed by: INTERNAL MEDICINE

## 2023-07-24 PROCEDURE — 3075F SYST BP GE 130 - 139MM HG: CPT | Mod: CPTII,,, | Performed by: INTERNAL MEDICINE

## 2023-07-24 PROCEDURE — 3079F PR MOST RECENT DIASTOLIC BLOOD PRESSURE 80-89 MM HG: ICD-10-PCS | Mod: CPTII,,, | Performed by: INTERNAL MEDICINE

## 2023-07-24 PROCEDURE — 3008F PR BODY MASS INDEX (BMI) DOCUMENTED: ICD-10-PCS | Mod: CPTII,,, | Performed by: INTERNAL MEDICINE

## 2023-07-24 PROCEDURE — 99999 PR PBB SHADOW E&M-EST. PATIENT-LVL III: ICD-10-PCS | Mod: PBBFAC,,, | Performed by: INTERNAL MEDICINE

## 2023-07-24 PROCEDURE — 73564 X-RAY EXAM KNEE 4 OR MORE: CPT | Mod: TC,50,FY

## 2023-07-24 PROCEDURE — 99213 OFFICE O/P EST LOW 20 MIN: CPT | Mod: S$PBB,,, | Performed by: INTERNAL MEDICINE

## 2023-07-24 PROCEDURE — 73564 XR KNEE COMP 4 OR MORE VIEWS BILAT: ICD-10-PCS | Mod: 26,50,, | Performed by: RADIOLOGY

## 2023-07-24 PROCEDURE — 3008F BODY MASS INDEX DOCD: CPT | Mod: CPTII,,, | Performed by: INTERNAL MEDICINE

## 2023-07-24 PROCEDURE — 99214 OFFICE O/P EST MOD 30 MIN: CPT | Mod: 27 | Performed by: STUDENT IN AN ORGANIZED HEALTH CARE EDUCATION/TRAINING PROGRAM

## 2023-07-24 PROCEDURE — 73564 X-RAY EXAM KNEE 4 OR MORE: CPT | Mod: 26,50,, | Performed by: RADIOLOGY

## 2023-07-24 RX ORDER — LIDOCAINE HYDROCHLORIDE 10 MG/ML
4 INJECTION, SOLUTION EPIDURAL; INFILTRATION; INTRACAUDAL; PERINEURAL
Status: DISCONTINUED | OUTPATIENT
Start: 2023-07-24 | End: 2023-07-24 | Stop reason: HOSPADM

## 2023-07-24 RX ORDER — IPRATROPIUM BROMIDE AND ALBUTEROL SULFATE 2.5; .5 MG/3ML; MG/3ML
3 SOLUTION RESPIRATORY (INHALATION) EVERY 6 HOURS PRN
Qty: 30 EACH | Refills: 5 | Status: SHIPPED | OUTPATIENT
Start: 2023-07-24 | End: 2024-07-23

## 2023-07-24 RX ORDER — ETHAMBUTOL HYDROCHLORIDE 400 MG/1
1200 TABLET, FILM COATED ORAL DAILY
Qty: 90 TABLET | Refills: 5 | Status: SHIPPED | OUTPATIENT
Start: 2023-07-24 | End: 2024-01-22

## 2023-07-24 RX ORDER — TRIAMCINOLONE ACETONIDE 40 MG/ML
40 INJECTION, SUSPENSION INTRA-ARTICULAR; INTRAMUSCULAR
Status: DISCONTINUED | OUTPATIENT
Start: 2023-07-24 | End: 2023-07-24 | Stop reason: HOSPADM

## 2023-07-24 RX ORDER — LIDOCAINE 50 MG/G
1 PATCH TOPICAL
COMMUNITY
Start: 2023-03-13

## 2023-07-24 RX ORDER — RIFAMPIN 300 MG/1
600 CAPSULE ORAL DAILY
Qty: 60 CAPSULE | Refills: 5 | Status: SHIPPED | OUTPATIENT
Start: 2023-07-24 | End: 2023-07-24 | Stop reason: SDUPTHER

## 2023-07-24 RX ORDER — AZITHROMYCIN 500 MG/1
500 TABLET, FILM COATED ORAL DAILY
Qty: 30 TABLET | Refills: 5 | Status: SHIPPED | OUTPATIENT
Start: 2023-07-24 | End: 2024-02-29

## 2023-07-24 RX ADMIN — TRIAMCINOLONE ACETONIDE 40 MG: 40 INJECTION, SUSPENSION INTRA-ARTICULAR; INTRAMUSCULAR at 01:07

## 2023-07-24 RX ADMIN — LIDOCAINE HYDROCHLORIDE 4 ML: 10 INJECTION, SOLUTION EPIDURAL; INFILTRATION; INTRACAUDAL; PERINEURAL at 01:07

## 2023-07-24 NOTE — PROGRESS NOTES
Infectious Disease Clinic  Clinic note    Patient Name: Hai Gonzales  YOB: 1965    PRESENTING HISTORY       History of Present Illness:  Mr. Hai Gonzales is a 58 y/o male with tobacco abuse and no other significant history recently presented to [facility] ED with complaints of hemoptysis that started at 0430 day of admission. Endorsed night sweats, chills, intermittent subjective fever, and decreased appetite over the past two months. + intermittent non-bloody diarrheal episodes over the past week.~20lbs weight loss over the past month. He reports smoking history of 1.5 packs daily x 40 years (60 PY). CTA with worsening LLL and RUL bronchiectasis, bronchial thickening, and coalescent cavitation. Cxs were positive for MAC. He was meant to follow up in Non-TB mycobacterium clinic with Dr. Jolley but he states he can not get downtown for appts because of transportation.     1/23 - he started on azithromycin 500mg daily, ethambutol 800mg daily, and rifampin 600mg daily at last visit roughly 3 months ago. No side effects. Mild improvement in symptoms    4/24 - He feels the same. AFB cx from 1/23 with AFB growth. Initially was reported incorrectly. Awaiting amended report    7/24 - Since last visit he has continued his MAC treatment as above. His AFB cxs have been growing various mycobacterium. He initially has 3 cultures with AFB from last year. Then on 1/24/23 he grew mycobacterium kansasii. Then he grew MAC again on 4/18/23. 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 He notes no improvement in symptoms. If anything his cough is worse than before for the last 3 weeks    Review of Systems:  Negative except as above    PAST HISTORY:   History reviewed. No pertinent past medical history.    History reviewed. No pertinent surgical history.    History reviewed. No pertinent family history.    Social History     Socioeconomic History    Marital status: Significant Other   Tobacco Use    Smoking status:  Every Day     Packs/day: 0.25     Types: Cigarettes    Smokeless tobacco: Never       MEDICATIONS & ALLERGIES:     Current Outpatient Medications on File Prior to Visit   Medication Sig    albuterol (PROVENTIL/VENTOLIN HFA) 90 mcg/actuation inhaler Inhale 2 puffs into the lungs every 6 (six) hours as needed for Wheezing. Rescue    albuterol-ipratropium (DUO-NEB) 2.5 mg-0.5 mg/3 mL nebulizer solution Take 3 mLs by nebulization every 6 (six) hours as needed for Wheezing. Rescue    budesonide-formoterol 160-4.5 mcg (SYMBICORT) 160-4.5 mcg/actuation HFAA Inhale 2 puffs into the lungs every 12 (twelve) hours. Controller    budesonide-formoterol 80-4.5 mcg (SYMBICORT) 80-4.5 mcg/actuation HFAA Inhale 2 puffs into the lungs every 12 (twelve) hours. Controller    nicotine (NICODERM CQ) 21 mg/24 hr Place 1 patch onto the skin once daily.    pulse oximeter (PULSE OXIMETER) device by Apply Externally route 2 (two) times a day. Use twice daily at 8 AM and 3 PM and record the value in [facility] as directed.    rifAMpin (RIFADIN) 300 MG capsule Take 2 capsules (600 mg total) by mouth once daily.    [DISCONTINUED] azithromycin (ZITHROMAX) 500 MG tablet Take 1 tablet (500 mg total) by mouth once daily.    [DISCONTINUED] ethambutoL (MYAMBUTOL) 400 MG Tab Take 2 tablets (800 mg total) by mouth once daily.    diclofenac sodium (VOLTAREN) 1 % Gel Apply 2 g topically 4 (four) times daily.     No current facility-administered medications on file prior to visit.       Review of patient's allergies indicates:  No Known Allergies    OBJECTIVE:   Vital Signs:  Vitals:    07/24/23 0844   BP: 138/86   Pulse: (!) 122   Weight: 77.3 kg (170 lb 6.7 oz)   Height: 6' (1.829 m)       No results found for this or any previous visit (from the past 24 hour(s)).      Physical Exam:   General:  Well developed, well nourished, no acute distress  HEENT:  Normocephalic, atraumatic, EOMI, clear sclera, throat clear without erythema or exudates  CVS:  RRR, S1  and S2 normal, no murmurs, rubs, gallops  Resp:  + rales, rhonchi  GI:  Abdomen soft, non-tender, non-distended, normoactive bowel sounds, no masses  MSK:  No muscle atrophy, peripheral edema, full range of motion  Skin:  No rashes, ulcers, erythema  Neuro:  CNII-XII grossly intact  Psych:  Alert and oriented to person, place, and time    ASSESSMENT:     1. Mycobacterium avium-intracellulare infection  56 y/o male with tobacco abuse and no other significant history recently presented to [facility] ED with complaints of hemoptysis and was found to have cavitary MAC disease. 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

## 2023-07-24 NOTE — PROGRESS NOTES
PROGRESS NOTE  [facility]    Subjective:       Patient ID: Hai Gonzales is a 58 y.o. male.    Chief Complaint: Shortness of Breath      58-year-old male with past medical history of MAC, emphysema here for follow-up.  Patient has been following with Infectious Disease and pulmonology for his chronic conditions.  Recently saw Infectious Disease and required increasing his antibiotics.  If he cannot get his infection under control will have to follow with Dr. Jolley at [facility].  Patient's chief complaint today is continued requirement of oxygen as well as bilateral knee pain.  O2 sats were 99% on 2 L nasal cannula while resting.  Decreased to 87 while off O2 and while walking, recover to 98 with rest and 2L O2.  Patient also endorses several year history of bilateral knee pain.  Worse with sitting for long periods of time feel stiffness, popping, crackling states that it feels like it of give out on him.    Review of Systems   Constitutional:  Negative for activity change, appetite change, chills and unexpected weight change.   HENT:  Negative for congestion, ear pain, facial swelling, hearing loss and rhinorrhea.    Eyes:  Negative for discharge and redness.   Respiratory:  Positive for shortness of breath and wheezing. Negative for cough.    Cardiovascular:  Negative for chest pain and leg swelling.   Gastrointestinal:  Negative for abdominal distention and abdominal pain.   Genitourinary:  Negative for dysuria and flank pain.   Musculoskeletal:  Positive for arthralgias. Negative for back pain and myalgias.   Skin: Negative.    Neurological:  Negative for facial asymmetry, speech difficulty, weakness and headaches.   Psychiatric/Behavioral:  Negative for agitation and confusion.      Objective:      Vitals:    07/24/23 0949   BP: 138/80   Pulse: 105     Body mass index is 23.17 kg/m².  Physical Exam  Vitals and nursing note reviewed.   Constitutional:       Appearance: Normal appearance.   HENT:      Head:  Normocephalic and atraumatic.   Eyes:      Pupils: Pupils are equal, round, and reactive to light.   Cardiovascular:      Rate and Rhythm: Normal rate and regular rhythm.      Heart sounds: Normal heart sounds.   Pulmonary:      Breath sounds: Wheezing present.   Abdominal:      General: Abdomen is flat.      Palpations: Abdomen is soft.   Musculoskeletal:         General: No swelling or tenderness.      Cervical back: Normal range of motion.      Right knee: Decreased range of motion. No LCL laxity, MCL laxity, ACL laxity or PCL laxity. Abnormal meniscus.      Left knee: Decreased range of motion. No LCL laxity, MCL laxity, ACL laxity or PCL laxity.Abnormal meniscus.   Skin:     General: Skin is warm.   Neurological:      General: No focal deficit present.      Mental Status: He is alert.   Psychiatric:         Mood and Affect: Mood normal.       Large Joint Aspiration/Injection: bilateral knee    Date/Time: 7/24/2023 1:22 PM  Performed by: Sky Beavers MD  Authorized by: Denisha Magallon MD     Indications:  Arthritis and pain  Site marked: the procedure site was marked    Timeout: prior to procedure the correct patient, procedure, and site was verified      Details:  Needle Size:  25 G  Ultrasonic Guidance for needle placement?: No    Approach:  Anteromedial  Location:  Knee  Laterality:  Bilateral  Site:  Bilateral knee  Medications (Right):  4 mL LIDOcaine (PF) 10 mg/ml (1%) 10 mg/mL (1 %); 40 mg triamcinolone acetonide 40 mg/mL  Medications (Left):  4 mL LIDOcaine (PF) 10 mg/ml (1%) 10 mg/mL (1 %); 40 mg triamcinolone acetonide 40 mg/mL  Patient tolerance:  Patient tolerated the procedure well with no immediate complications    Assessment:       1. Arthritis of knee    2. Encounter for screening for malignant neoplasm of colon    3. Other emphysema    4. Mycobacterium avium-intracellulare complex        58-year-old male with above past medical history here for dyspnea, continued need for supplemental O2, and  bilateral knee pain likely 2/2 OA  Plan:       Patient requires continued supplemental O2 based on O2 saturations recorded in clinic.  Recommended patient continue follow-up with ID and pulm.  For patient's knees performed bilateral steroid injection, tolerated procedure well without any issue, obtained x-rays confirming joint space narrowing, will also refer for colonoscopy and physical therapy.  Also refilled DuoNebs.  Arthritis of knee  -     Ambulatory referral/consult to Physical/Occupational Therapy; Future; Expected date: 07/31/2023  -     X-Ray Knee Complete 4 Or More Views Bilat; Future; Expected date: 07/24/2023    Encounter for screening for malignant neoplasm of colon  -     Case Request Endoscopy: COLONOSCOPY    Other emphysema  -     albuterol-ipratropium (DUO-NEB) 2.5 mg-0.5 mg/3 mL nebulizer solution; Take 3 mLs by nebulization every 6 (six) hours as needed for Wheezing. Rescue  Dispense: 30 each; Refill: 5    Mycobacterium avium-intracellulare complex        Follow up in: 2 months        Sky Beavers MD, MPH  [facility], PGY-3    This note was partially created using [employer] Voice Recognition software. Typographical and content errors may occur with this process. While efforts are made to detect and correct such errors, in some cases errors will persist. For this reason, wording in this document should be considered in the proper context and not strictly verbatim.

## 2023-07-26 ENCOUNTER — LAB VISIT (OUTPATIENT)
Dept: LAB | Facility: HOSPITAL | Age: 58
End: 2023-07-26
Attending: INTERNAL MEDICINE
Payer: MEDICAID

## 2023-07-26 DIAGNOSIS — A31.0 MYCOBACTERIUM AVIUM-INTRACELLULARE INFECTION: ICD-10-CM

## 2023-07-26 PROCEDURE — 87206 SMEAR FLUORESCENT/ACID STAI: CPT | Performed by: INTERNAL MEDICINE

## 2023-07-26 PROCEDURE — 87015 SPECIMEN INFECT AGNT CONCNTJ: CPT | Performed by: INTERNAL MEDICINE

## 2023-07-26 PROCEDURE — 87186 SC STD MICRODIL/AGAR DIL: CPT | Performed by: INTERNAL MEDICINE

## 2023-07-26 PROCEDURE — 87118 MYCOBACTERIC IDENTIFICATION: CPT

## 2023-07-26 PROCEDURE — 87116 MYCOBACTERIA CULTURE: CPT | Performed by: INTERNAL MEDICINE

## 2023-07-26 PROCEDURE — 87118 MYCOBACTERIC IDENTIFICATION: CPT | Mod: 59 | Performed by: INTERNAL MEDICINE

## 2023-07-26 PROCEDURE — 87118 MYCOBACTERIC IDENTIFICATION: CPT | Performed by: INTERNAL MEDICINE

## 2023-07-28 NOTE — PROGRESS NOTES
I assume primary medical responsibility for this patient. I have reviewed the history, physical, and assessement & treatment plan with the resident and agree that the care is reasonable and necessary. This service has been performed by a resident with the presence of a teaching physician under the primary care exception. If necessary, an addendum of additional findings or evaluation beyond the resident documentation will be noted below.       Denisha Magallon MD    [facility]

## 2023-08-07 ENCOUNTER — CLINICAL SUPPORT (OUTPATIENT)
Dept: REHABILITATION | Facility: HOSPITAL | Age: 58
End: 2023-08-07
Payer: MEDICAID

## 2023-08-07 DIAGNOSIS — M25.661 DECREASED RANGE OF MOTION (ROM) OF BOTH KNEES: ICD-10-CM

## 2023-08-07 DIAGNOSIS — M17.10 ARTHRITIS OF KNEE: ICD-10-CM

## 2023-08-07 DIAGNOSIS — M25.662 DECREASED RANGE OF MOTION (ROM) OF BOTH KNEES: ICD-10-CM

## 2023-08-07 DIAGNOSIS — R68.89 DECREASED FUNCTIONAL ACTIVITY TOLERANCE: ICD-10-CM

## 2023-08-07 DIAGNOSIS — R29.898 DECREASED STRENGTH OF LOWER EXTREMITY: ICD-10-CM

## 2023-08-07 PROCEDURE — 97161 PT EVAL LOW COMPLEX 20 MIN: CPT | Mod: PN

## 2023-08-07 PROCEDURE — 97110 THERAPEUTIC EXERCISES: CPT | Mod: PN

## 2023-08-07 NOTE — PROGRESS NOTES
[facility]   Physical Therapy Initial Evaluation      Name: Hai Gonzales  [facility] Number: [id number]    Therapy Diagnosis:   Encounter Diagnoses   Name Primary?    Arthritis of knee     Decreased functional activity tolerance     Decreased strength of lower extremity     Decreased range of motion (ROM) of both knees         Physician: Sky Beavers MD    Physician Orders: PT Eval and Treat  Medical Diagnosis from Referral: M17.10 (ICD-10-CM) - Arthritis of knee   Evaluation Date: 2023  Authorization Period Expiration: 2024   Plan of Care Expiration: 23  Progress Note Due: 23  Visit # / Visits authorized:    FOTO:     Precautions: Standard     Time In: 3:00 pm  Time Out: 3:40pm  Total Appointment Time (timed & untimed codes): 40 (LCE + TE) minutes    Subjective     Date of onset: Chronic     History of current condition - Hai reports: pain in both knees. He has has a hx of arthritis in both knees and was a  for 20+ years. Pt main complaint of stiffness and pain with movement, especially after being in a sustained position. Pt has difficulty with stairs, prolonged positioning of knee (driving). Pain can go from mild to severe. Pt denies any numbness or tingling in knees.    Falls: no falls    Imagin23: x-ray  Bilateral knees:  Joint space narrowing of the medial compartments bilaterally.  Knee remaining joint spaces are maintained.  No evidence of acute fracture or dislocation.  No joint effusion.  Atherosclerosis present.       Prior Therapy: None for this condition  Social History:  lives with their family  Occupation:  in past- retired  Prior Level of Function: Pt able to perform all ADLs and work-related duties without difficulty.  Current Level of Function: Pt has moderate difficulty performing stairs, driving, and standing for prolonged periods of time.    Pain:  Current 8/10, worst 9/10, best 0/10   Location: bilateral knee  "   Description:  achy. Stabbing occasionally  Aggravating Factors: Sitting, Bending, Morning, Flexing, and Getting out of bed/chair  Easing Factors: **    Patients goals:   Get back to doing ADLs/ stairs, walking in store without difficulty     Medical History:   No past medical history on file.    Surgical History:   Hai Gonzales  has no past surgical history on file.    Medications:   Hai has a current medication list which includes the following prescription(s): albuterol, albuterol-ipratropium, azithromycin, budesonide-formoterol 160-4.5 mcg, diclofenac sodium, ethambutol, lidocaine, nicotine, pulse oximeter, and rifampin.    Allergies:   Review of patient's allergies indicates:  No Known Allergies     Objective      Posture: rounded shoulders  Palpation: tenderness to bilateral quads and patella insertions with min-mod pressure    Active range of motion:  Right knee: 0-110  Left knee: 0-105    Passive range of motion:  Right knee: 0-113- pain through anterior knee  Left knee: 0-110 - pain through anterior knee    Lower extremity srength with MMT Right Left Pain/dysfunction with movement   (approx 4 sec hold w/ max contraction)   Hip flexion 4+/5  4+/5     Hip abduction 4+/5   4+/5      Quadriceps 4/5 4/5     Hamstrings 4/5   4/5         30" Chair Stand: NT with upper extremity support;    Gait Analysis: Pt ambulated with decreased jean pierre and slight bilateral lean    Limitation/Restriction for FOTO  Survey    Therapist reviewed FOTO scores for Hai Gonzales on 8/7/2023.   FOTO documents entered into [email or web address] - see Media section.    Limitation Score: 78%         Treatment     Total Treatment time (time-based codes) separate from Evaluation: 10 minutes     Hai received the treatments listed below:      therapeutic exercises to develop strength, endurance, ROM, and flexibility for 10 minutes including:  Bridges 2x10  Quad sets: 5"x10  Straight leg raise: x5   Sidelying hip abduction: x5   Prone knee stretch " "3x30"  Seated hamstring stretch in heel prop: 3x30"     Patient Education and Home Exercises     Education provided:   - Findings; prognosis and plan of care  - Home exercise program  - Modality options  - Therapist contact information    Written Home Exercises Provided: Patient instructed to cont prior HEP. Exercises were reviewed and Hai was able to demonstrate them prior to the end of the session.  Hai demonstrated good  understanding of the education provided. See EMR under Patient Instructions for exercises provided during therapy sessions.    Assessment     Hai is a 58 y.o. male referred to outpatient Physical Therapy with a medical diagnosis of M17.10 (ICD-10-CM) - Arthritis of bilateral knee . Patient presents with deficits that consist of decreased AROM, NMC, and strength of bilateral knees. Pt has tenderness through bilateral medial quads, bilateral patellar tendon insertions, and tight IT bands. Pt has functional deficits that consist of decreased ability to navigate stairs, stand/sit for prolonged periods of time, drive, and squat or bend at the knees. Pt would benefit from PT services at this time to address the above deficits and allow for return to PLOF.    Patient prognosis is Good.   Patient will benefit from skilled outpatient Physical Therapy to address the deficits stated above and in the chart below, provide patient /family education, and to maximize patientt's level of independence.     Plan of care discussed with patient: Yes  Patient's spiritual, cultural and educational needs considered and patient is agreeable to the plan of care and goals as stated below:     Anticipated Barriers for therapy: n/a    Medical Necessity is demonstrated by the following  History  Co-morbidities and personal factors that may impact the plan of care [x] LOW: no personal factors / co-morbidities  [] MODERATE: 1-2 personal factors / co-morbidities  [] HIGH: 3+ personal factors / co-morbidities    Moderate / " "High Support Documentation:   Co-morbidities affecting plan of care:     Personal Factors:      Examination  Body Structures and Functions, activity limitations and participation restrictions that may impact the plan of care [x] LOW: addressing 1-2 elements  [] MODERATE: 3+ elements  [] HIGH: 4+ elements (please support below)    Moderate / High Support Documentation:      Clinical Presentation [x] LOW: stable  [] MODERATE: Evolving  [] HIGH: Unstable     Decision Making/ Complexity Score: low       Short Term: (3 Weeks):   1. Patient will be independent with home exercise program to supplement physical therapy treatment in improving functional status.  2. Patient will improve bilateral lower extremity strength to at least 1/2 grade MMT to improve strength for closed chain tasks.   3. Patient will improve bilateral knee active range of motion to 0-120 degrees to promote increased ease of sit<>stand transfers.     Long Term Goals (6 Weeks):   1. Patient will improve bilateral lower extremity strength to at least 1 grade MMT to improve strength for closed chain tasks.   2. Patient will improve the total FOTO Knee Survey Score to </= 50% limited to demonstrate increased perceived functional mobility.  3. Patient will perform at least 12 sit to stands in 30 seconds without UE support to demonstrate increased functional strength.   4. Patient will be able to navigate 10 steps without difficulty to increase ability to ambulate in the community.    5. Patient will be able to drive distances of 30 minutes without difficulty to improve QOL.          30" Chair Stand Cutoff Scores:            Plan     Plan of care Certification: 8/7/2023 to 9/22/23.    Outpatient Physical Therapy 2 times weekly for 6 weeks to include the following interventions: Gait Training, Manual Therapy, Moist Heat/ Ice, Neuromuscular Re-ed, Patient Education, Self Care, Therapeutic Activities, and Therapeutic Exercise.     Gene Javier, PT        "

## 2023-08-10 PROBLEM — M25.662 DECREASED RANGE OF MOTION (ROM) OF BOTH KNEES: Status: ACTIVE | Noted: 2023-08-10

## 2023-08-10 PROBLEM — R68.89 DECREASED FUNCTIONAL ACTIVITY TOLERANCE: Status: ACTIVE | Noted: 2023-08-10

## 2023-08-10 PROBLEM — R29.898 DECREASED STRENGTH OF LOWER EXTREMITY: Status: ACTIVE | Noted: 2023-08-10

## 2023-08-10 PROBLEM — M25.661 DECREASED RANGE OF MOTION (ROM) OF BOTH KNEES: Status: ACTIVE | Noted: 2023-08-10

## 2023-08-10 NOTE — PLAN OF CARE
[facility]   Physical Therapy Initial Evaluation      Name: Hai Gonzlaes  [facility] Number: [id number]    Therapy Diagnosis:   Encounter Diagnoses   Name Primary?    Arthritis of knee     Decreased functional activity tolerance     Decreased strength of lower extremity     Decreased range of motion (ROM) of both knees         Physician: Sky Beavers MD    Physician Orders: PT Eval and Treat  Medical Diagnosis from Referral: M17.10 (ICD-10-CM) - Arthritis of knee   Evaluation Date: 2023  Authorization Period Expiration: 2024   Plan of Care Expiration: 23  Progress Note Due: 23  Visit # / Visits authorized:    FOTO:     Precautions: Standard     Time In: 3:00 pm  Time Out: 3:40pm  Total Appointment Time (timed & untimed codes): 40 (LCE + TE) minutes    Subjective     Date of onset: Chronic     History of current condition - Hai reports: pain in both knees. He has has a hx of arthritis in both knees and was a  for 20+ years. Pt main complaint of stiffness and pain with movement, especially after being in a sustained position. Pt has difficulty with stairs, prolonged positioning of knee (driving). Pain can go from mild to severe. Pt denies any numbness or tingling in knees.    Falls: no falls    Imagin23: x-ray  Bilateral knees:  Joint space narrowing of the medial compartments bilaterally.  Knee remaining joint spaces are maintained.  No evidence of acute fracture or dislocation.  No joint effusion.  Atherosclerosis present.       Prior Therapy: None for this condition  Social History:  lives with their family  Occupation:  in past- retired  Prior Level of Function: Pt able to perform all ADLs and work-related duties without difficulty.  Current Level of Function: Pt has moderate difficulty performing stairs, driving, and standing for prolonged periods of time.    Pain:  Current 8/10, worst 9/10, best 0/10   Location: bilateral knee  Discharge Planning Assessment  [facility]     Patient Name: Leonard Crandall  MRN: [id number]  Today's Date: 1/21/2022    Admit Date: 1/20/2022     Discharge Needs Assessment     Row Name 01/21/22 1042       Living Environment    Lives With other (see comments)    Current Living Arrangements homeless    Primary Care Provided by self    Family Caregiver if Needed none    Living Arrangement Comments Pt is homeless. SW has been notified       Resource/Environmental Concerns    Environment Concerns permanent residence, none    Transportation Concerns car, none       Transition Planning    Patient/Family Anticipates Transition to shelter    Patient/Family Anticipated Services at Transition none    Transportation Anticipated public transportation       Discharge Needs Assessment    Readmission Within the Last 30 Days no previous admission in last 30 days    Equipment Currently Used at Home none    Concerns to be Addressed substance/tobacco abuse/use    Concerns Comments per pt he drinks a gallon of vodka daily    Anticipated Changes Related to Illness none    Equipment Needed After Discharge none    Outpatient/Agency/Support Group Needs other (see comments)               Discharge Plan     Row Name 01/21/22 1043       Plan    Plan inpt drug rehab vs homeless shelter    Patient/Family in Agreement with Plan yes    Plan Comments Pt reports he is currently homeless and has been sleeping on friends couches when offered. He is usually between [facility] He is independent with ADLs and denies use of any DME. Pt does not have a PCP. He does admit to drinking one gallon of vodka daily. SW and chemical dependency team have been consulted. Cm to follow.    Final Discharge Disposition Code 01 - home or self-care              Continued Care and Services - Admitted Since 1/20/2022    Coordination has not been started for this encounter.          Demographic Summary     Row Name 01/21/22 1041       General  "   Description:  achy. Stabbing occasionally  Aggravating Factors: Sitting, Bending, Morning, Flexing, and Getting out of bed/chair  Easing Factors: **    Patients goals:   Get back to doing ADLs/ stairs, walking in store without difficulty     Medical History:   No past medical history on file.    Surgical History:   Hai Gonzales  has no past surgical history on file.    Medications:   Hai has a current medication list which includes the following prescription(s): albuterol, albuterol-ipratropium, azithromycin, budesonide-formoterol 160-4.5 mcg, diclofenac sodium, ethambutol, lidocaine, nicotine, pulse oximeter, and rifampin.    Allergies:   Review of patient's allergies indicates:  No Known Allergies     Objective      Posture: rounded shoulders  Palpation: tenderness to bilateral quads and patella insertions with min-mod pressure    Active range of motion:  Right knee: 0-110  Left knee: 0-105    Passive range of motion:  Right knee: 0-113- pain through anterior knee  Left knee: 0-110 - pain through anterior knee    Lower extremity srength with MMT Right Left Pain/dysfunction with movement   (approx 4 sec hold w/ max contraction)   Hip flexion 4+/5  4+/5     Hip abduction 4+/5   4+/5      Quadriceps 4/5 4/5     Hamstrings 4/5   4/5         30" Chair Stand: NT (first F/U) with upper extremity support;    Gait Analysis: Pt ambulated with decreased jean pierre and slight bilateral lean    Limitation/Restriction for FOTO  Survey    Therapist reviewed FOTO scores for Hai Gonzales on 8/7/2023.   FOTO documents entered into [employer] - see Media section.    Limitation Score: 78%         Treatment     Total Treatment time (time-based codes) separate from Evaluation: 10 minutes     Hai received the treatments listed below:      therapeutic exercises to develop strength, endurance, ROM, and flexibility for 10 minutes including:  Bridges 2x10  Quad sets: 5"x10  Straight leg raise: x5   Sidelying hip abduction: x5   Prone knee " Information    Admission Type inpatient    Referral Source physician       Contact Information    Permission Granted to Share Info With family/designee    Contact Information Comments Godwin Reyes [phone]               Functional Status     Row Name 01/21/22 1042       Functional Status, IADL    Medications independent    Meal Preparation independent    Housekeeping independent    Laundry independent    Shopping independent       Mental Status    General Appearance WDL WDL               Psychosocial    No documentation.                Abuse/Neglect    No documentation.                Legal    No documentation.                Substance Abuse    No documentation.                Patient Forms    No documentation.                   Madeline Quiroga RN     "stretch 3x30"  Seated hamstring stretch in heel prop: 3x30"     Patient Education and Home Exercises     Education provided:   - Findings; prognosis and plan of care  - Home exercise program  - Modality options  - Therapist contact information    Written Home Exercises Provided: Patient instructed to cont prior HEP. Exercises were reviewed and Hai was able to demonstrate them prior to the end of the session.  Hai demonstrated good  understanding of the education provided. See EMR under Patient Instructions for exercises provided during therapy sessions.    Assessment     Hai is a 58 y.o. male referred to outpatient Physical Therapy with a medical diagnosis of M17.10 (ICD-10-CM) - Arthritis of bilateral knee . Patient presents with deficits that consist of decreased AROM, NMC, and strength of bilateral knees. Pt has tenderness through bilateral medial quads, bilateral patellar tendon insertions, and tight IT bands. Pt has functional deficits that consist of decreased ability to navigate stairs, stand/sit for prolonged periods of time, drive, and squat or bend at the knees. Pt would benefit from PT services at this time to address the above deficits and allow for return to PLOF.    Patient prognosis is Good.   Patient will benefit from skilled outpatient Physical Therapy to address the deficits stated above and in the chart below, provide patient /family education, and to maximize patientt's level of independence.     Plan of care discussed with patient: Yes  Patient's spiritual, cultural and educational needs considered and patient is agreeable to the plan of care and goals as stated below:     Anticipated Barriers for therapy: n/a    Medical Necessity is demonstrated by the following  History  Co-morbidities and personal factors that may impact the plan of care [x] LOW: no personal factors / co-morbidities  [] MODERATE: 1-2 personal factors / co-morbidities  [] HIGH: 3+ personal factors / " "co-morbidities    Moderate / High Support Documentation:   Co-morbidities affecting plan of care:     Personal Factors:      Examination  Body Structures and Functions, activity limitations and participation restrictions that may impact the plan of care [x] LOW: addressing 1-2 elements  [] MODERATE: 3+ elements  [] HIGH: 4+ elements (please support below)    Moderate / High Support Documentation:      Clinical Presentation [x] LOW: stable  [] MODERATE: Evolving  [] HIGH: Unstable     Decision Making/ Complexity Score: low       Short Term: (3 Weeks):   1. Patient will be independent with home exercise program to supplement physical therapy treatment in improving functional status.  2. Patient will improve bilateral lower extremity strength to at least 1/2 grade MMT to improve strength for closed chain tasks.   3. Patient will improve bilateral knee active range of motion to 0-120 degrees to promote increased ease of sit<>stand transfers.     Long Term Goals (6 Weeks):   1. Patient will improve bilateral lower extremity strength to at least 1 grade MMT to improve strength for closed chain tasks.   2. Patient will improve the total FOTO Knee Survey Score to </= 50% limited to demonstrate increased perceived functional mobility.  3. Patient will perform at least 12 sit to stands in 30 seconds without UE support to demonstrate increased functional strength.   4. Patient will be able to navigate 10 steps without difficulty to increase ability to ambulate in the community.    5. Patient will be able to drive distances of 30 minutes without difficulty to improve QOL.          30" Chair Stand Cutoff Scores:            Plan     Plan of care Certification: 8/7/2023 to 9/22/23.    Outpatient Physical Therapy 2 times weekly for 6 weeks to include the following interventions: Gait Training, Manual Therapy, Moist Heat/ Ice, Neuromuscular Re-ed, Patient Education, Self Care, Therapeutic Activities, and Therapeutic Exercise. "     Gene Javier, PT

## 2023-09-06 ENCOUNTER — TELEPHONE (OUTPATIENT)
Dept: GASTROENTEROLOGY | Facility: CLINIC | Age: 58
End: 2023-09-06
Payer: MEDICAID

## 2023-09-22 ENCOUNTER — TELEPHONE (OUTPATIENT)
Dept: INFECTIOUS DISEASES | Facility: CLINIC | Age: 58
End: 2023-09-22
Payer: MEDICAID

## 2023-09-22 NOTE — TELEPHONE ENCOUNTER
----- Message from Kat Bella sent at 9/21/2023  4:32 PM CDT -----  .Type:  Needs Medical Advice    Who Called: pt    Would the patient rather a call back or a response via [facility]? Call back  Best Call Back Number: [phone]  Additional Information:     Pt sated she would like a call back to reschedule his appointment for a later time in the evening around 4 pm because he can't make morning appointments

## 2023-09-25 ENCOUNTER — OFFICE VISIT (OUTPATIENT)
Dept: INFECTIOUS DISEASES | Facility: CLINIC | Age: 58
End: 2023-09-25
Payer: MEDICAID

## 2023-09-25 VITALS
SYSTOLIC BLOOD PRESSURE: 143 MMHG | HEART RATE: 120 BPM | BODY MASS INDEX: 23.09 KG/M2 | DIASTOLIC BLOOD PRESSURE: 73 MMHG | WEIGHT: 170.5 LBS | HEIGHT: 72 IN

## 2023-09-25 DIAGNOSIS — A31.0 MYCOBACTERIUM AVIUM-INTRACELLULARE INFECTION: Primary | ICD-10-CM

## 2023-09-25 PROCEDURE — 3077F SYST BP >= 140 MM HG: CPT | Mod: CPTII,,, | Performed by: INTERNAL MEDICINE

## 2023-09-25 PROCEDURE — 99213 OFFICE O/P EST LOW 20 MIN: CPT | Mod: PBBFAC,PN | Performed by: INTERNAL MEDICINE

## 2023-09-25 PROCEDURE — 3078F PR MOST RECENT DIASTOLIC BLOOD PRESSURE < 80 MM HG: ICD-10-PCS | Mod: CPTII,,, | Performed by: INTERNAL MEDICINE

## 2023-09-25 PROCEDURE — 3077F PR MOST RECENT SYSTOLIC BLOOD PRESSURE >= 140 MM HG: ICD-10-PCS | Mod: CPTII,,, | Performed by: INTERNAL MEDICINE

## 2023-09-25 PROCEDURE — 99999 PR PBB SHADOW E&M-EST. PATIENT-LVL III: ICD-10-PCS | Mod: PBBFAC,,, | Performed by: INTERNAL MEDICINE

## 2023-09-25 PROCEDURE — 1159F PR MEDICATION LIST DOCUMENTED IN MEDICAL RECORD: ICD-10-PCS | Mod: CPTII,,, | Performed by: INTERNAL MEDICINE

## 2023-09-25 PROCEDURE — 3008F PR BODY MASS INDEX (BMI) DOCUMENTED: ICD-10-PCS | Mod: CPTII,,, | Performed by: INTERNAL MEDICINE

## 2023-09-25 PROCEDURE — 3008F BODY MASS INDEX DOCD: CPT | Mod: CPTII,,, | Performed by: INTERNAL MEDICINE

## 2023-09-25 PROCEDURE — 99213 PR OFFICE/OUTPT VISIT, EST, LEVL III, 20-29 MIN: ICD-10-PCS | Mod: S$PBB,,, | Performed by: INTERNAL MEDICINE

## 2023-09-25 PROCEDURE — 99213 OFFICE O/P EST LOW 20 MIN: CPT | Mod: S$PBB,,, | Performed by: INTERNAL MEDICINE

## 2023-09-25 PROCEDURE — 3078F DIAST BP <80 MM HG: CPT | Mod: CPTII,,, | Performed by: INTERNAL MEDICINE

## 2023-09-25 PROCEDURE — 1159F MED LIST DOCD IN RCRD: CPT | Mod: CPTII,,, | Performed by: INTERNAL MEDICINE

## 2023-09-25 PROCEDURE — 99999 PR PBB SHADOW E&M-EST. PATIENT-LVL III: CPT | Mod: PBBFAC,,, | Performed by: INTERNAL MEDICINE

## 2023-09-25 NOTE — PROGRESS NOTES
Infectious Disease Clinic  Clinic note    Patient Name: Hai Gonzales  YOB: 1965    PRESENTING HISTORY       History of Present Illness:  Mr. Hai Gonzales is a 56 y/o male with tobacco abuse and no other significant history recently presented to [facility] ED with complaints of hemoptysis that started at 0430 day of admission. Endorsed night sweats, chills, intermittent subjective fever, and decreased appetite over the past two months. + intermittent non-bloody diarrheal episodes over the past week.~20lbs weight loss over the past month. He reports smoking history of 1.5 packs daily x 40 years (60 PY). CTA with worsening LLL and RUL bronchiectasis, bronchial thickening, and coalescent cavitation. Cxs were positive for MAC. He was meant to follow up in Non-TB mycobacterium clinic with Dr. Jolley but he states he can not get downtown for appts because of transportation.     1/23 - he started on azithromycin 500mg daily, ethambutol 800mg daily, and rifampin 600mg daily at last visit roughly 3 months ago. No side effects. Mild improvement in symptoms    4/24 - He feels the same. AFB cx from 1/23 with AFB growth. Initially was reported incorrectly. Awaiting amended report    7/24 - Since last visit he has continued his MAC treatment as above. His AFB cxs have been growing various mycobacterium. He initially has 3 cultures with AFB from last year. Then on 1/24/23 he grew mycobacterium kansasii. Then he grew MAC again on 4/18/23. 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 He notes no improvement in symptoms. If anything his cough is worse than before for the last 3 weeks    9/25 -did not get last AFB cx ordered. Symptoms unchanged.     Review of Systems:  Negative except as above    PAST HISTORY:   History reviewed. No pertinent past medical history.    History reviewed. No pertinent surgical history.    History reviewed. No pertinent family history.    Social History     Socioeconomic History    Marital  status: Significant Other   Tobacco Use    Smoking status: Every Day     Current packs/day: 0.25     Types: Cigarettes    Smokeless tobacco: Never       MEDICATIONS & ALLERGIES:     Current Outpatient Medications on File Prior to Visit   Medication Sig    albuterol (PROVENTIL/VENTOLIN HFA) 90 mcg/actuation inhaler Inhale 2 puffs into the lungs every 6 (six) hours as needed for Wheezing. Rescue    albuterol-ipratropium (DUO-NEB) 2.5 mg-0.5 mg/3 mL nebulizer solution Take 3 mLs by nebulization every 6 (six) hours as needed for Wheezing. Rescue    azithromycin (ZITHROMAX) 500 MG tablet Take 1 tablet (500 mg total) by mouth once daily.    ethambutoL (MYAMBUTOL) 400 MG Tab Take 3 tablets (1,200 mg total) by mouth once daily.    LIDOcaine (LIDODERM) 5 % 1 patch.    nicotine (NICODERM CQ) 21 mg/24 hr Place 1 patch onto the skin once daily.    pulse oximeter (PULSE OXIMETER) device by Apply Externally route 2 (two) times a day. Use twice daily at 8 AM and 3 PM and record the value in MyChart as directed.    rifAMpin (RIFADIN) 300 MG capsule Take 2 capsules (600 mg total) by mouth once daily.    budesonide-formoterol 160-4.5 mcg (SYMBICORT) 160-4.5 mcg/actuation HFAA Inhale 2 puffs into the lungs every 12 (twelve) hours. Controller    diclofenac sodium (VOLTAREN) 1 % Gel Apply 2 g topically 4 (four) times daily.     No current facility-administered medications on file prior to visit.       Review of patient's allergies indicates:  No Known Allergies    OBJECTIVE:   Vital Signs:  Vitals:    09/25/23 0856   BP: (!) 143/73   Pulse: (!) 120   Weight: 77.3 kg (170 lb 8.4 oz)   Height: 6' (1.829 m)       No results found for this or any previous visit (from the past 24 hour(s)).      Physical Exam:   General:  Well developed, well nourished, no acute distress  HEENT:  Normocephalic, atraumatic, EOMI, clear sclera, throat clear without erythema or exudates  CVS:  RRR, S1 and S2 normal, no murmurs, rubs, gallops  Resp:  + rales,  rhonchi  GI:  Abdomen soft, non-tender, non-distended, normoactive bowel sounds, no masses  MSK:  No muscle atrophy, peripheral edema, full range of motion  Skin:  No rashes, ulcers, erythema  Neuro:  CNII-XII grossly intact  Psych:  Alert and oriented to person, place, and time    ASSESSMENT:     1. Mycobacterium avium-intracellulare infection  58 y/o male with tobacco abuse and no other significant history recently presented to [facility] ED with complaints of hemoptysis and was found to have cavitary MAC disease. 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

## 2023-09-26 ENCOUNTER — HOSPITAL ENCOUNTER (OUTPATIENT)
Dept: RADIOLOGY | Facility: HOSPITAL | Age: 58
Discharge: HOME | End: 2023-09-26
Attending: INTERNAL MEDICINE
Payer: MEDICAID

## 2023-09-26 DIAGNOSIS — A31.0 MYCOBACTERIUM AVIUM-INTRACELLULARE INFECTION: ICD-10-CM

## 2023-09-26 PROCEDURE — 87150 DNA/RNA AMPLIFIED PROBE: CPT

## 2023-09-26 PROCEDURE — 71250 CT THORAX DX C-: CPT | Mod: TC

## 2023-09-26 PROCEDURE — 87118 MYCOBACTERIC IDENTIFICATION: CPT

## 2023-09-26 PROCEDURE — 71250 CT THORAX DX C-: CPT | Mod: 26,,, | Performed by: RADIOLOGY

## 2023-09-26 PROCEDURE — 71250 CT CHEST WITHOUT CONTRAST: ICD-10-PCS | Mod: 26,,, | Performed by: RADIOLOGY

## 2023-09-27 DIAGNOSIS — A31.0 MYCOBACTERIUM AVIUM-INTRACELLULARE INFECTION: Primary | ICD-10-CM

## 2023-10-03 ENCOUNTER — HOSPITAL ENCOUNTER (EMERGENCY)
Facility: HOSPITAL | Age: 58
Discharge: HOME | End: 2023-10-03
Attending: EMERGENCY MEDICINE
Payer: MEDICAID

## 2023-10-03 VITALS
HEART RATE: 81 BPM | OXYGEN SATURATION: 98 % | SYSTOLIC BLOOD PRESSURE: 127 MMHG | WEIGHT: 165 LBS | TEMPERATURE: 99 F | RESPIRATION RATE: 17 BRPM | BODY MASS INDEX: 22.38 KG/M2 | DIASTOLIC BLOOD PRESSURE: 75 MMHG

## 2023-10-03 DIAGNOSIS — S22.32XA CLOSED FRACTURE OF ONE RIB OF LEFT SIDE, INITIAL ENCOUNTER: Primary | ICD-10-CM

## 2023-10-03 DIAGNOSIS — T14.90XA BLUNT TRAUMA: ICD-10-CM

## 2023-10-03 DIAGNOSIS — T14.90XA INJURY: ICD-10-CM

## 2023-10-03 LAB
ALBUMIN SERPL BCP-MCNC: 3.9 G/DL (ref 3.5–5.2)
ALP SERPL-CCNC: 85 U/L (ref 55–135)
ALT SERPL W/O P-5'-P-CCNC: 22 U/L (ref 10–44)
ANION GAP SERPL CALC-SCNC: 14 MMOL/L (ref 8–16)
AST SERPL-CCNC: 20 U/L (ref 10–40)
BACTERIA #/AREA URNS HPF: NORMAL /HPF
BASOPHILS # BLD AUTO: 0.08 K/UL (ref 0–0.2)
BASOPHILS NFR BLD: 0.7 % (ref 0–1.9)
BILIRUB SERPL-MCNC: 0.4 MG/DL (ref 0.1–1)
BILIRUB UR QL STRIP: NEGATIVE
BUN SERPL-MCNC: 10 MG/DL (ref 6–20)
CALCIUM SERPL-MCNC: 9.3 MG/DL (ref 8.7–10.5)
CHLORIDE SERPL-SCNC: 102 MMOL/L (ref 95–110)
CLARITY UR: CLEAR
CO2 SERPL-SCNC: 19 MMOL/L (ref 23–29)
COLOR UR: YELLOW
CREAT SERPL-MCNC: 0.8 MG/DL (ref 0.5–1.4)
DIFFERENTIAL METHOD: ABNORMAL
EOSINOPHIL # BLD AUTO: 0.1 K/UL (ref 0–0.5)
EOSINOPHIL NFR BLD: 1.3 % (ref 0–8)
ERYTHROCYTE [DISTWIDTH] IN BLOOD BY AUTOMATED COUNT: 12.5 % (ref 11.5–14.5)
EST. GFR  (NO RACE VARIABLE): >60 ML/MIN/1.73 M^2
GLUCOSE SERPL-MCNC: 100 MG/DL (ref 70–110)
GLUCOSE UR QL STRIP: NEGATIVE
HCT VFR BLD AUTO: 46.2 % (ref 40–54)
HGB BLD-MCNC: 16.1 G/DL (ref 14–18)
HGB UR QL STRIP: NEGATIVE
HYALINE CASTS #/AREA URNS LPF: 0 /LPF
IMM GRANULOCYTES # BLD AUTO: 0.06 K/UL (ref 0–0.04)
IMM GRANULOCYTES NFR BLD AUTO: 0.5 % (ref 0–0.5)
KETONES UR QL STRIP: NEGATIVE
LEUKOCYTE ESTERASE UR QL STRIP: NEGATIVE
LYMPHOCYTES # BLD AUTO: 1.5 K/UL (ref 1–4.8)
LYMPHOCYTES NFR BLD: 13.4 % (ref 18–48)
MCH RBC QN AUTO: 33 PG (ref 27–31)
MCHC RBC AUTO-ENTMCNC: 34.8 G/DL (ref 32–36)
MCV RBC AUTO: 95 FL (ref 82–98)
MICROSCOPIC COMMENT: NORMAL
MONOCYTES # BLD AUTO: 1 K/UL (ref 0.3–1)
MONOCYTES NFR BLD: 8.7 % (ref 4–15)
NEUTROPHILS # BLD AUTO: 8.4 K/UL (ref 1.8–7.7)
NEUTROPHILS NFR BLD: 75.4 % (ref 38–73)
NITRITE UR QL STRIP: NEGATIVE
NRBC BLD-RTO: 0 /100 WBC
PH UR STRIP: 7 [PH] (ref 5–8)
PLATELET # BLD AUTO: 229 K/UL (ref 150–450)
PMV BLD AUTO: 9.6 FL (ref 9.2–12.9)
POTASSIUM SERPL-SCNC: 4 MMOL/L (ref 3.5–5.1)
PROT SERPL-MCNC: 7.7 G/DL (ref 6–8.4)
PROT UR QL STRIP: ABNORMAL
RBC # BLD AUTO: 4.88 M/UL (ref 4.6–6.2)
RBC #/AREA URNS HPF: 1 /HPF (ref 0–4)
SODIUM SERPL-SCNC: 135 MMOL/L (ref 136–145)
SP GR UR STRIP: >1.03 (ref 1–1.03)
SQUAMOUS #/AREA URNS HPF: 3 /HPF
URN SPEC COLLECT METH UR: ABNORMAL
UROBILINOGEN UR STRIP-ACNC: NEGATIVE EU/DL
WBC # BLD AUTO: 11.17 K/UL (ref 3.9–12.7)
WBC #/AREA URNS HPF: 2 /HPF (ref 0–5)

## 2023-10-03 PROCEDURE — 63600175 PHARM REV CODE 636 W HCPCS: Performed by: EMERGENCY MEDICINE

## 2023-10-03 PROCEDURE — 99285 EMERGENCY DEPT VISIT HI MDM: CPT | Mod: 25

## 2023-10-03 PROCEDURE — 85025 COMPLETE CBC W/AUTO DIFF WBC: CPT | Performed by: EMERGENCY MEDICINE

## 2023-10-03 PROCEDURE — 25500020 PHARM REV CODE 255: Performed by: EMERGENCY MEDICINE

## 2023-10-03 PROCEDURE — 81000 URINALYSIS NONAUTO W/SCOPE: CPT | Performed by: EMERGENCY MEDICINE

## 2023-10-03 PROCEDURE — 96375 TX/PRO/DX INJ NEW DRUG ADDON: CPT

## 2023-10-03 PROCEDURE — 80053 COMPREHEN METABOLIC PANEL: CPT | Performed by: EMERGENCY MEDICINE

## 2023-10-03 PROCEDURE — 96374 THER/PROPH/DIAG INJ IV PUSH: CPT

## 2023-10-03 RX ORDER — ONDANSETRON 2 MG/ML
4 INJECTION INTRAMUSCULAR; INTRAVENOUS
Status: COMPLETED | OUTPATIENT
Start: 2023-10-03 | End: 2023-10-03

## 2023-10-03 RX ORDER — MORPHINE SULFATE 4 MG/ML
4 INJECTION, SOLUTION INTRAMUSCULAR; INTRAVENOUS
Status: COMPLETED | OUTPATIENT
Start: 2023-10-03 | End: 2023-10-03

## 2023-10-03 RX ORDER — HYDROCODONE BITARTRATE AND ACETAMINOPHEN 7.5; 325 MG/1; MG/1
1 TABLET ORAL EVERY 6 HOURS PRN
Qty: 15 TABLET | Refills: 0 | Status: SHIPPED | OUTPATIENT
Start: 2023-10-03

## 2023-10-03 RX ADMIN — IOHEXOL 100 ML: 350 INJECTION, SOLUTION INTRAVENOUS at 09:10

## 2023-10-03 RX ADMIN — MORPHINE SULFATE 4 MG: 4 INJECTION INTRAVENOUS at 07:10

## 2023-10-03 RX ADMIN — ONDANSETRON 4 MG: 2 INJECTION INTRAMUSCULAR; INTRAVENOUS at 07:10

## 2023-10-03 NOTE — ED NOTES
Pt reports to ED with c/o left sided rib pain s/t fall at home PTA. Pt reports pain worsens with deep breaths. Pt reports he lost his footing when he slipped and fell landing on left side onto metal step. Pt denies LOC, hitting head, blood thinners.     Adult Physical Assessment  LOC: Hai Gonzales, 58 y.o. male verified via two identifiers. The patient is awake, alert, oriented and speaking appropriately at this time.  APPEARANCE: Patient appears uncomfortable. Patient is clean and well groomed, patient's clothing is properly fastened.  SKIN:The skin is warm and dry, color consistent with ethnicity, patient has normal skin turgor and moist mucus membranes. Abrasion noted to left side.   MUSCULOSKELETAL: Patient moving all extremities well, no obvious swelling or deformities noted. Pt c/o left rib/side pain.   RESPIRATORY: Airway is open and patent, respirations are spontaneous, patient has a normal rate, no accessory muscle use noted. Shallow respirations noted.  CARDIAC: Patient has a normal rate and rhythm, no periphreal edema noted in any extremity, capillary refill < 3 seconds in all extremities  ABDOMEN: Soft and non tender to palpation, no abdominal distention noted. Bowel sounds present in all four quadrants.  NEUROLOGIC: Eyes open spontaneously, behavior appropriate to situation, follows commands, facial expression symmetrical, bilateral hand grasp equal and even, purposeful motor response noted, normal sensation in all extremities when touched with a finger.

## 2023-10-03 NOTE — ED PROVIDER NOTES
"Encounter Date: 10/3/2023       History     Chief Complaint   Patient presents with    Fall     Patient had a trip and fall this morning when he lost his footing. His left side landed on a metal step. He is complaining of left side rib pain that worsens when taking a breath. Denies loss of consciousness, denies head injury, denies neck pain. He has an obvious injury to his left rib area.      The patient is a 58-year-old male who was standing on a metal step and slipped and fell.  He struck his left flank on the edge of metal step.  He has severe pain to his left flank area.  He has increased pain with breathing.  He states he has a history of emphysema and "tuberculosis MAX".  He also struck his left elbow.      Review of patient's allergies indicates:  No Known Allergies  History reviewed. No pertinent past medical history.  History reviewed. No pertinent surgical history.  History reviewed. No pertinent family history.  Social History     Tobacco Use    Smoking status: Every Day     Current packs/day: 0.25     Types: Cigarettes    Smokeless tobacco: Never     Review of Systems   Constitutional:  Negative for fever.   HENT:  Negative for sore throat.    Respiratory:  Negative for shortness of breath.    Cardiovascular:  Negative for chest pain.   Gastrointestinal:  Negative for nausea.   Genitourinary:  Negative for dysuria.   Musculoskeletal:  Negative for back pain.   Skin:  Negative for rash.   Neurological:  Negative for weakness.   Hematological:  Does not bruise/bleed easily.       Physical Exam     Initial Vitals [10/03/23 0623]   BP Pulse Resp Temp SpO2   139/78 90 18 98.1 °F (36.7 °C) 99 %      MAP       --         Physical Exam    Nursing note and vitals reviewed.  Constitutional: He appears well-developed and well-nourished.   HENT:   Head: Normocephalic and atraumatic.   Eyes: EOM are normal. Pupils are equal, round, and reactive to light.   Neck: Neck supple.   Normal range of motion.  Cardiovascular: "  Normal rate, regular rhythm, normal heart sounds and intact distal pulses.           Pulmonary/Chest: He has rhonchi. He exhibits tenderness (Large area of abrasion and ecchymosis to the left flank over posterior ribs and kidney area).   Abdominal: Abdomen is soft. Bowel sounds are normal. He exhibits no distension. There is no abdominal tenderness. There is no rebound and no guarding.   Musculoskeletal:         General: No edema. Normal range of motion.      Cervical back: Normal range of motion and neck supple.     Neurological: He is alert and oriented to person, place, and time.   Skin: Skin is warm and dry.   Psychiatric: He has a normal mood and affect. His behavior is normal. Judgment and thought content normal.         ED Course   Procedures  Labs Reviewed   CBC W/ AUTO DIFFERENTIAL - Abnormal; Notable for the following components:       Result Value    MCH 33.0 (*)     Gran # (ANC) 8.4 (*)     Immature Grans (Abs) 0.06 (*)     Gran % 75.4 (*)     Lymph % 13.4 (*)     All other components within normal limits   COMPREHENSIVE METABOLIC PANEL - Abnormal; Notable for the following components:    Sodium 135 (*)     CO2 19 (*)     All other components within normal limits   URINALYSIS, REFLEX TO URINE CULTURE   URINALYSIS MICROSCOPIC          Imaging Results              CT Chest Abdomen Pelvis With Contrast (xpd) (Final result)  Result time 10/03/23 10:09:20      Final result by John Rice III, MD (10/03/23 10:09:20)                   Impression:      No significant traumatic abnormality seen.    Hiatal hernia.    Small renal cysts.    Mild chronic colitis of the sigmoid colon.    Right gluteal fold sebaceous cyst.    Biapical blebs and biapical pulmonary scar.    Bilateral stable cavitary lung lesions with small pulmonary nodules most likely related to TIMOTEO infection, TB, or granulomas infection.  Malignancy is less likely.      Electronically signed by: John Rice  MD  Date:    10/03/2023  Time:    10:09               Narrative:    EXAMINATION:  CT CHEST ABDOMEN PELVIS WITH CONTRAST (XPD)    CLINICAL HISTORY:  Polytrauma, blunt;    FINDINGS:  Comparison is CT chest dated 09/26/2023.    The arch and great vessels are unremarkable.  No mediastinal, hilar, or axillary adenopathy is seen.  The liver, gallbladder, biliary tree, and spleen show nothing unusual.  There is a hiatal hernia.  The pancreas, adrenal glands, show nothing unusual.  The duodenum is unremarkable.  There are multiple bilateral small renal cysts.  The vessels enhance normally.  No para-aortic, retroperitoneal, or mesenteric adenopathy is seen.  The appendix is normal.  There is mild chronic colitis of the sigmoid colon.  No soft tissue masses are seen.  There is a right gluteal fold small sebaceous cyst.  No soft tissue masses are seen.  No hematoma is seen.  The lungs demonstrate biapical blebs, biapical scar, and multiple stable cavitary lung lesions with multiple lung nodules.  These are unchanged.  The bones demonstrate DJD.                                       X-Ray Ribs 2 View Left (Final result)  Result time 10/03/23 07:34:46      Final result by Tim Carrasco MD (10/03/23 07:34:46)                   Impression:      Suspect persistence of numerous small solid pulmonary nodules, best appreciated on 09/26/2023 CT.  Evolving cavitary lesion in the left mid lung zone.  Dense ovoid opacity in the right mid lung zone measuring up to 33 mm could represent consolidation cavitary lesion in this region on 09/26/2023 CT. Attention on follow-up chest CT could be considered.    Essentially nondisplaced recent appearing fracture of the left posterolateral 8th rib.      Electronically signed by: Tim Carrasco  Date:    10/03/2023  Time:    07:34               Narrative:    EXAMINATION:  XR CHEST 1 VIEW; XR RIBS 2 VIEW LEFT    CLINICAL HISTORY:  r/o bleeding or hemorrhage; Injury, unspecified, initial  encounter    TECHNIQUE:  Single frontal view of the chest was performed.    Two views left ribs.    COMPARISON:  Chest radiograph 08/26/2022.  Chest CT 09/06/2023.    FINDINGS:  Cardiomediastinal contour unchanged within normal limits.    Suspect persistence of numerous small solid pulmonary nodules, best appreciated on 09/26/2023 CT.  Evolving cavitary lesion in the left mid lung zone.  Dense ovoid opacity in the right mid lung zone measuring up to 33 mm could represent consolidation cavitary lesion in this region on 09/26/2023 CT.  Attention on follow-up chest CT could be considered.    No definite pneumothorax or large volume pleural effusion.    No acute findings in the visualized abdomen.    Essentially nondisplaced recent appearing fracture of the left posterolateral 8th rib.                                       X-Ray Chest 1 View (Final result)  Result time 10/03/23 07:34:46      Final result by Tim Carrasco MD (10/03/23 07:34:46)                   Impression:      Suspect persistence of numerous small solid pulmonary nodules, best appreciated on 09/26/2023 CT.  Evolving cavitary lesion in the left mid lung zone.  Dense ovoid opacity in the right mid lung zone measuring up to 33 mm could represent consolidation cavitary lesion in this region on 09/26/2023 CT. Attention on follow-up chest CT could be considered.    Essentially nondisplaced recent appearing fracture of the left posterolateral 8th rib.      Electronically signed by: Tim Carrasco  Date:    10/03/2023  Time:    07:34               Narrative:    EXAMINATION:  XR CHEST 1 VIEW; XR RIBS 2 VIEW LEFT    CLINICAL HISTORY:  r/o bleeding or hemorrhage; Injury, unspecified, initial encounter    TECHNIQUE:  Single frontal view of the chest was performed.    Two views left ribs.    COMPARISON:  Chest radiograph 08/26/2022.  Chest CT 09/06/2023.    FINDINGS:  Cardiomediastinal contour unchanged within normal limits.    Suspect persistence of numerous  small solid pulmonary nodules, best appreciated on 09/26/2023 CT.  Evolving cavitary lesion in the left mid lung zone.  Dense ovoid opacity in the right mid lung zone measuring up to 33 mm could represent consolidation cavitary lesion in this region on 09/26/2023 CT.  Attention on follow-up chest CT could be considered.    No definite pneumothorax or large volume pleural effusion.    No acute findings in the visualized abdomen.    Essentially nondisplaced recent appearing fracture of the left posterolateral 8th rib.                                       Medications   morphine injection 4 mg (4 mg Intravenous Given 10/3/23 0716)   ondansetron injection 4 mg (4 mg Intravenous Given 10/3/23 0717)   iohexoL (OMNIPAQUE 350) injection 100 mL (100 mLs Intravenous Given 10/3/23 0918)     Medical Decision Making  Upon reviewing the patient's chart, the patient has Mycobacterium avium complex.  He has grown multiple different mycobacteria cm in his recent cultures, all non TB.    Differential diagnosis includes rib fractures, pulmonary contusion, pneumothorax, renal hematoma, renal fracture, splenic injury    Medical decision-making:   The patient has a an isolated rib 8 posterior fracture that is nondisplaced.  He has multiple nodules in his lungs consistent with his previous diagnosis of TIMOTEO.  The patient was given pain medications in the emergency department and a prescription was sent to his pharmacy for Norco 7.5.  Was given an incentive spirometer and he refused to try it with the respiratory therapist. [id number] he took it home and will use it as directed.        Amount and/or Complexity of Data Reviewed  External Data Reviewed: labs.  Labs: ordered. Decision-making details documented in ED Course.  Radiology: ordered.    Risk  Prescription drug management.               ED Course as of 10/03/23 1048   Tue Oct 03, 2023   0809 Comprehensive metabolic panel(!) [ST]   0809 CBC auto differential(!) [ST]      ED Course  User Index  [ST] Jolie Tolbert MD                    Clinical Impression:   Final diagnoses:  [T14.90XA] Blunt trauma  [T14.90XA] Injury  [S22.32XA] Closed fracture of one rib of left side, initial encounter (Primary)        ED Disposition Condition    Discharge Stable          ED Prescriptions       Medication Sig Dispense Start Date End Date Auth. Provider    HYDROcodone-acetaminophen (NORCO) 7.5-325 mg per tablet Take 1 tablet by mouth every 6 (six) hours as needed for Pain. 15 tablet 10/3/2023 -- Jolie Tolbert MD          Follow-up Information       Follow up With Specialties Details Why Contact Info    Mary Harris MD Family Medicine Schedule an appointment as soon as possible for a visit  As needed [address] [address] [phone]      [place] - Emergency Dept Emergency Medicine  If symptoms worsen [address]  [facility] [address] [phone]             Jolie Tolbert MD  10/03/23 1048

## 2023-10-05 ENCOUNTER — HOSPITAL ENCOUNTER (EMERGENCY)
Facility: HOSPITAL | Age: 58
Discharge: HOME | End: 2023-10-05
Attending: EMERGENCY MEDICINE
Payer: MEDICAID

## 2023-10-05 VITALS
TEMPERATURE: 98 F | SYSTOLIC BLOOD PRESSURE: 133 MMHG | WEIGHT: 167 LBS | RESPIRATION RATE: 20 BRPM | DIASTOLIC BLOOD PRESSURE: 74 MMHG | HEART RATE: 100 BPM | BODY MASS INDEX: 22.65 KG/M2 | OXYGEN SATURATION: 98 %

## 2023-10-05 DIAGNOSIS — S22.32XD CLOSED FRACTURE OF ONE RIB OF LEFT SIDE WITH ROUTINE HEALING, SUBSEQUENT ENCOUNTER: Primary | ICD-10-CM

## 2023-10-05 LAB — M TB DNA SPEC QL NAA+PROBE: ABNORMAL

## 2023-10-05 PROCEDURE — 99284 EMERGENCY DEPT VISIT MOD MDM: CPT

## 2023-10-05 PROCEDURE — 25000003 PHARM REV CODE 250

## 2023-10-05 PROCEDURE — 96372 THER/PROPH/DIAG INJ SC/IM: CPT

## 2023-10-05 PROCEDURE — 63600175 PHARM REV CODE 636 W HCPCS

## 2023-10-05 RX ORDER — KETOROLAC TROMETHAMINE 30 MG/ML
15 INJECTION, SOLUTION INTRAMUSCULAR; INTRAVENOUS
Status: COMPLETED | OUTPATIENT
Start: 2023-10-05 | End: 2023-10-05

## 2023-10-05 RX ORDER — METHOCARBAMOL 750 MG/1
750 TABLET, FILM COATED ORAL
Status: COMPLETED | OUTPATIENT
Start: 2023-10-05 | End: 2023-10-05

## 2023-10-05 RX ORDER — KETOROLAC TROMETHAMINE 10 MG/1
10 TABLET, FILM COATED ORAL EVERY 6 HOURS
Qty: 20 TABLET | Refills: 0 | Status: SHIPPED | OUTPATIENT
Start: 2023-10-05 | End: 2023-10-10

## 2023-10-05 RX ORDER — METHOCARBAMOL 750 MG/1
750 TABLET, FILM COATED ORAL 4 TIMES DAILY
Qty: 20 TABLET | Refills: 0 | Status: SHIPPED | OUTPATIENT
Start: 2023-10-05 | End: 2023-10-10

## 2023-10-05 RX ADMIN — KETOROLAC TROMETHAMINE 15 MG: 30 INJECTION INTRAMUSCULAR; INTRAVENOUS at 10:10

## 2023-10-05 RX ADMIN — METHOCARBAMOL TABLETS 750 MG: 750 TABLET, COATED ORAL at 10:10

## 2023-10-05 NOTE — ED TRIAGE NOTES
Patient states he fell last Tuesday sustaining a left rib fracture. Rx Norco + Ibuprofen no effective for pain control. Reports pain with movement and cough. Denies hemoptysis or vomiting. Dark contusion noted over left posterior chest. States difficult to take a deep breath due to pain.

## 2023-10-05 NOTE — Clinical Note
"Hai Gacria" Christian was seen and treated in our emergency department on 10/5/2023.  He may return to work on 10/19/2023.       If you have any questions or concerns, please don't hesitate to call.      Penny Merida PA-C"

## 2023-10-05 NOTE — FIRST PROVIDER EVALUATION
Emergency Department TeleTriage Encounter Note      CHIEF COMPLAINT    Chief Complaint   Patient presents with    Rib Injury     Pt dx with left rib fracture on 10/3, no relief from rx norco, pt reports trouble sleeping due to pain        VITAL SIGNS   Initial Vitals [10/05/23 0859]   BP Pulse Resp Temp SpO2   133/74 100 20 98.4 °F (36.9 °C) 98 %      MAP       --            ALLERGIES    Review of patient's allergies indicates:  No Known Allergies    PROVIDER TRIAGE NOTE  This is a teletriage evaluation of a 58 y.o. male presenting to the ED complaining of rib fracture. Patient taking 2 norco every 2-3 hours without relief. He denies new injuries. He reports shortness of breath only with coughing.    Patient is alert and oriented. He speaks in complete sentences. He is sitting upright in the chair in no distress.     Initial orders will be placed and care will be transferred to an alternate provider when patient is roomed for a full evaluation. Any additional orders and the final disposition will be determined by that provider.         ORDERS  Labs Reviewed - No data to display    ED Orders (720h ago, onward)      None              Virtual Visit Note: The provider triage portion of this emergency department evaluation and documentation was performed via [employer], a HIPAA-compliant telemedicine application, in concert with a tele-presenter in the room. A face to face patient evaluation with one of my colleagues will occur once the patient is placed in an emergency department room.      DISCLAIMER: This note was prepared with 2U voice recognition transcription software. Garbled syntax, mangled pronouns, and other bizarre constructions may be attributed to that software system.

## 2023-10-05 NOTE — ED PROVIDER NOTES
Encounter Date: 10/5/2023       History     Chief Complaint   Patient presents with    Rib Injury     Pt dx with left rib fracture on 10/3, no relief from rx norco, pt reports trouble sleeping due to pain      Hai Gonzales is a 58 y.o. male  has no past medical history on file. presenting to the Emergency Department for continued rib pain following ED visit on 10/03/2023.  Patient states he has taken all of the Norco prescription that he was given the day.  He is taking 2 Norco every 2-3 hours.  Patient states that he is unable to sleep secondary to pain.  No new worsening shortness of breath, nausea, vomiting.        The history is provided by the patient.     Review of patient's allergies indicates:  No Known Allergies  No past medical history on file.  No past surgical history on file.  No family history on file.  Social History     Tobacco Use    Smoking status: Every Day     Current packs/day: 0.25     Types: Cigarettes    Smokeless tobacco: Never     Review of Systems   Constitutional:  Negative for fever.   HENT:  Negative for sore throat.    Respiratory:  Negative for shortness of breath.    Cardiovascular:  Negative for chest pain.   Gastrointestinal:  Negative for nausea.   Genitourinary:  Negative for dysuria.   Musculoskeletal:  Negative for back pain.   Skin:  Negative for rash.   Neurological:  Negative for weakness.   Hematological:  Does not bruise/bleed easily.   All other systems reviewed and are negative.      Physical Exam     Initial Vitals [10/05/23 0859]   BP Pulse Resp Temp SpO2   133/74 100 20 98.4 °F (36.9 °C) 98 %      MAP       --         Physical Exam    Nursing note and vitals reviewed.  Constitutional: He appears well-developed and well-nourished. He is not diaphoretic. No distress.   HENT:   Head: Normocephalic and atraumatic.   Right Ear: Hearing and tympanic membrane normal.   Left Ear: Hearing and tympanic membrane normal.   Nose: Nose normal.   Mouth/Throat: Uvula is midline,  oropharynx is clear and moist and mucous membranes are normal.   Eyes: Lids are normal. Pupils are equal, round, and reactive to light.   Cardiovascular:  Normal rate, regular rhythm and normal heart sounds.           Pulmonary/Chest: Effort normal. No respiratory distress. He has no wheezes. He has rhonchi.           Abdominal: Abdomen is soft. There is no abdominal tenderness.   Musculoskeletal:         General: Normal range of motion.      Cervical back: No rigidity.     Neurological: He is oriented to person, place, and time.   Skin: Skin is warm and dry. No rash noted.   Psychiatric: He has a normal mood and affect. His behavior is normal. Judgment and thought content normal.         ED Course   Procedures  Labs Reviewed - No data to display       Imaging Results    None          Medications   ketorolac injection 15 mg (15 mg Intramuscular Given 10/5/23 1039)   methocarbamoL tablet 750 mg (750 mg Oral Given 10/5/23 1039)     Medical Decision Making  Urgent evaluation 58-year-old male following rib fractures on 10/02/2023.  Patient is normotensive, afebrile.  Patient is nontoxic appearing.  Patient looks uncomfortable sitting down.  Patient has no new worsening shortness of breath or nausea or vomiting.  Patient has tenderness over the posterior ribs.  On lung auscultation patient has rhonchi that clear once he coughs.  Heart sounds are normal.    My overall impression is rib fracture. I have considered other differential diagnoses, but at this time do not suspect:  Pneumothorax, PE, flail chest, renal hematoma, renal fracture, splenic injury    ED course: as documented    Discharge Meds/Instructions: toradol, robaxin, primary care follow-up    There does not appear to be any indication for further emergent testing, observation, or hospitalization at this time.  Patient appears stable for and is comfortable with discharge home. The diagnosis, treatment plan, instructions for follow-up as well as ED return  precautions were discussed. Advised to follow-up with PCP for outpatient follow-up in 2-3 days. Signs and symptoms that would warrant immediate return to ED were reviewed prior to discharge. All questions and concerns were asked, answered, and addressed. Patient expressed understanding and agreement with the plan.     This case was discussed with my attending, Dr. Morrell who is in agreement with my assessment and plan.      Risk  Prescription drug management.               ED Course as of 10/05/23 1123   Thu Oct 05, 2023   1030 Patient consulted with Dr. Morrell.  Patient will not be prescribed another narcotic prescription as he took this previous norco prescription outside of directions [LH]   1100 Patient's pain is improved. Will discharge with toradol and robaxin and primary care follow-up [LH]      ED Course User Index  [LH] Penny Merida PA-C                    Clinical Impression:   Final diagnoses:  [S22.32XD] Closed fracture of one rib of left side with routine healing, subsequent encounter (Primary)        ED Disposition Condition    Discharge Stable          ED Prescriptions       Medication Sig Dispense Start Date End Date Auth. Provider    ketorolac (TORADOL) 10 mg tablet Take 1 tablet (10 mg total) by mouth every 6 (six) hours. for 5 days 20 tablet 10/5/2023 10/10/2023 Penny Merida PA-C    methocarbamoL (ROBAXIN) 750 MG Tab Take 1 tablet (750 mg total) by mouth 4 (four) times daily. for 5 days 20 tablet 10/5/2023 10/10/2023 Penyn Merida PA-C          Follow-up Information       Follow up With Specialties Details Why Contact Info    Mary Harris MD Family Medicine Schedule an appointment as soon as possible for a visit in 1 week for follow-up [address], [address] [id number] [phone]               Penny Merida PA-C  10/05/23 4446

## 2023-10-05 NOTE — Clinical Note
"Hai Garcia" Christian was seen and treated in our emergency department on 10/5/2023.  He may return to work on 10/19/2023.       If you have any questions or concerns, please don't hesitate to call.      Penny Merida PA-C"

## 2023-10-18 ENCOUNTER — OFFICE VISIT (OUTPATIENT)
Dept: FAMILY MEDICINE | Facility: HOSPITAL | Age: 58
End: 2023-10-18
Payer: MEDICAID

## 2023-10-18 VITALS
HEIGHT: 72 IN | BODY MASS INDEX: 24.1 KG/M2 | HEART RATE: 100 BPM | DIASTOLIC BLOOD PRESSURE: 75 MMHG | WEIGHT: 177.94 LBS | SYSTOLIC BLOOD PRESSURE: 129 MMHG

## 2023-10-18 DIAGNOSIS — S22.32XD CLOSED FRACTURE OF ONE RIB OF LEFT SIDE WITH ROUTINE HEALING, SUBSEQUENT ENCOUNTER: Primary | ICD-10-CM

## 2023-10-18 LAB — ACID FAST SUSCEPTIBILITY, SLOW GROWER: ABNORMAL

## 2023-10-18 PROCEDURE — 99214 OFFICE O/P EST MOD 30 MIN: CPT

## 2023-10-18 RX ORDER — LIDOCAINE 50 MG/G
1 PATCH TOPICAL DAILY PRN
Qty: 5 PATCH | Refills: 1 | Status: SHIPPED | OUTPATIENT
Start: 2023-10-18

## 2023-10-18 RX ORDER — NAPROXEN 500 MG/1
500 TABLET ORAL 2 TIMES DAILY
Qty: 60 TABLET | Refills: 1 | Status: SHIPPED | OUTPATIENT
Start: 2023-10-18 | End: 2023-12-17

## 2023-10-18 RX ORDER — METHOCARBAMOL 750 MG/1
750 TABLET, FILM COATED ORAL 4 TIMES DAILY PRN
Qty: 120 TABLET | Refills: 1 | Status: SHIPPED | OUTPATIENT
Start: 2023-10-18 | End: 2023-12-17

## 2023-10-18 RX ORDER — DICLOFENAC SODIUM 10 MG/G
4 GEL TOPICAL 4 TIMES DAILY PRN
Qty: 100 G | Refills: 1 | Status: SHIPPED | OUTPATIENT
Start: 2023-10-18

## 2023-10-18 NOTE — PROGRESS NOTES
[facility] NOTE  Follow-up Visit      SUBJECTIVE:     Patient: Hai Gonzales is a 58 y.o. male.    Chief Complaint:   Chief Complaint   Patient presents with    Rib Injury       History of Present Illness:  Patient presents to clinic to follow-up on rib fracture  Fell on metal step about 30 inches high, injuring his left flank on 10/03/2023  Seen in ED and found to have left posterior isolated 8th rib nondisplaced fracture, discharged home on 15 tablets of Norco  Patient went back to ED on 10/05/2023 after using up Viola tablets and subsequently discharged on Toradol and Robaxin  Today patient reports ongoing left-sided pain, although improving    Patient declines tetanus and flu vaccine      ROS  A 10+ review of systems was performed with pertinent positives and negatives noted above in the history of present illness. Other systems were negative unless otherwise specified.    OBJECTIVE:     Vital Signs (Most Recent)  Vitals:    10/18/23 1526   BP: 129/75   Pulse: 100   Weight: 80.7 kg (177 lb 14.6 oz)   Height: 6' (1.829 m)     BMI: Body mass index is 24.13 kg/m².     Physical Exam:  Gen: No apparent distress, cooperative & interactive  CV: RRR, S1 and S2 present, no LE edema  Resp: CTAB, normal respiratory effort   MSK: Left rib with mild tenderness to palpation, good core range of motion    ASSESSMENT:   Hai Gonzales is a 58 y.o. male who presents to clinic to for    1. Closed fracture of one rib of left side with routine healing, subsequent encounter         PLAN:     1. Closed fracture of one rib of left side with routine healing, subsequent encounter  Clinically improving, continue conservative management  - methocarbamoL (ROBAXIN) 750 MG Tab; Take 1 tablet (750 mg total) by mouth 4 (four) times daily as needed.  Dispense: 120 tablet; Refill: 1  - naproxen (NAPROSYN) 500 MG tablet; Take 1 tablet (500 mg total) by mouth 2 (two) times daily.  Dispense: 60 tablet; Refill: 1  - diclofenac sodium  (VOLTAREN) 1 % Gel; Apply 4 g topically 4 (four) times daily as needed.  Dispense: 100 g; Refill: 1  - LIDOcaine (LIDODERM) 5 %; Place 1 patch onto the skin daily as needed. Remove & Discard patch within 12 hours or as directed by MD  Dispense: 5 patch; Refill: 1      Provided patient with anticipatory guidance and return precautions. Treatment plan discussed with patient, all questions answered, and patient acknowledged understanding and verbal agreement.      Follow-up in: 3 months; or sooner PRN if acute concerns arise.      Case discussed with Dr. KAYLA Hoover MD, MPH  [facility] Family Medicine PGY-2  10/18/2023        The following information is provided to all patients.  This information is to help you find resources for any of the problems found today that may be affecting your health:                Living healthy guide: www.[facility].[place].gov       Understanding Diabetes: www.diabetes.org       Eating healthy: www.cdc.gov/healthyweight      [facility] home safety checklist: www.cdc.gov/steadi/patient.html      Agency on Aging: www.goea.[place].gov       Alcoholics anonymous (AA): www.aa.org      Physical Activity: www.angela.nih.gov/wf7fzkf       Tobacco use: www.quitwithusla.org

## 2023-10-26 LAB
ACID FAST MOD KINY STN SPEC: ABNORMAL
MYCOBACTERIUM SPEC QL CULT: ABNORMAL

## 2023-12-04 ENCOUNTER — OFFICE VISIT (OUTPATIENT)
Dept: INFECTIOUS DISEASES | Facility: CLINIC | Age: 58
End: 2023-12-04
Payer: MEDICAID

## 2023-12-04 VITALS
SYSTOLIC BLOOD PRESSURE: 150 MMHG | WEIGHT: 174.81 LBS | DIASTOLIC BLOOD PRESSURE: 80 MMHG | HEART RATE: 115 BPM | HEIGHT: 72 IN | BODY MASS INDEX: 23.68 KG/M2

## 2023-12-04 DIAGNOSIS — A31.0 MYCOBACTERIUM AVIUM-INTRACELLULARE INFECTION: Primary | ICD-10-CM

## 2023-12-04 PROCEDURE — 99999 PR PBB SHADOW E&M-EST. PATIENT-LVL IV: ICD-10-PCS | Mod: PBBFAC,,, | Performed by: INTERNAL MEDICINE

## 2023-12-04 PROCEDURE — 3008F BODY MASS INDEX DOCD: CPT | Mod: CPTII,,, | Performed by: INTERNAL MEDICINE

## 2023-12-04 PROCEDURE — 3008F PR BODY MASS INDEX (BMI) DOCUMENTED: ICD-10-PCS | Mod: CPTII,,, | Performed by: INTERNAL MEDICINE

## 2023-12-04 PROCEDURE — 3077F SYST BP >= 140 MM HG: CPT | Mod: CPTII,,, | Performed by: INTERNAL MEDICINE

## 2023-12-04 PROCEDURE — 1159F MED LIST DOCD IN RCRD: CPT | Mod: CPTII,,, | Performed by: INTERNAL MEDICINE

## 2023-12-04 PROCEDURE — 99999 PR PBB SHADOW E&M-EST. PATIENT-LVL IV: CPT | Mod: PBBFAC,,, | Performed by: INTERNAL MEDICINE

## 2023-12-04 PROCEDURE — 1159F PR MEDICATION LIST DOCUMENTED IN MEDICAL RECORD: ICD-10-PCS | Mod: CPTII,,, | Performed by: INTERNAL MEDICINE

## 2023-12-04 PROCEDURE — 99213 OFFICE O/P EST LOW 20 MIN: CPT | Mod: S$PBB,,, | Performed by: INTERNAL MEDICINE

## 2023-12-04 PROCEDURE — 99213 PR OFFICE/OUTPT VISIT, EST, LEVL III, 20-29 MIN: ICD-10-PCS | Mod: S$PBB,,, | Performed by: INTERNAL MEDICINE

## 2023-12-04 PROCEDURE — 3079F DIAST BP 80-89 MM HG: CPT | Mod: CPTII,,, | Performed by: INTERNAL MEDICINE

## 2023-12-04 PROCEDURE — 99214 OFFICE O/P EST MOD 30 MIN: CPT | Mod: PBBFAC,PN | Performed by: INTERNAL MEDICINE

## 2023-12-04 PROCEDURE — 3079F PR MOST RECENT DIASTOLIC BLOOD PRESSURE 80-89 MM HG: ICD-10-PCS | Mod: CPTII,,, | Performed by: INTERNAL MEDICINE

## 2023-12-04 PROCEDURE — 3077F PR MOST RECENT SYSTOLIC BLOOD PRESSURE >= 140 MM HG: ICD-10-PCS | Mod: CPTII,,, | Performed by: INTERNAL MEDICINE

## 2023-12-04 NOTE — PROGRESS NOTES
Infectious Disease Clinic  Clinic note    Patient Name: Hai Gonzales  YOB: 1965    PRESENTING HISTORY       History of Present Illness:  Mr. Hai Gonzales is a 58 y/o male with tobacco abuse and no other significant history recently presented to [facility] ED with complaints of hemoptysis that started at 0430 day of admission. Endorsed night sweats, chills, intermittent subjective fever, and decreased appetite over the past two months. + intermittent non-bloody diarrheal episodes over the past week.~20lbs weight loss over the past month. He reports smoking history of 1.5 packs daily x 40 years (60 PY). CTA with worsening LLL and RUL bronchiectasis, bronchial thickening, and coalescent cavitation. Cxs were positive for MAC. He was meant to follow up in Non-TB mycobacterium clinic with Dr. Jolley but he states he can not get downtown for appts because of transportation.     1/23 - he started on azithromycin 500mg daily, ethambutol 800mg daily, and rifampin 600mg daily at last visit roughly 3 months ago. No side effects. Mild improvement in symptoms    4/24 - He feels the same. AFB cx from 1/23 with AFB growth. Initially was reported incorrectly. Awaiting amended report    7/24 - Since last visit he has continued his MAC treatment as above. His AFB cxs have been growing various mycobacterium. He initially has 3 cultures with AFB from last year. Then on 1/24/23 he grew mycobacterium kansasii. Then he grew MAC again on 4/18/23. 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 He notes no improvement in symptoms. If anything his cough is worse than before for the last 3 weeks    9/25 -did not get last AFB cx ordered. Symptoms unchanged.     12/4 - he reports improvement in symptoms. Most recent cxs continues to grow mycobacterium. Awaiting final ID    Review of Systems:  Negative except as above    PAST HISTORY:   History reviewed. No pertinent past medical history.    History reviewed. No pertinent surgical  history.    History reviewed. No pertinent family history.    Social History     Socioeconomic History    Marital status: Significant Other   Tobacco Use    Smoking status: Every Day     Current packs/day: 0.25     Types: Cigarettes    Smokeless tobacco: Never       MEDICATIONS & ALLERGIES:     Current Outpatient Medications on File Prior to Visit   Medication Sig    albuterol (PROVENTIL/VENTOLIN HFA) 90 mcg/actuation inhaler Inhale 2 puffs into the lungs every 6 (six) hours as needed for Wheezing. Rescue    albuterol-ipratropium (DUO-NEB) 2.5 mg-0.5 mg/3 mL nebulizer solution Take 3 mLs by nebulization every 6 (six) hours as needed for Wheezing. Rescue    azithromycin (ZITHROMAX) 500 MG tablet Take 1 tablet (500 mg total) by mouth once daily.    diclofenac sodium (VOLTAREN) 1 % Gel Apply 4 g topically 4 (four) times daily as needed.    ethambutoL (MYAMBUTOL) 400 MG Tab Take 3 tablets (1,200 mg total) by mouth once daily.    HYDROcodone-acetaminophen (NORCO) 7.5-325 mg per tablet Take 1 tablet by mouth every 6 (six) hours as needed for Pain.    LIDOcaine (LIDODERM) 5 % 1 patch.    LIDOcaine (LIDODERM) 5 % Place 1 patch onto the skin daily as needed. Remove & Discard patch within 12 hours or as directed by MD    methocarbamoL (ROBAXIN) 750 MG Tab Take 1 tablet (750 mg total) by mouth 4 (four) times daily as needed.    naproxen (NAPROSYN) 500 MG tablet Take 1 tablet (500 mg total) by mouth 2 (two) times daily.    nicotine (NICODERM CQ) 21 mg/24 hr Place 1 patch onto the skin once daily.    pulse oximeter (PULSE OXIMETER) device by Apply Externally route 2 (two) times a day. Use twice daily at 8 AM and 3 PM and record the value in [facility] as directed.    rifAMpin (RIFADIN) 300 MG capsule Take 2 capsules (600 mg total) by mouth once daily.    budesonide-formoterol 160-4.5 mcg (SYMBICORT) 160-4.5 mcg/actuation HFAA Inhale 2 puffs into the lungs every 12 (twelve) hours. Controller     No current facility-administered  medications on file prior to visit.       Review of patient's allergies indicates:  No Known Allergies    OBJECTIVE:   Vital Signs:  Vitals:    12/04/23 0906   BP: (!) 150/80   Pulse: (!) 115   Weight: 79.3 kg (174 lb 13.2 oz)   Height: 6' (1.829 m)       No results found for this or any previous visit (from the past 24 hour(s)).      Physical Exam:   General:  Well developed, well nourished, no acute distress  HEENT:  Normocephalic, atraumatic, EOMI, clear sclera, throat clear without erythema or exudates  CVS:  RRR, S1 and S2 normal, no murmurs, rubs, gallops  Resp:  + rales, rhonchi  GI:  Abdomen soft, non-tender, non-distended, normoactive bowel sounds, no masses  MSK:  No muscle atrophy, peripheral edema, full range of motion  Skin:  No rashes, ulcers, erythema  Neuro:  CNII-XII grossly intact  Psych:  Alert and oriented to person, place, and time    ASSESSMENT:     1. Mycobacterium avium-intracellulare infection  56 y/o male with tobacco abuse and no other significant history recently presented to [facility] ED with complaints of hemoptysis and was found to have cavitary MAC disease. 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

## 2023-12-13 NOTE — PROGRESS NOTES
I assume primary medical responsibility for this patient. I have reviewed the history, physical, and assessement & treatment plan with the resident and agree that the care is reasonable and necessary. This service has been performed by a resident without the presence of a teaching physician under the primary care exception. If necessary, an addendum of additional findings or evaluation beyond the resident documentation will be noted below.     Dannielle Ferguson MD

## 2024-01-22 RX ORDER — ETHAMBUTOL HYDROCHLORIDE 400 MG/1
TABLET, FILM COATED ORAL
Qty: 90 TABLET | Refills: 5 | Status: SHIPPED | OUTPATIENT
Start: 2024-01-22

## 2024-02-01 ENCOUNTER — TELEPHONE (OUTPATIENT)
Dept: FAMILY MEDICINE | Facility: HOSPITAL | Age: 59
End: 2024-02-01
Payer: MEDICAID

## 2024-02-01 NOTE — TELEPHONE ENCOUNTER
----- Message from Na Corado sent at 1/31/2024  4:45 PM CST -----  Regarding: rx for oxygen  Type: Rx For Oxygen     Who Called: pt  Would the patient rather a call back or a response via [facility]? Call  Best Call Back Number: [phone]  Additional Information: pt would like a call back in regards to a rx wasn't sent over to his insurance company for his oxygen from his last visit

## 2024-02-05 ENCOUNTER — TELEPHONE (OUTPATIENT)
Dept: PULMONOLOGY | Facility: CLINIC | Age: 59
End: 2024-02-05
Payer: MEDICAID

## 2024-02-05 NOTE — TELEPHONE ENCOUNTER
----- Message from Paula Carlson sent at 2/5/2024 10:42 AM CST -----  Who Called: Pt    What is the request in detail: Requesting call back to discuss previous walk appt, pt needs oxygen. Needs Rx number. Please advise    Can the clinic reply by [facility]? No    Best Call Back Number: [phone]      Additional Information:

## 2024-02-05 NOTE — TELEPHONE ENCOUNTER
I spoke with Mr Gonzales in regards to scheduling an follow up visit. Patient is scheduled on 4/19/24 at 3:30 PM with Dr Ellerman. Appointment mailed. Patient confirmed and verbalized understanding.

## 2024-02-29 RX ORDER — RIFAMPIN 300 MG/1
CAPSULE ORAL
Qty: 60 CAPSULE | Refills: 5 | Status: SHIPPED | OUTPATIENT
Start: 2024-02-29

## 2024-02-29 RX ORDER — AZITHROMYCIN 500 MG/1
500 TABLET, FILM COATED ORAL
Qty: 30 TABLET | Refills: 5 | Status: SHIPPED | OUTPATIENT
Start: 2024-02-29

## 2024-03-18 ENCOUNTER — OFFICE VISIT (OUTPATIENT)
Dept: INFECTIOUS DISEASES | Facility: CLINIC | Age: 59
End: 2024-03-18
Payer: MEDICAID

## 2024-03-18 ENCOUNTER — LAB VISIT (OUTPATIENT)
Dept: LAB | Facility: HOSPITAL | Age: 59
End: 2024-03-18
Attending: INTERNAL MEDICINE
Payer: MEDICAID

## 2024-03-18 VITALS
WEIGHT: 175.5 LBS | HEART RATE: 96 BPM | TEMPERATURE: 98 F | DIASTOLIC BLOOD PRESSURE: 77 MMHG | HEIGHT: 72 IN | BODY MASS INDEX: 23.77 KG/M2 | SYSTOLIC BLOOD PRESSURE: 132 MMHG

## 2024-03-18 DIAGNOSIS — A31.0 MYCOBACTERIUM AVIUM-INTRACELLULARE INFECTION: Primary | ICD-10-CM

## 2024-03-18 DIAGNOSIS — A31.0 MYCOBACTERIUM AVIUM-INTRACELLULARE INFECTION: ICD-10-CM

## 2024-03-18 LAB
ALBUMIN SERPL BCP-MCNC: 4 G/DL (ref 3.5–5.2)
ALP SERPL-CCNC: 91 U/L (ref 55–135)
ALT SERPL W/O P-5'-P-CCNC: 15 U/L (ref 10–44)
ANION GAP SERPL CALC-SCNC: 11 MMOL/L (ref 8–16)
AST SERPL-CCNC: 19 U/L (ref 10–40)
BILIRUB SERPL-MCNC: 0.3 MG/DL (ref 0.1–1)
BUN SERPL-MCNC: 7 MG/DL (ref 6–20)
CALCIUM SERPL-MCNC: 9.3 MG/DL (ref 8.7–10.5)
CHLORIDE SERPL-SCNC: 97 MMOL/L (ref 95–110)
CO2 SERPL-SCNC: 23 MMOL/L (ref 23–29)
CREAT SERPL-MCNC: 0.7 MG/DL (ref 0.5–1.4)
EST. GFR  (NO RACE VARIABLE): >60 ML/MIN/1.73 M^2
GLUCOSE SERPL-MCNC: 77 MG/DL (ref 70–110)
POTASSIUM SERPL-SCNC: 4.3 MMOL/L (ref 3.5–5.1)
PROT SERPL-MCNC: 7.8 G/DL (ref 6–8.4)
SODIUM SERPL-SCNC: 131 MMOL/L (ref 136–145)

## 2024-03-18 PROCEDURE — 99213 OFFICE O/P EST LOW 20 MIN: CPT | Mod: S$PBB,,, | Performed by: INTERNAL MEDICINE

## 2024-03-18 PROCEDURE — 3075F SYST BP GE 130 - 139MM HG: CPT | Mod: CPTII,,, | Performed by: INTERNAL MEDICINE

## 2024-03-18 PROCEDURE — 87118 MYCOBACTERIC IDENTIFICATION: CPT | Mod: 59 | Performed by: INTERNAL MEDICINE

## 2024-03-18 PROCEDURE — 87116 MYCOBACTERIA CULTURE: CPT | Performed by: INTERNAL MEDICINE

## 2024-03-18 PROCEDURE — 87206 SMEAR FLUORESCENT/ACID STAI: CPT | Performed by: INTERNAL MEDICINE

## 2024-03-18 PROCEDURE — 99999 PR PBB SHADOW E&M-EST. PATIENT-LVL IV: CPT | Mod: PBBFAC,,, | Performed by: INTERNAL MEDICINE

## 2024-03-18 PROCEDURE — 99214 OFFICE O/P EST MOD 30 MIN: CPT | Mod: PBBFAC,PN | Performed by: INTERNAL MEDICINE

## 2024-03-18 PROCEDURE — 36415 COLL VENOUS BLD VENIPUNCTURE: CPT | Performed by: INTERNAL MEDICINE

## 2024-03-18 PROCEDURE — 1159F MED LIST DOCD IN RCRD: CPT | Mod: CPTII,,, | Performed by: INTERNAL MEDICINE

## 2024-03-18 PROCEDURE — 87015 SPECIMEN INFECT AGNT CONCNTJ: CPT | Performed by: INTERNAL MEDICINE

## 2024-03-18 PROCEDURE — 3008F BODY MASS INDEX DOCD: CPT | Mod: CPTII,,, | Performed by: INTERNAL MEDICINE

## 2024-03-18 PROCEDURE — 80053 COMPREHEN METABOLIC PANEL: CPT | Performed by: INTERNAL MEDICINE

## 2024-03-18 PROCEDURE — 3078F DIAST BP <80 MM HG: CPT | Mod: CPTII,,, | Performed by: INTERNAL MEDICINE

## 2024-03-18 NOTE — PROGRESS NOTES
Infectious Disease Clinic  Clinic note    Patient Name: Hai Gonzales  YOB: 1965    PRESENTING HISTORY       History of Present Illness:  Mr. Hai Gonzales is a 59 y/o male with tobacco abuse and no other significant history recently presented to [facility] ED with complaints of hemoptysis that started at 0430 day of admission. Endorsed night sweats, chills, intermittent subjective fever, and decreased appetite over the past two months. + intermittent non-bloody diarrheal episodes over the past week.~20lbs weight loss over the past month. He reports smoking history of 1.5 packs daily x 40 years (60 PY). CTA with worsening LLL and RUL bronchiectasis, bronchial thickening, and coalescent cavitation. Cxs were positive for MAC. He was meant to follow up in Non-TB mycobacterium clinic with Dr. Jolley but he states he can not get downtown for appts because of transportation.     1/23 - he started on azithromycin 500mg daily, ethambutol 800mg daily, and rifampin 600mg daily at last visit roughly 3 months ago. No side effects. Mild improvement in symptoms    4/24 - He feels the same. AFB cx from 1/23 with AFB growth. Initially was reported incorrectly. Awaiting amended report    7/24 - Since last visit he has continued his MAC treatment as above. His AFB cxs have been growing various mycobacterium. He initially has 3 cultures with AFB from last year. Then on 1/24/23 he grew mycobacterium kansasii. Then he grew MAC again on 4/18/23. 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 He notes no improvement in symptoms. If anything his cough is worse than before for the last 3 weeks    9/25 -did not get last AFB cx ordered. Symptoms unchanged.     12/4 - he reports improvement in symptoms. Most recent cxs continues to grow mycobacterium. Awaiting final ID    3/19 -He briefly stopped taking his azithromycin 500mg daily, ethambutol 800mg daily, and rifampin 600mg daily but started it again due to feeling  poorly    Review of Systems:  Negative except as above    PAST HISTORY:   No past medical history on file.    No past surgical history on file.    No family history on file.    Social History     Socioeconomic History    Marital status: Significant Other   Tobacco Use    Smoking status: Every Day     Current packs/day: 0.25     Types: Cigarettes    Smokeless tobacco: Never       MEDICATIONS & ALLERGIES:     Current Outpatient Medications on File Prior to Visit   Medication Sig    albuterol (PROVENTIL/VENTOLIN HFA) 90 mcg/actuation inhaler Inhale 2 puffs into the lungs every 6 (six) hours as needed for Wheezing. Rescue    albuterol-ipratropium (DUO-NEB) 2.5 mg-0.5 mg/3 mL nebulizer solution Take 3 mLs by nebulization every 6 (six) hours as needed for Wheezing. Rescue    azithromycin (ZITHROMAX) 500 MG tablet TAKE 1 TABLET(500 MG) BY MOUTH EVERY DAY    budesonide-formoterol 160-4.5 mcg (SYMBICORT) 160-4.5 mcg/actuation HFAA Inhale 2 puffs into the lungs every 12 (twelve) hours. Controller    diclofenac sodium (VOLTAREN) 1 % Gel Apply 4 g topically 4 (four) times daily as needed.    ethambutoL (MYAMBUTOL) 400 MG Tab TAKE 3 TABLETS(1200 MG) BY MOUTH EVERY DAY    rifAMpin (RIFADIN) 300 MG capsule TAKE 2 CAPSULES(600 MG) BY MOUTH EVERY DAY    HYDROcodone-acetaminophen (NORCO) 7.5-325 mg per tablet Take 1 tablet by mouth every 6 (six) hours as needed for Pain.    LIDOcaine (LIDODERM) 5 % 1 patch.    LIDOcaine (LIDODERM) 5 % Place 1 patch onto the skin daily as needed. Remove & Discard patch within 12 hours or as directed by MD (Patient not taking: Reported on 3/18/2024)    nicotine (NICODERM CQ) 21 mg/24 hr Place 1 patch onto the skin once daily. (Patient not taking: Reported on 3/18/2024)    pulse oximeter (PULSE OXIMETER) device by Apply Externally route 2 (two) times a day. Use twice daily at 8 AM and 3 PM and record the value in MyChart as directed. (Patient not taking: Reported on 3/18/2024)     No current  facility-administered medications on file prior to visit.       Review of patient's allergies indicates:  No Known Allergies    OBJECTIVE:   Vital Signs:  Vitals:    03/18/24 0903   BP: 132/77   Pulse: 96   Temp: 97.6 °F (36.4 °C)   TempSrc: Oral   Weight: 79.6 kg (175 lb 7.8 oz)   Height: 6' (1.829 m)       No results found for this or any previous visit (from the past 24 hour(s)).      Physical Exam:   General:  Well developed, well nourished, no acute distress  HEENT:  Normocephalic, atraumatic, EOMI, clear sclera, throat clear without erythema or exudates  CVS:  RRR, S1 and S2 normal, no murmurs, rubs, gallops  Resp:  + rales, rhonchi  GI:  Abdomen soft, non-tender, non-distended, normoactive bowel sounds, no masses  MSK:  No muscle atrophy, peripheral edema, full range of motion  Skin:  No rashes, ulcers, erythema  Neuro:  CNII-XII grossly intact  Psych:  Alert and oriented to person, place, and time    ASSESSMENT:     1. Mycobacterium avium-intracellulare infection  59 y/o male with tobacco abuse and no other significant history recently presented to [facility] ED with complaints of hemoptysis and was found to have cavitary MAC disease. He refused to go to non-tb mycobacterium clinic due to transportation issues and was started on MAC therapy here       PLAN:   -negative HIV test last year  -will continue azithromycin 500mg daily, ethambutol daily 1200mg daily, and rifampin 600mg daily despite over 1 year of therapy without clearing cxs  -mycobacterium kansasii on 1/24  -mycobacterium parascrofulaceum on 5/1  -will get follow up CT   -will check AFB sputum again  -RTC 3 months

## 2024-03-19 DIAGNOSIS — J43.8 OTHER EMPHYSEMA: ICD-10-CM

## 2024-03-19 DIAGNOSIS — A31.0 MYCOBACTERIUM AVIUM-INTRACELLULARE COMPLEX: ICD-10-CM

## 2024-03-19 RX ORDER — BUDESONIDE AND FORMOTEROL FUMARATE DIHYDRATE 160; 4.5 UG/1; UG/1
2 AEROSOL RESPIRATORY (INHALATION) EVERY 12 HOURS
Qty: 6 G | Refills: 6 | Status: SHIPPED | OUTPATIENT
Start: 2024-03-19 | End: 2024-04-19 | Stop reason: SDUPTHER

## 2024-04-17 DIAGNOSIS — J43.8 OTHER EMPHYSEMA: Primary | ICD-10-CM

## 2024-04-17 DIAGNOSIS — A31.0 MYCOBACTERIUM AVIUM-INTRACELLULARE COMPLEX: ICD-10-CM

## 2024-04-19 ENCOUNTER — OFFICE VISIT (OUTPATIENT)
Dept: PULMONOLOGY | Facility: CLINIC | Age: 59
End: 2024-04-19
Payer: MEDICAID

## 2024-04-19 ENCOUNTER — HOSPITAL ENCOUNTER (OUTPATIENT)
Dept: PULMONOLOGY | Facility: CLINIC | Age: 59
Discharge: HOME | End: 2024-04-19
Payer: MEDICAID

## 2024-04-19 VITALS
OXYGEN SATURATION: 95 % | DIASTOLIC BLOOD PRESSURE: 82 MMHG | BODY MASS INDEX: 23.7 KG/M2 | SYSTOLIC BLOOD PRESSURE: 138 MMHG | WEIGHT: 175 LBS | HEIGHT: 72 IN | HEART RATE: 105 BPM | HEIGHT: 72 IN | WEIGHT: 175 LBS | BODY MASS INDEX: 23.7 KG/M2

## 2024-04-19 DIAGNOSIS — J43.8 OTHER EMPHYSEMA: Primary | ICD-10-CM

## 2024-04-19 DIAGNOSIS — F17.200 NICOTINE DEPENDENCE, UNCOMPLICATED, UNSPECIFIED NICOTINE PRODUCT TYPE: ICD-10-CM

## 2024-04-19 DIAGNOSIS — J43.8 OTHER EMPHYSEMA: ICD-10-CM

## 2024-04-19 DIAGNOSIS — A31.0 MYCOBACTERIUM AVIUM-INTRACELLULARE COMPLEX: ICD-10-CM

## 2024-04-19 PROCEDURE — 3079F DIAST BP 80-89 MM HG: CPT | Mod: CPTII,,, | Performed by: INTERNAL MEDICINE

## 2024-04-19 PROCEDURE — 3075F SYST BP GE 130 - 139MM HG: CPT | Mod: CPTII,,, | Performed by: INTERNAL MEDICINE

## 2024-04-19 PROCEDURE — 99999 PR PBB SHADOW E&M-EST. PATIENT-LVL III: CPT | Mod: PBBFAC,,, | Performed by: SURGERY

## 2024-04-19 PROCEDURE — 94618 PULMONARY STRESS TESTING: CPT | Mod: 26,S$PBB,, | Performed by: INTERNAL MEDICINE

## 2024-04-19 PROCEDURE — 99213 OFFICE O/P EST LOW 20 MIN: CPT | Mod: PBBFAC,25 | Performed by: SURGERY

## 2024-04-19 PROCEDURE — 3008F BODY MASS INDEX DOCD: CPT | Mod: CPTII,,, | Performed by: INTERNAL MEDICINE

## 2024-04-19 PROCEDURE — 94618 PULMONARY STRESS TESTING: CPT | Mod: PBBFAC | Performed by: INTERNAL MEDICINE

## 2024-04-19 PROCEDURE — 99214 OFFICE O/P EST MOD 30 MIN: CPT | Mod: S$PBB,25,, | Performed by: INTERNAL MEDICINE

## 2024-04-19 PROCEDURE — 1159F MED LIST DOCD IN RCRD: CPT | Mod: CPTII,,, | Performed by: INTERNAL MEDICINE

## 2024-04-19 RX ORDER — IPRATROPIUM BROMIDE AND ALBUTEROL SULFATE 2.5; .5 MG/3ML; MG/3ML
3 SOLUTION RESPIRATORY (INHALATION) EVERY 6 HOURS PRN
Qty: 30 EACH | Refills: 5 | Status: SHIPPED | OUTPATIENT
Start: 2024-04-19 | End: 2025-04-19

## 2024-04-19 RX ORDER — BUDESONIDE AND FORMOTEROL FUMARATE DIHYDRATE 160; 4.5 UG/1; UG/1
2 AEROSOL RESPIRATORY (INHALATION) EVERY 12 HOURS
Qty: 6 G | Refills: 6 | Status: SHIPPED | OUTPATIENT
Start: 2024-04-19 | End: 2025-04-19

## 2024-04-19 RX ORDER — ALBUTEROL SULFATE 90 UG/1
2 AEROSOL, METERED RESPIRATORY (INHALATION) EVERY 6 HOURS PRN
Qty: 18 G | Refills: 11 | Status: SHIPPED | OUTPATIENT
Start: 2024-04-19 | End: 2025-04-19

## 2024-04-19 RX ORDER — VARENICLINE TARTRATE 0.5 (11)-1
KIT ORAL
Qty: 1 EACH | Refills: 0 | Status: SHIPPED | OUTPATIENT
Start: 2024-04-19

## 2024-04-19 RX ORDER — VARENICLINE TARTRATE 1 MG/1
1 TABLET, FILM COATED ORAL 2 TIMES DAILY
Qty: 180 TABLET | Refills: 0 | Status: SHIPPED | OUTPATIENT
Start: 2024-04-19

## 2024-04-19 NOTE — PROCEDURES
Hai Gonzales is a 59 y.o.  male patient, who presents for a 6 minute walk test ordered by Ellerman, MD.  The diagnosis is COPD/Emphysema; Oxygen Qualification.  The patient's BMI is 23.7 kg/m2.  Predicted distance (lower limit of normal) is 444.58 meters.      Test Results:    The test was not completed due to shortness of breath.  The patient stopped 1 time for a total of 86 seconds.  The total time walked was 274 seconds.  During walking, the patient reported:  Dyspnea.  The patient used no assistive devices during testing.     04/19/2024---------Distance: 304.8 meters (1000 feet)     O2 Sat % Supplemental Oxygen Heart Rate Blood Pressure Nasir Scale   Pre-exercise  (Resting) 96 % Room Air 113 bpm 137/84 mmHg 3   During Exercise 97 % Room Air 136 bpm 138/82 mmHg 7-8   Post-exercise  (Recovery) 97 % Room Air  120 bpm       Recovery Time: 58 seconds    Performing nurse/tech: Estopinal RRT      PREVIOUS STUDY at [facility] [place] [place] on 9/27/2022:      09/27/2022---------Distance:   293 meters       O2 Sat % Supplemental Oxygen Heart Rate Blood Pressure Nasir Scale   Pre-exercise  (Resting)  97 Room Air 108 142/78 3   During Exercise 93 Room Air 118 N/A 8   Post-exercise  (Recovery) 97 Room Air 102 137/71 4       CLINICAL INTERPRETATION:  Six minute walk distance is 304.8 meters (1000 feet) with very heavy dyspnea.  During exercise, there was no desaturation while breathing room air.  Blood pressure remained stable and Heart rate increased significantly with walking.  Tachycardia was present prior to exercise.  The patient did not report non-pulmonary symptoms during exercise.  The patient did not complete the study, walking 274 seconds of the 360 second test.  Since the previous study in September 2022, exercise capacity is unchanged.  Based upon age and body mass index, exercise capacity is less than predicted.

## 2024-04-19 NOTE — PROGRESS NOTES
Subjective:      Patient ID: Hai Gonzales is a 59 y.o. male.    Chief Complaint: No chief complaint on file.     58 yo with no significant previous medical history that presented to the hospital with hemoptysis, weight loss, night sweats, and difficulty breathing and was subsequently found to have a larger cavitary lung lesion with smaller satellite cavitation lesions. He was admitted and sputum samples obtained to rule out TB. His sputum samples all returned 3/3 + for MAC. Has been following with ID since lat 2022 for MAC treatment without clearance. He continues to have significant sputum production daily, more in the morning and continues to smoke regularly. He was using oxygen at home when he gets short of breath and made this appointment to get his oxygen renewed. Unfortunately, he did not qualify during this visit without significant desaturations.      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 Negative for hemoptysis and wheezing.    All other systems reviewed and are negative.    Objective:     Physical Exam   Constitutional: He is oriented to person, place, and time. He appears well-developed. No distress. He is not obese.   Cardiovascular: Normal rate, regular rhythm and normal heart sounds.   Pulmonary/Chest: Normal expansion and symmetric chest wall expansion. No stridor. He has decreased breath sounds. He has no wheezes. He has no rhonchi. He has no rales.    Musculoskeletal:         General: No edema.   Neurological: He is alert and oriented to person, place, and time.   Skin: Skin is warm and dry.   Psychiatric: He has a normal mood and affect. His behavior is normal.   Nursing note and vitals reviewed.    Personal Diagnostic Review        3/18/2024     9:03 AM 12/4/2023     9:06 AM 10/18/2023     3:26 PM 10/5/2023     8:59 AM 10/3/2023    10:24 AM 10/3/2023     6:23 AM 9/25/2023     8:56 AM   Pulmonary Function Tests   SpO2    98 % 98 % 99 %    Height 6' (1.829 m) 6' (1.829 m) 6' (1.829 m)    6' (1.829 m)   Weight 79.6 kg (175 lb 7.8 oz) 79.3 kg (174 lb 13.2 oz) 80.7 kg (177 lb 14.6 oz) 75.8 kg (167 lb)  74.8 kg (165 lb) 77.3 kg (170 lb 8.4 oz)   BMI (Calculated) 23.8 23.7 24.1    23.1     CT Chest 10/3: B stable cavitary lung disease     AFB Smear   9/26/23 +MAC Negative   7/26/23 +MAC Negative   5/1/23 +M. Parascrofulaceum  Negative   4/18/23 +MAC Negative   1/24/23 +M. kansasii Negative   8/16/22 +MAC Positive     04/19/2024---------Distance: 304.8 meters (1000 feet)       O2 Sat % Supplemental Oxygen Heart Rate Blood Pressure Nasir Scale   Pre-exercise  (Resting) 96 % Room Air 113 bpm 137/84 mmHg 3   During Exercise 97 % Room Air 136 bpm 138/82 mmHg 7-8   Post-exercise  (Recovery) 97 % Room Air  120 bpm          Recovery Time: 58 seconds     Performing nurse/tech: Estopinal RRT        PREVIOUS STUDY at [facility] [place] on 9/27/2022:       09/27/2022---------Distance:   293 meters       O2 Sat % Supplemental Oxygen Heart Rate Blood Pressure Nasir Scale   Pre-exercise  (Resting)  97 Room Air 108 142/78 3   During Exercise 93 Room Air 118 N/A 8   Post-exercise  (Recovery) 97 Room Air 102 137/71 4     \PFT 2022: [id number] 50, FEV1 32%, DLCO 47%     Assessment:     No diagnosis found.    No orders of the defined types were placed in this encounter.    Plan:     Problem List Items Addressed This Visit          Pulmonary    Other emphysema - Primary    Current Assessment & Plan      Patient with emphysema and persistent smoking. He would like a refill on his medicaitons at this time. Currently not on a LAMA for triple therapy but should get this at his next visit. We will work on smoking cessation only adding Chantix at this visit. No recent hospitalizations with a very high functional status.  - refill all medications  - offer LAMA at next visit  - continue smoking cessation         Relevant Medications    albuterol (PROVENTIL/VENTOLIN HFA) 90 mcg/actuation inhaler    budesonide-formoterol 160-4.5 mcg (SYMBICORT) 160-4.5 mcg/actuation HFAA    albuterol-ipratropium (DUO-NEB) 2.5 mg-0.5 mg/3 mL nebulizer solution    varenicline (CHANTIX STARTING MONTH BOX) 0.5 mg (11)- 1 mg (42) tablet    varenicline (CHANTIX) 1 mg Tab       ID    Mycobacterium avium-intracellulare complex    Current Assessment & Plan     Continue follow-up with ID (Dr. Henao). Not currently on daily airway clearance therapy at this time and declined when offered. I think that in the future, he will likely need this if he continues to have persistent colonization/worsening bronchiectasis.   - ID follow-up as scheduled         Relevant Medications    budesonide-formoterol 160-4.5 mcg (SYMBICORT) 160-4.5 mcg/actuation HFAA       Other    Nicotine dependence    Current Assessment & Plan     Dangers of cigarette smoking were reviewed with patient in detail. Patient was Counseled for 10 minutes or greater. Nicotine replacement options were discussed. Nicotine replacement was discussed- not prescribed per patient's request  - started Chantix w/ educations on side effects  - continue smoking cessation encouragement

## 2024-04-25 NOTE — ASSESSMENT & PLAN NOTE
Dangers of cigarette smoking were reviewed with patient in detail. Patient was Counseled for 10 minutes or greater. Nicotine replacement options were discussed. Nicotine replacement was discussed- not prescribed per patient's request  - started Chantix w/ educations on side effects  - continue smoking cessation encouragement

## 2024-04-25 NOTE — ASSESSMENT & PLAN NOTE
Continue follow-up with ID (Dr. Henao). Not currently on daily airway clearance therapy at this time and declined when offered. I think that in the future, he will likely need this if he continues to have persistent colonization/worsening bronchiectasis.   - ID follow-up as scheduled

## 2024-04-25 NOTE — ASSESSMENT & PLAN NOTE
Patient with emphysema and persistent smoking. He would like a refill on his medicaitons at this time. Currently not on a LAMA for triple therapy but should get this at his next visit. We will work on smoking cessation only adding Chantix at this visit. No recent hospitalizations with a very high functional status.  - refill all medications  - offer LAMA at next visit  - continue smoking cessation

## 2024-04-26 ENCOUNTER — HOSPITAL ENCOUNTER (OUTPATIENT)
Dept: RADIOLOGY | Facility: HOSPITAL | Age: 59
Discharge: HOME | End: 2024-04-26
Attending: INTERNAL MEDICINE
Payer: MEDICAID

## 2024-04-26 DIAGNOSIS — A31.0 MYCOBACTERIUM AVIUM-INTRACELLULARE INFECTION: ICD-10-CM

## 2024-04-26 PROCEDURE — 71260 CT THORAX DX C+: CPT | Mod: TC

## 2024-04-26 PROCEDURE — 71260 CT THORAX DX C+: CPT | Mod: 26,,, | Performed by: INTERNAL MEDICINE

## 2024-04-26 PROCEDURE — 25500020 PHARM REV CODE 255: Performed by: INTERNAL MEDICINE

## 2024-04-26 RX ADMIN — IOHEXOL 75 ML: 350 INJECTION, SOLUTION INTRAVENOUS at 09:04

## 2024-05-13 ENCOUNTER — OFFICE VISIT (OUTPATIENT)
Dept: INFECTIOUS DISEASES | Facility: CLINIC | Age: 59
End: 2024-05-13
Payer: MEDICAID

## 2024-05-13 VITALS
SYSTOLIC BLOOD PRESSURE: 134 MMHG | DIASTOLIC BLOOD PRESSURE: 80 MMHG | BODY MASS INDEX: 23.7 KG/M2 | HEART RATE: 108 BPM | WEIGHT: 174.69 LBS

## 2024-05-13 DIAGNOSIS — A31.0 MYCOBACTERIUM AVIUM-INTRACELLULARE INFECTION: Primary | ICD-10-CM

## 2024-05-13 PROCEDURE — 99214 OFFICE O/P EST MOD 30 MIN: CPT | Mod: PBBFAC,PN | Performed by: INTERNAL MEDICINE

## 2024-05-13 PROCEDURE — 99999 PR PBB SHADOW E&M-EST. PATIENT-LVL IV: CPT | Mod: PBBFAC,,, | Performed by: INTERNAL MEDICINE

## 2024-05-13 PROCEDURE — 99213 OFFICE O/P EST LOW 20 MIN: CPT | Mod: S$PBB,,, | Performed by: INTERNAL MEDICINE

## 2024-05-13 RX ORDER — BENZONATATE 100 MG/1
100 CAPSULE ORAL 3 TIMES DAILY PRN
Qty: 90 CAPSULE | Refills: 0 | Status: SHIPPED | OUTPATIENT
Start: 2024-05-13 | End: 2024-06-12

## 2024-05-13 NOTE — PROGRESS NOTES
Infectious Disease Clinic  Clinic note    Patient Name: Hai Gonzales  YOB: 1965    PRESENTING HISTORY       History of Present Illness:  Mr. Hai Gonzales is a 57 y/o male with tobacco abuse and no other significant history recently presented to [facility] ED with complaints of hemoptysis that started at 0430 day of admission. Endorsed night sweats, chills, intermittent subjective fever, and decreased appetite over the past two months. + intermittent non-bloody diarrheal episodes over the past week.~20lbs weight loss over the past month. He reports smoking history of 1.5 packs daily x 40 years (60 PY). CTA with worsening LLL and RUL bronchiectasis, bronchial thickening, and coalescent cavitation. Cxs were positive for MAC. He was meant to follow up in Non-TB mycobacterium clinic with Dr. Jolley but he states he can not get downtown for appts because of transportation.     1/23 - he started on azithromycin 500mg daily, ethambutol 800mg daily, and rifampin 600mg daily at last visit roughly 3 months ago. No side effects. Mild improvement in symptoms    4/24 - He feels the same. AFB cx from 1/23 with AFB growth. Initially was reported incorrectly. Awaiting amended report    7/24 - Since last visit he has continued his MAC treatment as above. His AFB cxs have been growing various mycobacterium. He initially has 3 cultures with AFB from last year. Then on 1/24/23 he grew mycobacterium kansasii. Then he grew MAC again on 4/18/23. 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 He notes no improvement in symptoms. If anything his cough is worse than before for the last 3 weeks    9/25 -did not get last AFB cx ordered. Symptoms unchanged.     12/4 - he reports improvement in symptoms. Most recent cxs continues to grow mycobacterium. Awaiting final ID    3/19 -He briefly stopped taking his azithromycin 500mg daily, ethambutol 800mg daily, and rifampin 600mg daily but started it again due to feeling poorly    5/13  - He continues to smoke. Was seen by pulm. He states that he can't get downtown to see the non-TB mycobacterium clinic. Still with unchanged symptoms and last CT was not improved    Review of Systems:  Negative except as above    PAST HISTORY:   No past medical history on file.    No past surgical history on file.    No family history on file.    Social History     Socioeconomic History    Marital status: Significant Other   Tobacco Use    Smoking status: Every Day     Current packs/day: 0.25     Types: Cigarettes    Smokeless tobacco: Never       MEDICATIONS & ALLERGIES:     Current Outpatient Medications on File Prior to Visit   Medication Sig    albuterol (PROVENTIL/VENTOLIN HFA) 90 mcg/actuation inhaler Inhale 2 puffs into the lungs every 6 (six) hours as needed for Wheezing. Rescue    albuterol-ipratropium (DUO-NEB) 2.5 mg-0.5 mg/3 mL nebulizer solution Take 3 mLs by nebulization every 6 (six) hours as needed for Wheezing. Rescue    azithromycin (ZITHROMAX) 500 MG tablet TAKE 1 TABLET(500 MG) BY MOUTH EVERY DAY    budesonide-formoterol 160-4.5 mcg (SYMBICORT) 160-4.5 mcg/actuation HFAA Inhale 2 puffs into the lungs every 12 (twelve) hours. Controller    diclofenac sodium (VOLTAREN) 1 % Gel Apply 4 g topically 4 (four) times daily as needed.    ethambutoL (MYAMBUTOL) 400 MG Tab TAKE 3 TABLETS(1200 MG) BY MOUTH EVERY DAY    LIDOcaine (LIDODERM) 5 % 1 patch.    LIDOcaine (LIDODERM) 5 % Place 1 patch onto the skin daily as needed. Remove & Discard patch within 12 hours or as directed by MD    nicotine (NICODERM CQ) 21 mg/24 hr Place 1 patch onto the skin once daily.    pulse oximeter (PULSE OXIMETER) device by Apply Externally route 2 (two) times a day. Use twice daily at 8 AM and 3 PM and record the value in MyChart as directed.    rifAMpin (RIFADIN) 300 MG capsule TAKE 2 CAPSULES(600 MG) BY MOUTH EVERY DAY    varenicline (CHANTIX STARTING MONTH BOX) 0.5 mg (11)- 1 mg (42) tablet Take one 0.5mg tab by mouth once  daily X3 days,then increase to one 0.5mg tab twice daily X4 days,then increase to one 1mg tab twice daily    varenicline (CHANTIX) 1 mg Tab Take 1 tablet (1 mg total) by mouth 2 (two) times daily.    HYDROcodone-acetaminophen (NORCO) 7.5-325 mg per tablet Take 1 tablet by mouth every 6 (six) hours as needed for Pain. (Patient not taking: Reported on 5/13/2024)     No current facility-administered medications on file prior to visit.       Review of patient's allergies indicates:  No Known Allergies    OBJECTIVE:   Vital Signs:  Vitals:    05/13/24 0837   BP: 134/80   Pulse: 108   Weight: 79.2 kg (174 lb 11.4 oz)       No results found for this or any previous visit (from the past 24 hour(s)).      Physical Exam:   General:  Well developed, well nourished, no acute distress  HEENT:  Normocephalic, atraumatic, EOMI, clear sclera, throat clear without erythema or exudates  CVS:  RRR, S1 and S2 normal, no murmurs, rubs, gallops  Resp:  + rales, rhonchi  GI:  Abdomen soft, non-tender, non-distended, normoactive bowel sounds, no masses  MSK:  No muscle atrophy, peripheral edema, full range of motion  Skin:  No rashes, ulcers, erythema  Neuro:  CNII-XII grossly intact  Psych:  Alert and oriented to person, place, and time    ASSESSMENT:     1. Mycobacterium avium-intracellulare infection  59 y/o male with tobacco abuse and no other significant history recently presented to [facility] ED with complaints of hemoptysis and was found to have cavitary MAC disease. 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

## 2024-05-14 ENCOUNTER — TELEPHONE (OUTPATIENT)
Dept: INFECTIOUS DISEASES | Facility: CLINIC | Age: 59
End: 2024-05-14
Payer: COMMERCIAL

## 2024-05-14 NOTE — TELEPHONE ENCOUNTER
Spoke with pt. Pt states he went to turn in his sample to the lab and they stated there was no work order placed for it. Informed pt I will let Dr Henao know and get back to the pt once I hear back from Dr. Henao.

## 2024-05-14 NOTE — TELEPHONE ENCOUNTER
----- Message from Autumn Ingram sent at 5/14/2024 11:30 AM CDT -----  Type:  Call back     Who Called:pt     Does the patient know what this is regarding?:Barcode for sample  dropped off to lab in [place]   Would the patient rather a call back or a response via [facility]? Call   Best Call Back Number:[phone]  Additional Information:

## 2024-05-23 ENCOUNTER — TELEPHONE (OUTPATIENT)
Dept: INFECTIOUS DISEASES | Facility: CLINIC | Age: 59
End: 2024-05-23
Payer: MEDICAID

## 2024-05-23 ENCOUNTER — TELEPHONE (OUTPATIENT)
Dept: FAMILY MEDICINE | Facility: HOSPITAL | Age: 59
End: 2024-05-23
Payer: MEDICAID

## 2024-05-23 LAB
ACID FAST MOD KINY STN SPEC: NORMAL
MYCOBACTERIUM SPEC QL CULT: NORMAL
MYCOBACTERIUM SPEC QL CULT: NORMAL

## 2024-05-23 NOTE — TELEPHONE ENCOUNTER
5/23/24 1423  Spoke to Wendy in lab. Stated the critical lab value was already reported to Disha James. Verbalized understanding.         ----- Message from Kavita Martel sent at 5/23/2024  2:16 PM CDT -----  Regarding: CRTICAL LAB VALUES  Contact: Wendy/yancy [phone]  Critical lab values for provider. Please call Wendy in lab at [phone].        Caller disconnected

## 2024-05-23 NOTE — TELEPHONE ENCOUNTER
Wendy from Microbiology lab at [facility] called with a Critical lab result of POSITIVE AFB culture. High Priority message sent to Dr. Son Henao. Value entered into Flowsheets. Secure chat message also sent.

## 2024-07-03 DIAGNOSIS — J43.8 OTHER EMPHYSEMA: ICD-10-CM

## 2024-07-03 NOTE — TELEPHONE ENCOUNTER
----- Message from Meagan Clements sent at 7/3/2024  3:16 PM CDT -----  Regarding: Refill  Contact: [phone]  Rx Refill/Request    Is this a Refill or New Rx:  Refill    Rx Name and Strength: albuterol-ipratropium (DUO-NEB) 2.5 mg-0.5 mg/3 mL nebulizer solution     Preferred Pharmacy with phone number:  [id number] - [place] - [address] AT [facility] [address]  [facility] [place] [address]  Phone: [phone] Fax: [phone]          Additional Information:Pt states insurance is denying medication, stating they will only approve 6 refills a year. Would like to know if you can up the number of refills. Please call [phone]

## 2024-07-11 RX ORDER — IPRATROPIUM BROMIDE AND ALBUTEROL SULFATE 2.5; .5 MG/3ML; MG/3ML
3 SOLUTION RESPIRATORY (INHALATION) EVERY 6 HOURS PRN
Qty: 90 EACH | Refills: 6 | Status: SHIPPED | OUTPATIENT
Start: 2024-07-11 | End: 2025-07-11

## 2024-07-15 ENCOUNTER — OFFICE VISIT (OUTPATIENT)
Dept: INFECTIOUS DISEASES | Facility: CLINIC | Age: 59
End: 2024-07-15
Payer: MEDICAID

## 2024-07-15 VITALS
WEIGHT: 175.81 LBS | HEART RATE: 95 BPM | DIASTOLIC BLOOD PRESSURE: 84 MMHG | BODY MASS INDEX: 23.81 KG/M2 | SYSTOLIC BLOOD PRESSURE: 154 MMHG | HEIGHT: 72 IN

## 2024-07-15 DIAGNOSIS — A31.0 MYCOBACTERIUM AVIUM-INTRACELLULARE INFECTION: Primary | ICD-10-CM

## 2024-07-15 PROCEDURE — 99999 PR PBB SHADOW E&M-EST. PATIENT-LVL III: CPT | Mod: PBBFAC,,, | Performed by: INTERNAL MEDICINE

## 2024-07-15 PROCEDURE — 1159F MED LIST DOCD IN RCRD: CPT | Mod: CPTII,,, | Performed by: INTERNAL MEDICINE

## 2024-07-15 PROCEDURE — 3077F SYST BP >= 140 MM HG: CPT | Mod: CPTII,,, | Performed by: INTERNAL MEDICINE

## 2024-07-15 PROCEDURE — 99213 OFFICE O/P EST LOW 20 MIN: CPT | Mod: PBBFAC,PN | Performed by: INTERNAL MEDICINE

## 2024-07-15 PROCEDURE — 3079F DIAST BP 80-89 MM HG: CPT | Mod: CPTII,,, | Performed by: INTERNAL MEDICINE

## 2024-07-15 PROCEDURE — 99213 OFFICE O/P EST LOW 20 MIN: CPT | Mod: S$PBB,,, | Performed by: INTERNAL MEDICINE

## 2024-07-15 PROCEDURE — 3008F BODY MASS INDEX DOCD: CPT | Mod: CPTII,,, | Performed by: INTERNAL MEDICINE

## 2024-07-15 NOTE — PROGRESS NOTES
Infectious Disease Clinic  Clinic note    Patient Name: Hai Gonzales  YOB: 1965    PRESENTING HISTORY       History of Present Illness:  Mr. Hai Gonzales is a 59 y/o male with tobacco abuse and no other significant history recently presented to [facility] ED with complaints of hemoptysis that started at 0430 day of admission. Endorsed night sweats, chills, intermittent subjective fever, and decreased appetite over the past two months. + intermittent non-bloody diarrheal episodes over the past week.~20lbs weight loss over the past month. He reports smoking history of 1.5 packs daily x 40 years (60 PY). CTA with worsening LLL and RUL bronchiectasis, bronchial thickening, and coalescent cavitation. Cxs were positive for MAC. He was meant to follow up in Non-TB mycobacterium clinic with Dr. Jolley but he states he can not get downtown for appts because of transportation.     1/23 - he started on azithromycin 500mg daily, ethambutol 800mg daily, and rifampin 600mg daily at last visit roughly 3 months ago. No side effects. Mild improvement in symptoms    4/24 - He feels the same. AFB cx from 1/23 with AFB growth. Initially was reported incorrectly. Awaiting amended report    7/24 - Since last visit he has continued his MAC treatment as above. His AFB cxs have been growing various mycobacterium. He initially has 3 cultures with AFB from last year. Then on 1/24/23 he grew mycobacterium kansasii. Then he grew MAC again on 4/18/23. 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 He notes no improvement in symptoms. If anything his cough is worse than before for the last 3 weeks    9/25 -did not get last AFB cx ordered. Symptoms unchanged.     12/4 - he reports improvement in symptoms. Most recent cxs continues to grow mycobacterium. Awaiting final ID    3/19 -He briefly stopped taking his azithromycin 500mg daily, ethambutol 800mg daily, and rifampin 600mg daily but started it again due to feeling poorly    5/13  - He continues to smoke. Was seen by pulm. He states that he can't get downtown to see the non-TB mycobacterium clinic. Still with unchanged symptoms and last CT was not improved    7/15 - symptoms remain the same. Most recent AFB cx is +    Review of Systems:  Negative except as above    PAST HISTORY:   No past medical history on file.    No past surgical history on file.    No family history on file.    Social History     Socioeconomic History    Marital status: Significant Other   Tobacco Use    Smoking status: Every Day     Current packs/day: 0.25     Types: Cigarettes    Smokeless tobacco: Never       MEDICATIONS & ALLERGIES:     Current Outpatient Medications on File Prior to Visit   Medication Sig    albuterol (PROVENTIL/VENTOLIN HFA) 90 mcg/actuation inhaler Inhale 2 puffs into the lungs every 6 (six) hours as needed for Wheezing. Rescue    albuterol-ipratropium (DUO-NEB) 2.5 mg-0.5 mg/3 mL nebulizer solution Take 3 mLs by nebulization every 6 (six) hours as needed for Wheezing or Shortness of Breath. Rescue    azithromycin (ZITHROMAX) 500 MG tablet TAKE 1 TABLET(500 MG) BY MOUTH EVERY DAY    budesonide-formoterol 160-4.5 mcg (SYMBICORT) 160-4.5 mcg/actuation HFAA Inhale 2 puffs into the lungs every 12 (twelve) hours. Controller    ethambutoL (MYAMBUTOL) 400 MG Tab TAKE 3 TABLETS(1200 MG) BY MOUTH EVERY DAY    LIDOcaine (LIDODERM) 5 % 1 patch.    LIDOcaine (LIDODERM) 5 % Place 1 patch onto the skin daily as needed. Remove & Discard patch within 12 hours or as directed by MD    nicotine (NICODERM CQ) 21 mg/24 hr Place 1 patch onto the skin once daily.    rifAMpin (RIFADIN) 300 MG capsule TAKE 2 CAPSULES(600 MG) BY MOUTH EVERY DAY    varenicline (CHANTIX STARTING MONTH BOX) 0.5 mg (11)- 1 mg (42) tablet Take one 0.5mg tab by mouth once daily X3 days,then increase to one 0.5mg tab twice daily X4 days,then increase to one 1mg tab twice daily    varenicline (CHANTIX) 1 mg Tab Take 1 tablet (1 mg total) by mouth 2  (two) times daily.    diclofenac sodium (VOLTAREN) 1 % Gel Apply 4 g topically 4 (four) times daily as needed. (Patient not taking: Reported on 7/15/2024)    HYDROcodone-acetaminophen (NORCO) 7.5-325 mg per tablet Take 1 tablet by mouth every 6 (six) hours as needed for Pain. (Patient not taking: Reported on 5/13/2024)    pulse oximeter (PULSE OXIMETER) device by Apply Externally route 2 (two) times a day. Use twice daily at 8 AM and 3 PM and record the value in [employer] as directed. (Patient not taking: Reported on 7/15/2024)     No current facility-administered medications on file prior to visit.       Review of patient's allergies indicates:  No Known Allergies    OBJECTIVE:   Vital Signs:  Vitals:    07/15/24 0834   BP: (!) 154/84   Pulse: 95   Weight: 79.8 kg (175 lb 13.1 oz)   Height: 6' (1.829 m)       No results found for this or any previous visit (from the past 24 hour(s)).      Physical Exam:   General:  Well developed, well nourished, no acute distress  HEENT:  Normocephalic, atraumatic, EOMI, clear sclera, throat clear without erythema or exudates  CVS:  RRR, S1 and S2 normal, no murmurs, rubs, gallops  Resp:  + rales, rhonchi  GI:  Abdomen soft, non-tender, non-distended, normoactive bowel sounds, no masses  MSK:  No muscle atrophy, peripheral edema, full range of motion  Skin:  No rashes, ulcers, erythema  Neuro:  CNII-XII grossly intact  Psych:  Alert and oriented to person, place, and time    ASSESSMENT:     1. Mycobacterium avium-intracellulare infection  59 y/o male with tobacco abuse and no other significant history recently presented to [facility] ED with complaints of hemoptysis and was found to have cavitary MAC disease. 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

## 2024-07-22 ENCOUNTER — TELEPHONE (OUTPATIENT)
Dept: PULMONOLOGY | Facility: CLINIC | Age: 59
End: 2024-07-22
Payer: MEDICAID

## 2024-07-22 NOTE — TELEPHONE ENCOUNTER
I received a Epic message from Dr Son Henao asking for a pulmonary appointment in August for Mr Christian, followup on MAC and emphysema.He will see Dr Acevedo on 8-16-24 at 1pm. Appointment slip mailed. Renata José

## 2024-07-22 NOTE — TELEPHONE ENCOUNTER
----- Message from Justin Ellerman, MD sent at 7/15/2024  2:25 PM CDT -----  Regarding: FW: Follow up  Renata,    Can we get this patient scheduled with Dr. Amber Sparks in July/August?     Thank you Dr. Henao.  ----- Message -----  From: Son Henao MD  Sent: 7/15/2024   9:04 AM CDT  To: Chayo Merida MD; Justin Ellerman, MD  Subject: Follow up                                        You recently saw this patient who and your note mentions seeing him again but I don't see a follow up appointment. Can he be scheduled?

## 2024-09-18 RX ORDER — ETHAMBUTOL HYDROCHLORIDE 400 MG/1
TABLET, FILM COATED ORAL
Qty: 90 TABLET | Refills: 5 | Status: SHIPPED | OUTPATIENT
Start: 2024-09-18

## 2024-10-17 ENCOUNTER — TELEPHONE (OUTPATIENT)
Dept: PULMONOLOGY | Facility: CLINIC | Age: 59
End: 2024-10-17
Payer: COMMERCIAL

## 2024-10-17 NOTE — TELEPHONE ENCOUNTER
Spoke with pt, informed him that I'm contacting him in regards to advising him that his appointment with Dr Acevedo has been rescheduled to 11/15/24 for 1:00 pm. Pt accepted appointment.

## 2024-11-04 ENCOUNTER — OFFICE VISIT (OUTPATIENT)
Dept: INFECTIOUS DISEASES | Facility: CLINIC | Age: 59
End: 2024-11-04
Payer: MEDICARE

## 2024-11-04 VITALS
WEIGHT: 189.81 LBS | DIASTOLIC BLOOD PRESSURE: 80 MMHG | HEIGHT: 72 IN | BODY MASS INDEX: 25.71 KG/M2 | SYSTOLIC BLOOD PRESSURE: 146 MMHG

## 2024-11-04 DIAGNOSIS — J43.8 OTHER EMPHYSEMA: ICD-10-CM

## 2024-11-04 DIAGNOSIS — A31.0 MYCOBACTERIUM AVIUM-INTRACELLULARE COMPLEX: ICD-10-CM

## 2024-11-04 DIAGNOSIS — A31.0 MYCOBACTERIUM AVIUM-INTRACELLULARE INFECTION: Primary | ICD-10-CM

## 2024-11-04 PROCEDURE — 3008F BODY MASS INDEX DOCD: CPT | Mod: CPTII,S$GLB,, | Performed by: INTERNAL MEDICINE

## 2024-11-04 PROCEDURE — 3079F DIAST BP 80-89 MM HG: CPT | Mod: CPTII,S$GLB,, | Performed by: INTERNAL MEDICINE

## 2024-11-04 PROCEDURE — 99213 OFFICE O/P EST LOW 20 MIN: CPT | Mod: S$GLB,,, | Performed by: INTERNAL MEDICINE

## 2024-11-04 PROCEDURE — 3077F SYST BP >= 140 MM HG: CPT | Mod: CPTII,S$GLB,, | Performed by: INTERNAL MEDICINE

## 2024-11-04 PROCEDURE — 1159F MED LIST DOCD IN RCRD: CPT | Mod: CPTII,S$GLB,, | Performed by: INTERNAL MEDICINE

## 2024-11-04 PROCEDURE — 99999 PR PBB SHADOW E&M-EST. PATIENT-LVL IV: CPT | Mod: PBBFAC,,, | Performed by: INTERNAL MEDICINE

## 2024-11-04 RX ORDER — ALBUTEROL SULFATE 90 UG/1
2 INHALANT RESPIRATORY (INHALATION) EVERY 6 HOURS PRN
Qty: 18 G | Refills: 11 | Status: SHIPPED | OUTPATIENT
Start: 2024-11-04 | End: 2025-11-04

## 2024-11-04 RX ORDER — BUDESONIDE AND FORMOTEROL FUMARATE DIHYDRATE 160; 4.5 UG/1; UG/1
2 AEROSOL RESPIRATORY (INHALATION) EVERY 12 HOURS
Qty: 6 G | Refills: 6 | Status: SHIPPED | OUTPATIENT
Start: 2024-11-04 | End: 2025-11-04

## 2024-11-04 NOTE — PROGRESS NOTES
Infectious Disease Clinic  Clinic note    Patient Name: Hai Gonzales  YOB: 1965    PRESENTING HISTORY       History of Present Illness:  Mr. Hai Gonzales is a 59 y/o male with tobacco abuse and no other significant history recently presented to [facility] ED with complaints of hemoptysis that started at 0430 day of admission. Endorsed night sweats, chills, intermittent subjective fever, and decreased appetite over the past two months. + intermittent non-bloody diarrheal episodes over the past week.~20lbs weight loss over the past month. He reports smoking history of 1.5 packs daily x 40 years (60 PY). CTA with worsening LLL and RUL bronchiectasis, bronchial thickening, and coalescent cavitation. Cxs were positive for MAC. He was meant to follow up in Non-TB mycobacterium clinic with Dr. Jolley but he states he can not get downtown for appts because of transportation.     1/23 - he started on azithromycin 500mg daily, ethambutol 800mg daily, and rifampin 600mg daily at last visit roughly 3 months ago. No side effects. Mild improvement in symptoms    4/24 - He feels the same. AFB cx from 1/23 with AFB growth. Initially was reported incorrectly. Awaiting amended report    7/24 - Since last visit he has continued his MAC treatment as above. His AFB cxs have been growing various mycobacterium. He initially has 3 cultures with AFB from last year. Then on 1/24/23 he grew mycobacterium kansasii. Then he grew MAC again on 4/18/23. 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 He notes no improvement in symptoms. If anything his cough is worse than before for the last 3 weeks    9/25 -did not get last AFB cx ordered. Symptoms unchanged.     12/4 - he reports improvement in symptoms. Most recent cxs continues to grow mycobacterium. Awaiting final ID    3/19 -He briefly stopped taking his azithromycin 500mg daily, ethambutol 800mg daily, and rifampin 600mg daily but started it again due to feeling poorly    5/13  - He continues to smoke. Was seen by pulm. He states that he can't get downtown to see the non-TB mycobacterium clinic. Still with unchanged symptoms and last CT was not improved    7/15 - symptoms remain the same. Most recent AFB cx is +    11/4 - he is scheduled to see pulm in a few weeks. Will stop therapy with azithromycin, ethambutol, and rifampin and see if pulm can offer other treatment    Review of Systems:  Negative except as above    PAST HISTORY:   No past medical history on file.    No past surgical history on file.    No family history on file.    Social History     Socioeconomic History    Marital status: Significant Other   Tobacco Use    Smoking status: Every Day     Current packs/day: 0.25     Types: Cigarettes    Smokeless tobacco: Never       MEDICATIONS & ALLERGIES:     Current Outpatient Medications on File Prior to Visit   Medication Sig    albuterol (PROVENTIL/VENTOLIN HFA) 90 mcg/actuation inhaler Inhale 2 puffs into the lungs every 6 (six) hours as needed for Wheezing. Rescue    albuterol-ipratropium (DUO-NEB) 2.5 mg-0.5 mg/3 mL nebulizer solution Take 3 mLs by nebulization every 6 (six) hours as needed for Wheezing or Shortness of Breath. Rescue    azithromycin (ZITHROMAX) 500 MG tablet TAKE 1 TABLET(500 MG) BY MOUTH EVERY DAY    budesonide-formoterol 160-4.5 mcg (SYMBICORT) 160-4.5 mcg/actuation HFAA Inhale 2 puffs into the lungs every 12 (twelve) hours. Controller    diclofenac sodium (VOLTAREN) 1 % Gel Apply 4 g topically 4 (four) times daily as needed.    ethambutoL (MYAMBUTOL) 400 MG Tab TAKE 3 TABLETS(1200 MG) BY MOUTH EVERY DAY    HYDROcodone-acetaminophen (NORCO) 7.5-325 mg per tablet Take 1 tablet by mouth every 6 (six) hours as needed for Pain.    LIDOcaine (LIDODERM) 5 % 1 patch.    LIDOcaine (LIDODERM) 5 % Place 1 patch onto the skin daily as needed. Remove & Discard patch within 12 hours or as directed by MD    nicotine (NICODERM CQ) 21 mg/24 hr Place 1 patch onto the skin once  daily.    pulse oximeter (PULSE OXIMETER) device by Apply Externally route 2 (two) times a day. Use twice daily at 8 AM and 3 PM and record the value in [employer] as directed.    rifAMpin (RIFADIN) 300 MG capsule TAKE 2 CAPSULES(600 MG) BY MOUTH EVERY DAY    varenicline (CHANTIX STARTING MONTH BOX) 0.5 mg (11)- 1 mg (42) tablet Take one 0.5mg tab by mouth once daily X3 days,then increase to one 0.5mg tab twice daily X4 days,then increase to one 1mg tab twice daily    varenicline (CHANTIX) 1 mg Tab Take 1 tablet (1 mg total) by mouth 2 (two) times daily.     No current facility-administered medications on file prior to visit.       Review of patient's allergies indicates:  No Known Allergies    OBJECTIVE:   Vital Signs:  Vitals:    11/04/24 0833   BP: (!) 146/80   Weight: 86.1 kg (189 lb 13.1 oz)   Height: 6' (1.829 m)       No results found for this or any previous visit (from the past 24 hours).      Physical Exam:   General:  Well developed, well nourished, no acute distress  HEENT:  Normocephalic, atraumatic, EOMI, clear sclera, throat clear without erythema or exudates  CVS:  RRR, S1 and S2 normal, no murmurs, rubs, gallops  Resp:  + rales, rhonchi  GI:  Abdomen soft, non-tender, non-distended, normoactive bowel sounds, no masses  MSK:  No muscle atrophy, peripheral edema, full range of motion  Skin:  No rashes, ulcers, erythema  Neuro:  CNII-XII grossly intact  Psych:  Alert and oriented to person, place, and time    ASSESSMENT:     1. Mycobacterium avium-intracellulare infection  57 y/o male with tobacco abuse and no other significant history recently presented to [facility] ED with complaints of hemoptysis and was found to have cavitary MAC disease. He refused to go to non-tb mycobacterium clinic due to transportation issues and was started on MAC therapy here       PLAN:   -negative HIV test last year  -continue to hold azithromycin 500mg daily, ethambutol daily 1200mg daily, and rifampin 600mg daily since he had  over 1 year of therapy without clearing cxs  -mycobacterium kansasii on 1/24  -mycobacterium parascrofulaceum on 5/1  -awaiting latest AFB cx which is +  -he is seeing pulm soon, after that will attempt to get him into nonTB mycobacterial clinic  -RTC 3 months

## 2024-11-15 ENCOUNTER — TELEPHONE (OUTPATIENT)
Dept: PULMONOLOGY | Facility: CLINIC | Age: 59
End: 2024-11-15
Payer: MEDICARE

## 2024-11-15 NOTE — TELEPHONE ENCOUNTER
Call was returned to patient in regards to rescheduling his appointment. Patient is rescheduled to 11/19/24 at 11:30 am with Dr Merida. Patient confirmed and verbalized understanding.

## 2024-11-19 ENCOUNTER — OFFICE VISIT (OUTPATIENT)
Dept: PULMONOLOGY | Facility: CLINIC | Age: 59
End: 2024-11-19
Payer: MEDICARE

## 2024-11-19 VITALS
WEIGHT: 192.69 LBS | DIASTOLIC BLOOD PRESSURE: 80 MMHG | HEART RATE: 86 BPM | BODY MASS INDEX: 26.1 KG/M2 | SYSTOLIC BLOOD PRESSURE: 142 MMHG | OXYGEN SATURATION: 97 % | HEIGHT: 72 IN

## 2024-11-19 DIAGNOSIS — A31.0 MYCOBACTERIUM AVIUM-INTRACELLULARE COMPLEX: ICD-10-CM

## 2024-11-19 DIAGNOSIS — J43.8 OTHER EMPHYSEMA: Primary | ICD-10-CM

## 2024-11-19 PROCEDURE — 3079F DIAST BP 80-89 MM HG: CPT | Mod: CPTII,S$GLB,, | Performed by: INTERNAL MEDICINE

## 2024-11-19 PROCEDURE — 90656 IIV3 VACC NO PRSV 0.5 ML IM: CPT | Mod: S$GLB,,, | Performed by: INTERNAL MEDICINE

## 2024-11-19 PROCEDURE — G0008 ADMIN INFLUENZA VIRUS VAC: HCPCS | Mod: S$GLB,,, | Performed by: INTERNAL MEDICINE

## 2024-11-19 PROCEDURE — 1159F MED LIST DOCD IN RCRD: CPT | Mod: CPTII,S$GLB,, | Performed by: INTERNAL MEDICINE

## 2024-11-19 PROCEDURE — 99999 PR PBB SHADOW E&M-EST. PATIENT-LVL III: CPT | Mod: PBBFAC,,, | Performed by: INTERNAL MEDICINE

## 2024-11-19 PROCEDURE — 3077F SYST BP >= 140 MM HG: CPT | Mod: CPTII,S$GLB,, | Performed by: INTERNAL MEDICINE

## 2024-11-19 PROCEDURE — 3008F BODY MASS INDEX DOCD: CPT | Mod: CPTII,S$GLB,, | Performed by: INTERNAL MEDICINE

## 2024-11-19 PROCEDURE — 99214 OFFICE O/P EST MOD 30 MIN: CPT | Mod: S$GLB,,, | Performed by: INTERNAL MEDICINE

## 2024-11-19 RX ORDER — IPRATROPIUM BROMIDE AND ALBUTEROL SULFATE 2.5; .5 MG/3ML; MG/3ML
3 SOLUTION RESPIRATORY (INHALATION) EVERY 6 HOURS PRN
Qty: 90 ML | Refills: 6 | Status: SHIPPED | OUTPATIENT
Start: 2024-11-19 | End: 2025-11-19

## 2024-11-19 NOTE — ASSESSMENT & PLAN NOTE
Continues to smoke and has new insurance so has not been able to get his Symbicort for over a month.  Had discussed triple therapy at his last appointment so sending in trilogy and hopes that he will be able to have that covered by his insurance we will make adjustments as needed based on his coverage plans.    Refilled his DuoNebs and also encouraged ongoing cessation efforts.      We will provide flu shot today

## 2024-11-19 NOTE — PROGRESS NOTES
Subjective:      Patient ID: Hai Gonzales is a 59 y.o. male.    Chief Complaint: Follow-up, Shortness of Breath, Medication Problem, Wheezing, and Cough  Interval History  Since our last visit his insurance has changed and he has been unable to obtain his inhalers.  He is also running low on his nebulizer treatments.  Does not see id again until January so plan is to obtain a new sputum sample now and discuss possible treatments.    Initial History    60 yo with no significant previous medical history that presented to the hospital with hemoptysis, weight loss, night sweats, and difficulty breathing and was subsequently found to have a larger cavitary lung lesion with smaller satellite cavitation lesions. He was admitted and sputum samples obtained to rule out TB. His sputum samples all returned 3/3 + for MAC. Has been following with ID since lat 2022 for MAC treatment without clearance. He continues to have significant sputum production daily, more in the morning and continues to smoke regularly.     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 Negative for hemoptysis and wheezing.    All other systems reviewed and are negative.    Objective:     Physical Exam  Vitals and nursing note reviewed.   Constitutional:       General: He is not in acute distress.     Appearance: He is well-developed. obeseHe is not diaphoretic.   HENT:      Head: Normocephalic and atraumatic.      Right  Ear: External ear normal.      Left Ear: External ear normal.   Eyes:      Conjunctiva/sclera: Conjunctivae normal.      Pupils: Pupils are equal, round, and reactive to light.   Neck:      Trachea: No tracheal deviation.   Cardiovascular:      Rate and Rhythm: Normal rate and regular rhythm.      Heart sounds: Normal heart sounds. No murmur heard.  Pulmonary:      Effort: Pulmonary effort is normal. No respiratory distress.      Breath sounds: No stridor. Decreased breath sounds and rhonchi present. No wheezing or rales.   Abdominal:      General: Bowel sounds are normal. There is no distension.      Palpations: Abdomen is soft.      Tenderness: There is no abdominal tenderness.   Musculoskeletal:         General: Normal range of motion.      Cervical back: Normal range of motion and neck supple.   Skin:     General: Skin is warm and dry.      Findings: No erythema.   Neurological:      Mental Status: He is alert and oriented to person, place, and time.      Gait: Gait is intact.   Psychiatric:         Mood and Affect: Mood and affect normal.         Behavior: Behavior normal.         Cognition and Memory: Memory normal.         Judgment: Judgment normal.     Personal Diagnostic Review      CT Chest 10/3: B stable cavitary lung disease     AFB Smear   9/26/23 +MAC Negative   7/26/23 +MAC Negative   5/1/23 +M. Parascrofulaceum  Negative   4/18/23 +MAC Negative   1/24/23 +M. kansasii Negative   8/16/22 +MAC Positive     04/19/2024---------Distance: 304.8 meters (1000 feet)       O2 Sat % Supplemental Oxygen Heart Rate Blood Pressure Nasir Scale   Pre-exercise  (Resting) 96 % Room Air 113 bpm 137/84 mmHg 3   During Exercise 97 % Room Air 136 bpm 138/82 mmHg 7-8   Post-exercise  (Recovery) 97 % Room Air  120 bpm          Recovery Time: 58 seconds     Performing nurse/tech: Estopinal RRT         \PFT 2022: [id number] 50, FEV1 32%, DLCO 47%     Assessment:     1. Other emphysema    2. Mycobacterium avium-intracellulare  complex        Orders Placed This Encounter   Procedures    Culture, Respiratory with Gram Stain     Standing Status:   Future     Standing Expiration Date:   2/17/2026    AFB Culture & Smear     Standing Status:   Future     Standing Expiration Date:   2/17/2026     Plan:     Problem List Items Addressed This Visit          Pulmonary    Other emphysema - Primary    Current Assessment & Plan     Continues to smoke and has new insurance so has not been able to get his Symbicort for over a month.  Had discussed triple therapy at his last appointment so sending in trilogy and hopes that he will be able to have that covered by his insurance we will make adjustments as needed based on his coverage plans.    Refilled his DuoNebs and also encouraged ongoing cessation efforts.      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    Obtaining a new sputum sample and we will discuss with Infectious Disease on next steps           Relevant Medications    influenza (Flulaval, Fluzone, Fluarix) 45 mcg/0.5 mL IM vaccine (> or = 6 mo) 0.5 mL (Completed)    Other Relevant Orders    Culture, Respiratory with Gram Stain    AFB Culture & Smear

## 2024-11-19 NOTE — ASSESSMENT & PLAN NOTE
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    Obtaining a new sputum sample and we will discuss with Infectious Disease on next steps

## 2024-11-20 DIAGNOSIS — A31.0 MYCOBACTERIUM AVIUM-INTRACELLULARE COMPLEX: Primary | ICD-10-CM

## 2024-11-21 ENCOUNTER — LAB VISIT (OUTPATIENT)
Dept: LAB | Facility: HOSPITAL | Age: 59
End: 2024-11-21
Attending: INTERNAL MEDICINE
Payer: MEDICARE

## 2024-11-21 DIAGNOSIS — A31.0 MYCOBACTERIUM AVIUM-INTRACELLULARE COMPLEX: ICD-10-CM

## 2024-11-21 PROCEDURE — 87070 CULTURE OTHR SPECIMN AEROBIC: CPT | Performed by: INTERNAL MEDICINE

## 2024-11-21 PROCEDURE — 87206 SMEAR FLUORESCENT/ACID STAI: CPT | Performed by: INTERNAL MEDICINE

## 2024-11-21 PROCEDURE — 87106 FUNGI IDENTIFICATION YEAST: CPT | Performed by: INTERNAL MEDICINE

## 2024-11-21 PROCEDURE — 87205 SMEAR GRAM STAIN: CPT | Performed by: INTERNAL MEDICINE

## 2024-11-21 PROCEDURE — 87116 MYCOBACTERIA CULTURE: CPT | Performed by: INTERNAL MEDICINE

## 2024-11-21 PROCEDURE — 87015 SPECIMEN INFECT AGNT CONCNTJ: CPT | Performed by: INTERNAL MEDICINE

## 2024-11-22 LAB
ACID FAST MOD KINY STN SPEC: NORMAL
MYCOBACTERIUM SPEC QL CULT: NORMAL

## 2024-11-25 LAB
BACTERIA SPEC AEROBE CULT: ABNORMAL
BACTERIA SPEC AEROBE CULT: ABNORMAL
GRAM STN SPEC: ABNORMAL

## 2025-01-14 ENCOUNTER — TELEPHONE (OUTPATIENT)
Dept: GASTROENTEROLOGY | Facility: CLINIC | Age: 60
End: 2025-01-14
Payer: MEDICARE

## 2025-01-14 NOTE — TELEPHONE ENCOUNTER
Attempted to contact patient to rescheduled appt on 03/03 due to provider being absent. Rescheduled patient to 03/24. Left VM and call back number

## 2025-02-26 ENCOUNTER — OFFICE VISIT (OUTPATIENT)
Dept: PULMONOLOGY | Facility: CLINIC | Age: 60
End: 2025-02-26
Payer: MEDICARE

## 2025-02-26 VITALS
BODY MASS INDEX: 26.43 KG/M2 | HEIGHT: 72 IN | WEIGHT: 195.13 LBS | OXYGEN SATURATION: 98 % | SYSTOLIC BLOOD PRESSURE: 144 MMHG | HEART RATE: 107 BPM | DIASTOLIC BLOOD PRESSURE: 84 MMHG

## 2025-02-26 DIAGNOSIS — J84.9 INTERSTITIAL PULMONARY DISEASE, UNSPECIFIED: Primary | ICD-10-CM

## 2025-02-26 DIAGNOSIS — J43.8 OTHER EMPHYSEMA: ICD-10-CM

## 2025-02-26 DIAGNOSIS — F17.210 CIGARETTE NICOTINE DEPENDENCE WITHOUT COMPLICATION: ICD-10-CM

## 2025-02-26 DIAGNOSIS — A31.0 MYCOBACTERIUM AVIUM-INTRACELLULARE COMPLEX: ICD-10-CM

## 2025-02-26 PROCEDURE — 99214 OFFICE O/P EST MOD 30 MIN: CPT | Mod: S$GLB,,, | Performed by: INTERNAL MEDICINE

## 2025-02-26 PROCEDURE — 3077F SYST BP >= 140 MM HG: CPT | Mod: CPTII,S$GLB,, | Performed by: INTERNAL MEDICINE

## 2025-02-26 PROCEDURE — 3008F BODY MASS INDEX DOCD: CPT | Mod: CPTII,S$GLB,, | Performed by: INTERNAL MEDICINE

## 2025-02-26 PROCEDURE — 99999 PR PBB SHADOW E&M-EST. PATIENT-LVL IV: CPT | Mod: PBBFAC,,, | Performed by: INTERNAL MEDICINE

## 2025-02-26 PROCEDURE — 1159F MED LIST DOCD IN RCRD: CPT | Mod: CPTII,S$GLB,, | Performed by: INTERNAL MEDICINE

## 2025-02-26 PROCEDURE — 3079F DIAST BP 80-89 MM HG: CPT | Mod: CPTII,S$GLB,, | Performed by: INTERNAL MEDICINE

## 2025-02-26 RX ORDER — BUPROPION HYDROCHLORIDE 150 MG/1
150 TABLET ORAL DAILY
Qty: 30 TABLET | Refills: 11 | Status: SHIPPED | OUTPATIENT
Start: 2025-02-26 | End: 2026-02-26

## 2025-02-26 RX ORDER — UMECLIDINIUM BROMIDE AND VILANTEROL TRIFENATATE 62.5; 25 UG/1; UG/1
1 POWDER RESPIRATORY (INHALATION) DAILY
Qty: 60 EACH | Refills: 5 | Status: SHIPPED | OUTPATIENT
Start: 2025-02-26

## 2025-02-26 NOTE — PROGRESS NOTES
Subjective:      Patient ID: Hai Gonzales is a 59 y.o. male.    Chief Complaint: Follow-up, Shortness of Breath, Cough, Wheezing, Chest Pain, and Emphysema  Interval History  He has continued to have significant sputum production but his most recent sputum cultures were negative. He had to stop his abx for MAC due to cost last summer. He is now back on his insurance and is willing to be treated if necessary. Also unable to afford trelegy. Using his nebulizer regularly.     Initial History    58 yo with no significant previous medical history that presented to the hospital with hemoptysis, weight loss, night sweats, and difficulty breathing and was subsequently found to have a larger cavitary lung lesion with smaller satellite cavitation lesions. He was admitted and sputum samples obtained to rule out TB. His sputum samples all returned 3/3 + for MAC. Has been following with ID since lat 2022 for MAC treatment without clearance. He continues to have significant sputum production daily, more in the morning and continues to smoke regularly.     Occupational Exposures:  construction work  Environmental Exposures:  none known  Tobacco/Smoking History:  50+ pack year smoking history               Objective:     Physical Exam  Vitals and nursing note reviewed.   Constitutional:       General: He is not in acute distress.     Appearance: He is well-developed. obeseHe is not diaphoretic.   HENT:      Head: Normocephalic and atraumatic.      Right Ear: External ear normal.      Left Ear: External ear normal.   Eyes:      Conjunctiva/sclera: Conjunctivae normal.      Pupils: Pupils are equal, round, and reactive to light.   Neck:      Trachea: No tracheal deviation.   Cardiovascular:      Rate and Rhythm: Normal rate and regular rhythm.      Heart sounds: Normal heart sounds. No murmur heard.  Pulmonary:      Effort: Pulmonary effort is normal. No respiratory distress.      Breath sounds: No stridor. Decreased breath sounds  and rhonchi present. No wheezing or rales.   Abdominal:      General: Bowel sounds are normal. There is no distension.      Palpations: Abdomen is soft.      Tenderness: There is no abdominal tenderness.   Musculoskeletal:         General: Normal range of motion.      Cervical back: Normal range of motion and neck supple.   Skin:     General: Skin is warm and dry.      Findings: No erythema.   Neurological:      Mental Status: He is alert and oriented to person, place, and time.      Gait: Gait is intact.   Psychiatric:         Mood and Affect: Mood and affect normal.         Behavior: Behavior normal.         Cognition and Memory: Memory normal.         Judgment: Judgment normal.     Personal Diagnostic Review      CT Chest 10/3: B stable cavitary lung disease     AFB Smear   9/26/23 +MAC Negative   7/26/23 +MAC Negative   5/1/23 +M. Parascrofulaceum  Negative   4/18/23 +MAC Negative   1/24/23 +M. kansasii Negative   8/16/22 +MAC Positive     04/19/2024---------Distance: 304.8 meters (1000 feet)       O2 Sat % Supplemental Oxygen Heart Rate Blood Pressure Nasir Scale   Pre-exercise  (Resting) 96 % Room Air 113 bpm 137/84 mmHg 3   During Exercise 97 % Room Air 136 bpm 138/82 mmHg 7-8   Post-exercise  (Recovery) 97 % Room Air  120 bpm          Recovery Time: 58 seconds     Performing nurse/tech: Estopinal RRT         \PFT 2022: [id number] 50, FEV1 32%, DLCO 47%     Assessment:     1. Interstitial pulmonary disease, unspecified    2. Other emphysema    3. Mycobacterium avium-intracellulare complex    4. Cigarette nicotine dependence without complication        Orders Placed This Encounter   Procedures    Culture, Respiratory with Gram Stain     Standing Status:   Future     Expected Date:   2/26/2025     Expiration Date:   5/27/2026    AFB Culture & Smear     Standing Status:   Future     Expected Date:   2/26/2025     Expiration Date:   5/27/2026    CT Chest Without Contrast     Standing Status:   Future     Expected  Date:   2/26/2025     Expiration Date:   2/26/2026     May the Radiologist modify the order per protocol to meet the clinical needs of the patient?:   Yes    Stress test, pulmonary     Standing Status:   Future     Expiration Date:   2/26/2026     Reason for study:   Oxygen prescription     Release to patient:   Immediate    Complete PFT w/ bronchodilator     Standing Status:   Future     Expiration Date:   2/26/2026     Release to patient:   Immediate     Plan:     Problem List Items Addressed This Visit          Pulmonary    Other emphysema    Current Assessment & Plan   Still smoking and could not continue chantix secondary to side effects  - starting wellbutrin today to see if that will help  - trying anoro to see if that is better covered and tolerated   - continue PRN nebulizer            ID    Mycobacterium avium-intracellulare complex    Current Assessment & Plan   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  Repeat CT, PFTs, and walk test ordered               Other    Nicotine dependence    Current Assessment & Plan   Dangers of cigarette smoking were reviewed with patient in detail. Started wellbutrin since chantix was not well tolerated.           Other Visit Diagnoses         Interstitial pulmonary disease, unspecified    -  Primary    Relevant Medications    umeclidinium-vilanteroL (ANORO ELLIPTA) 62.5-25 mcg/actuation DsDv    buPROPion (WELLBUTRIN XL) 150 MG TB24 tablet    Other Relevant Orders    CT Chest Without Contrast    Stress test, pulmonary    Complete PFT w/ bronchodilator    Culture, Respiratory with Gram Stain    AFB Culture & Smear

## 2025-02-26 NOTE — ASSESSMENT & PLAN NOTE
Dangers of cigarette smoking were reviewed with patient in detail. Started wellbutrin since chantix was not well tolerated.

## 2025-02-26 NOTE — ASSESSMENT & PLAN NOTE
Still smoking and could not continue chantix secondary to side effects  - starting wellbutrin today to see if that will help  - trying anoro to see if that is better covered and tolerated   - continue PRN nebulizer

## 2025-02-26 NOTE — ASSESSMENT & PLAN NOTE
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  Repeat CT, PFTs, and walk test ordered

## 2025-03-11 ENCOUNTER — TELEPHONE (OUTPATIENT)
Dept: PULMONOLOGY | Facility: CLINIC | Age: 60
End: 2025-03-11
Payer: MEDICARE

## 2025-03-17 ENCOUNTER — OFFICE VISIT (OUTPATIENT)
Dept: INFECTIOUS DISEASES | Facility: CLINIC | Age: 60
End: 2025-03-17
Payer: MEDICARE

## 2025-03-17 ENCOUNTER — HOSPITAL ENCOUNTER (OUTPATIENT)
Dept: RADIOLOGY | Facility: HOSPITAL | Age: 60
Discharge: HOME | End: 2025-03-17
Attending: INTERNAL MEDICINE
Payer: MEDICARE

## 2025-03-17 VITALS
TEMPERATURE: 98 F | SYSTOLIC BLOOD PRESSURE: 148 MMHG | WEIGHT: 199.75 LBS | HEART RATE: 104 BPM | BODY MASS INDEX: 27.06 KG/M2 | HEIGHT: 72 IN | DIASTOLIC BLOOD PRESSURE: 81 MMHG

## 2025-03-17 DIAGNOSIS — J84.9 INTERSTITIAL PULMONARY DISEASE, UNSPECIFIED: ICD-10-CM

## 2025-03-17 DIAGNOSIS — A31.0 MYCOBACTERIUM AVIUM-INTRACELLULARE COMPLEX: ICD-10-CM

## 2025-03-17 PROCEDURE — 3008F BODY MASS INDEX DOCD: CPT | Mod: CPTII,S$GLB,, | Performed by: INTERNAL MEDICINE

## 2025-03-17 PROCEDURE — 71250 CT THORAX DX C-: CPT | Mod: TC

## 2025-03-17 PROCEDURE — 3079F DIAST BP 80-89 MM HG: CPT | Mod: CPTII,S$GLB,, | Performed by: INTERNAL MEDICINE

## 2025-03-17 PROCEDURE — 3077F SYST BP >= 140 MM HG: CPT | Mod: CPTII,S$GLB,, | Performed by: INTERNAL MEDICINE

## 2025-03-17 PROCEDURE — 1159F MED LIST DOCD IN RCRD: CPT | Mod: CPTII,S$GLB,, | Performed by: INTERNAL MEDICINE

## 2025-03-17 PROCEDURE — 99213 OFFICE O/P EST LOW 20 MIN: CPT | Mod: S$GLB,,, | Performed by: INTERNAL MEDICINE

## 2025-03-17 PROCEDURE — 71250 CT THORAX DX C-: CPT | Mod: 26,,, | Performed by: RADIOLOGY

## 2025-03-17 PROCEDURE — 99999 PR PBB SHADOW E&M-EST. PATIENT-LVL IV: CPT | Mod: PBBFAC,,, | Performed by: INTERNAL MEDICINE

## 2025-03-17 NOTE — PROGRESS NOTES
Infectious Disease Clinic  Clinic note    Patient Name: Hai Gonzales  YOB: 1965    PRESENTING HISTORY       History of Present Illness:  Mr. Hai Gonzales is a 57 y/o male with tobacco abuse and no other significant history recently presented to [facility] ED with complaints of hemoptysis that started at 0430 day of admission. Endorsed night sweats, chills, intermittent subjective fever, and decreased appetite over the past two months. + intermittent non-bloody diarrheal episodes over the past week.~20lbs weight loss over the past month. He reports smoking history of 1.5 packs daily x 40 years (60 PY). CTA with worsening LLL and RUL bronchiectasis, bronchial thickening, and coalescent cavitation. Cxs were positive for MAC. He was meant to follow up in Non-TB mycobacterium clinic with Dr. Jolley but he states he can not get downtown for appts because of transportation.     1/23 - he started on azithromycin 500mg daily, ethambutol 800mg daily, and rifampin 600mg daily at last visit roughly 3 months ago. No side effects. Mild improvement in symptoms    4/24 - He feels the same. AFB cx from 1/23 with AFB growth. Initially was reported incorrectly. Awaiting amended report    7/24 - Since last visit he has continued his MAC treatment as above. His AFB cxs have been growing various mycobacterium. He initially has 3 cultures with AFB from last year. Then on 1/24/23 he grew mycobacterium kansasii. Then he grew MAC again on 4/18/23. 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 He notes no improvement in symptoms. If anything his cough is worse than before for the last 3 weeks    9/25 -did not get last AFB cx ordered. Symptoms unchanged.     12/4 - he reports improvement in symptoms. Most recent cxs continues to grow mycobacterium. Awaiting final ID    3/19 -He briefly stopped taking his azithromycin 500mg daily, ethambutol 800mg daily, and rifampin 600mg daily but started it again due to feeling poorly    5/13  - He continues to smoke. Was seen by pulm. He states that he can't get downtown to see the non-TB mycobacterium clinic. Still with unchanged symptoms and last CT was not improved    7/15 - symptoms remain the same. Most recent AFB cx is +    11/4 - he is scheduled to see pulm in a few weeks. Will stop therapy with azithromycin, ethambutol, and rifampin and see if pulm can offer other treatment    3/17 - has been of meds since last visit. Last AFB culture was negative. Symptoms are unchanged. Seen by Pulm who ordered another AFb cx    Review of Systems:  Negative except as above    PAST HISTORY:   No past medical history on file.    No past surgical history on file.    No family history on file.    Social History     Socioeconomic History    Marital status: Significant Other   Tobacco Use    Smoking status: Every Day     Current packs/day: 0.25     Types: Cigarettes    Smokeless tobacco: Never       MEDICATIONS & ALLERGIES:     Current Outpatient Medications on File Prior to Visit   Medication Sig    albuterol (PROVENTIL/VENTOLIN HFA) 90 mcg/actuation inhaler Inhale 2 puffs into the lungs every 6 (six) hours as needed for Wheezing. Rescue    albuterol-ipratropium (DUO-NEB) 2.5 mg-0.5 mg/3 mL nebulizer solution Use one vial (3 mL) by nebulization every 6 (six) hours as needed for Wheezing or Shortness of Breath - Rescue    azithromycin (ZITHROMAX) 500 MG tablet TAKE 1 TABLET(500 MG) BY MOUTH EVERY DAY    buPROPion (WELLBUTRIN XL) 150 MG TB24 tablet Take 1 tablet (150 mg total) by mouth once daily.    diclofenac sodium (VOLTAREN) 1 % Gel Apply 4 g topically 4 (four) times daily as needed.    ethambutoL (MYAMBUTOL) 400 MG Tab TAKE 3 TABLETS(1200 MG) BY MOUTH EVERY DAY    umeclidinium-vilanteroL (ANORO ELLIPTA) 62.5-25 mcg/actuation DsDv Inhale 1 puff into the lungs once daily. Controller    HYDROcodone-acetaminophen (NORCO) 7.5-325 mg per tablet Take 1 tablet by mouth every 6 (six) hours as needed for Pain. (Patient not  taking: Reported on 3/17/2025)    LIDOcaine (LIDODERM) 5 % 1 patch. (Patient not taking: Reported on 3/17/2025)    LIDOcaine (LIDODERM) 5 % Place 1 patch onto the skin daily as needed. Remove & Discard patch within 12 hours or as directed by MD (Patient not taking: Reported on 3/17/2025)    nicotine (NICODERM CQ) 21 mg/24 hr Place 1 patch onto the skin once daily. (Patient not taking: Reported on 3/17/2025)    pulse oximeter (PULSE OXIMETER) device by Apply Externally route 2 (two) times a day. Use twice daily at 8 AM and 3 PM and record the value in [employer] as directed. (Patient not taking: Reported on 3/17/2025)    rifAMpin (RIFADIN) 300 MG capsule TAKE 2 CAPSULES(600 MG) BY MOUTH EVERY DAY (Patient not taking: Reported on 3/17/2025)     No current facility-administered medications on file prior to visit.       Review of patient's allergies indicates:  No Known Allergies    OBJECTIVE:   Vital Signs:  Vitals:    03/17/25 0834   BP: (!) 148/81   Pulse: 104   Temp: 98 °F (36.7 °C)   TempSrc: Oral   Weight: 90.6 kg (199 lb 11.8 oz)   Height: 6' (1.829 m)       No results found for this or any previous visit (from the past 24 hours).      Physical Exam:   General:  Well developed, well nourished, no acute distress  HEENT:  Normocephalic, atraumatic, EOMI, clear sclera, throat clear without erythema or exudates  CVS:  RRR, S1 and S2 normal, no murmurs, rubs, gallops  Resp:  + rales, rhonchi  GI:  Abdomen soft, non-tender, non-distended, normoactive bowel sounds, no masses  MSK:  No muscle atrophy, peripheral edema, full range of motion  Skin:  No rashes, ulcers, erythema  Neuro:  CNII-XII grossly intact  Psych:  Alert and oriented to person, place, and time    ASSESSMENT:     1. Mycobacterium avium-intracellulare infection  59 y/o male with tobacco abuse and no other significant history recently presented to [facility] ED with complaints of hemoptysis and was found to have cavitary MAC disease. 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

## 2025-05-19 ENCOUNTER — TELEPHONE (OUTPATIENT)
Dept: PULMONOLOGY | Facility: CLINIC | Age: 60
End: 2025-05-19
Payer: MEDICARE

## 2025-05-19 NOTE — TELEPHONE ENCOUNTER
I spoke with patient in regards to rescheduling his appointment due to Dr. Merida leaving. Patient states he'll like for Dr. Merida to review his CT results. Patient had his CT on 3/17/25. I informed patient that I'll send Dr Merida a message to review and advise. Patient is rescheduled to 8/8/25 at 8:30 am with Dr. Laird. Patient confirmed and verbalized understanding.

## 2025-06-23 ENCOUNTER — OFFICE VISIT (OUTPATIENT)
Dept: INFECTIOUS DISEASES | Facility: CLINIC | Age: 60
End: 2025-06-23
Payer: MEDICARE

## 2025-06-23 VITALS
OXYGEN SATURATION: 99 % | DIASTOLIC BLOOD PRESSURE: 79 MMHG | WEIGHT: 203.81 LBS | HEART RATE: 106 BPM | RESPIRATION RATE: 20 BRPM | BODY MASS INDEX: 27.61 KG/M2 | HEIGHT: 72 IN | SYSTOLIC BLOOD PRESSURE: 139 MMHG

## 2025-06-23 DIAGNOSIS — A31.0 MYCOBACTERIUM AVIUM-INTRACELLULARE COMPLEX: Primary | ICD-10-CM

## 2025-06-23 PROCEDURE — 99999 PR PBB SHADOW E&M-EST. PATIENT-LVL IV: CPT | Mod: PBBFAC,,, | Performed by: INTERNAL MEDICINE

## 2025-06-23 PROCEDURE — 1159F MED LIST DOCD IN RCRD: CPT | Mod: CPTII,S$GLB,, | Performed by: INTERNAL MEDICINE

## 2025-06-23 PROCEDURE — 3075F SYST BP GE 130 - 139MM HG: CPT | Mod: CPTII,S$GLB,, | Performed by: INTERNAL MEDICINE

## 2025-06-23 PROCEDURE — 3008F BODY MASS INDEX DOCD: CPT | Mod: CPTII,S$GLB,, | Performed by: INTERNAL MEDICINE

## 2025-06-23 PROCEDURE — 3078F DIAST BP <80 MM HG: CPT | Mod: CPTII,S$GLB,, | Performed by: INTERNAL MEDICINE

## 2025-06-23 PROCEDURE — 99213 OFFICE O/P EST LOW 20 MIN: CPT | Mod: S$GLB,,, | Performed by: INTERNAL MEDICINE

## 2025-06-23 NOTE — PROGRESS NOTES
Infectious Disease Clinic  Clinic note    Patient Name: Hai Gonzales  YOB: 1965    PRESENTING HISTORY       History of Present Illness:  Mr. Hai Gonzales is a 59 y/o male with tobacco abuse and no other significant history recently presented to [facility] ED with complaints of hemoptysis that started at 0430 day of admission. Endorsed night sweats, chills, intermittent subjective fever, and decreased appetite over the past two months. + intermittent non-bloody diarrheal episodes over the past week.~20lbs weight loss over the past month. He reports smoking history of 1.5 packs daily x 40 years (60 PY). CTA with worsening LLL and RUL bronchiectasis, bronchial thickening, and coalescent cavitation. Cxs were positive for MAC. He was meant to follow up in Non-TB mycobacterium clinic with Dr. Jolley but he states he can not get downtown for appts because of transportation.     1/23 - he started on azithromycin 500mg daily, ethambutol 800mg daily, and rifampin 600mg daily at last visit roughly 3 months ago. No side effects. Mild improvement in symptoms    4/24 - He feels the same. AFB cx from 1/23 with AFB growth. Initially was reported incorrectly. Awaiting amended report    7/24 - Since last visit he has continued his MAC treatment as above. His AFB cxs have been growing various mycobacterium. He initially has 3 cultures with AFB from last year. Then on 1/24/23 he grew mycobacterium kansasii. Then he grew MAC again on 4/18/23. 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 He notes no improvement in symptoms. If anything his cough is worse than before for the last 3 weeks    9/25 -did not get last AFB cx ordered. Symptoms unchanged.     12/4 - he reports improvement in symptoms. Most recent cxs continues to grow mycobacterium. Awaiting final ID    3/19 -He briefly stopped taking his azithromycin 500mg daily, ethambutol 800mg daily, and rifampin 600mg daily but started it again due to feeling poorly    5/13  - He continues to smoke. Was seen by pulm. He states that he can't get downtown to see the non-TB mycobacterium clinic. Still with unchanged symptoms and last CT was not improved    7/15 - symptoms remain the same. Most recent AFB cx is +    11/4 - he is scheduled to see pulm in a few weeks. Will stop therapy with azithromycin, ethambutol, and rifampin and see if pulm can offer other treatment    3/17 - has been of meds since last visit. Last AFB culture was negative. Symptoms are unchanged. Seen by Pulm who ordered another AFb cx    6/23 - last 2 AFB cxs were negative    Review of Systems:  Negative except as above    PAST HISTORY:   History reviewed. No pertinent past medical history.    History reviewed. No pertinent surgical history.    No family history on file.    Social History     Socioeconomic History    Marital status: Significant Other   Tobacco Use    Smoking status: Every Day     Current packs/day: 0.25     Types: Cigarettes    Smokeless tobacco: Never       MEDICATIONS & ALLERGIES:     Current Outpatient Medications on File Prior to Visit   Medication Sig    albuterol (PROVENTIL/VENTOLIN HFA) 90 mcg/actuation inhaler Inhale 2 puffs into the lungs every 6 (six) hours as needed for Wheezing. Rescue    albuterol-ipratropium (DUO-NEB) 2.5 mg-0.5 mg/3 mL nebulizer solution Use one vial (3 mL) by nebulization every 6 (six) hours as needed for Wheezing or Shortness of Breath - Rescue    azithromycin (ZITHROMAX) 500 MG tablet TAKE 1 TABLET(500 MG) BY MOUTH EVERY DAY    buPROPion (WELLBUTRIN XL) 150 MG TB24 tablet Take 1 tablet (150 mg total) by mouth once daily.    diclofenac sodium (VOLTAREN) 1 % Gel Apply 4 g topically 4 (four) times daily as needed.    ethambutoL (MYAMBUTOL) 400 MG Tab TAKE 3 TABLETS(1200 MG) BY MOUTH EVERY DAY    HYDROcodone-acetaminophen (NORCO) 7.5-325 mg per tablet Take 1 tablet by mouth every 6 (six) hours as needed for Pain.    LIDOcaine (LIDODERM) 5 % 1 patch.    LIDOcaine (LIDODERM)  5 % Place 1 patch onto the skin daily as needed. Remove & Discard patch within 12 hours or as directed by MD    nicotine (NICODERM CQ) 21 mg/24 hr Place 1 patch onto the skin once daily.    pulse oximeter (PULSE OXIMETER) device by Apply Externally route 2 (two) times a day. Use twice daily at 8 AM and 3 PM and record the value in MyChart as directed.    rifAMpin (RIFADIN) 300 MG capsule TAKE 2 CAPSULES(600 MG) BY MOUTH EVERY DAY    umeclidinium-vilanteroL (ANORO ELLIPTA) 62.5-25 mcg/actuation DsDv Inhale 1 puff into the lungs once daily. Controller     No current facility-administered medications on file prior to visit.       Review of patient's allergies indicates:  No Known Allergies    OBJECTIVE:   Vital Signs:  Vitals:    06/23/25 0929   BP: 139/79   Pulse: 106   Resp: 20   SpO2: 99%   Weight: 92.4 kg (203 lb 13 oz)   Height: 6' (1.829 m)       No results found for this or any previous visit (from the past 24 hours).      Physical Exam:   General:  Well developed, well nourished, no acute distress  HEENT:  Normocephalic, atraumatic, EOMI, clear sclera, throat clear without erythema or exudates  CVS:  RRR, S1 and S2 normal, no murmurs, rubs, gallops  Resp:  + rales, rhonchi  GI:  Abdomen soft, non-tender, non-distended, normoactive bowel sounds, no masses  MSK:  No muscle atrophy, peripheral edema, full range of motion  Skin:  No rashes, ulcers, erythema  Neuro:  CNII-XII grossly intact  Psych:  Alert and oriented to person, place, and time    ASSESSMENT:     1. Mycobacterium avium-intracellulare infection  59 y/o male with tobacco abuse and no other significant history recently presented to [facility] ED with complaints of hemoptysis and was found to have cavitary MAC disease. 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

## 2025-06-24 ENCOUNTER — APPOINTMENT (OUTPATIENT)
Dept: LAB | Facility: HOSPITAL | Age: 60
End: 2025-06-24
Attending: INTERNAL MEDICINE
Payer: MEDICARE

## 2025-06-24 DIAGNOSIS — A31.0 MYCOBACTERIUM AVIUM-INTRACELLULARE COMPLEX: Primary | ICD-10-CM

## 2025-06-24 PROCEDURE — 87116 MYCOBACTERIA CULTURE: CPT

## 2025-06-24 PROCEDURE — 87206 SMEAR FLUORESCENT/ACID STAI: CPT

## 2025-06-26 LAB — ACID FAST MOD KINY STN SPEC: NORMAL

## 2025-07-09 LAB — MYCOBACTERIUM SPEC QL CULT: NORMAL

## 2025-08-07 NOTE — PROGRESS NOTES
[facility]  Pulmonary Clinic Note      History of Present Illness:  Hai Gonzales is a 60 y.o. male with PMHx pulmonary MAC and tobacco use who presents to clinic for MAC.     Following with ID since 2022 for pulmonary MAC, has not had clearance. R/A/E treatment held by ID 11/4/2024. Sputum AFB cultures have been negative since 11/2024.     Reports his breathing is not doing well. Reports productive cough with greenish brownish sputum, sometimes blood tinged. Also endorses SOB with exertion, especially with showering. Breathing symptoms are worse with the heat outside right now. Denies fevers and chills, endorses night sweats. Current smoker, about 1/2 PPD since age 17. Previously a .      Pertinent History:  History obtained by patient interview and supplemented by chart review.   Pulm medications: anoro, albuterol  Tobacco/Vape hx: 1/2 PPD since age 17  EtOH/illicit drug use: denies  Occupational hx: former     Sputum History:  6/24/25 AFB: negative   3/17/25 AFB: negative  3/17/25 Sputum Cx: candida albicans  11/21/24 AFB: negative  11/21/24 Sputum Cx: candida albicans  5/14/24 AFB: MAC (susceptibilities below)  9/26/23 AFB: MAC  7/26/23 AFB: MAC  1/24/23 AFB: m. [place]ii, m parascrofulaceum   8/16/22 AFB: MAC  8/15/22 Sputum Cx: normal paige    5/14/24 Susceptibilities  ----------------------------------------------------------   Organism    MYCOBACTERIUM AVIUM COMPLEX   Antibiotic                   MELIZA (mcg/mL)  Interpretation   ----------------------------------------------------------   Clofazimine                            0.12   Moxifloxacin                           >4       R   Clarithromycin                        >64     R   Amikacin (IV)                          64       R   Amikacin (liposomal, inhaled) 64       S   Linezolid                                  16       I   Moxifloxacin: The in vivo effectiveness of this agent for   MAC disease is unproven.    Clarithromycin: Clarithromycin is the class drug for   macrolides and the only macrolide that needs to be tested.   Linezolid: The in vivo effectiveness of this agent for MAC   disease is unproven.   ------------------------------------------------------------   S=SUSCEPTIBLE I=INTERMEDIATE  R=RESISTANT   NS=NONSUSCEPTIBLE  SDD=SUSCEPTIBLE DOSE DEPENDENT   ------------------------------------------------------------     Pulmonary/Cardiology Testing:  I have independently reviewed the following studies:    CT Chest 3/17/25  Impression:  Similar distribution of cavitary lesions and multiple solid pulmonary nodules compatible with reported history of MAC.  No significant interval detrimental change.  Clinical consideration to determine follow-up. Stable shotty-prominent mediastinal and hilar nodes.    No past medical history on file.  No past surgical history on file.  No family history on file.  Social History[1]  Review of patient's allergies indicates:  No Known Allergies    Review of Systems:  Review of Systems   Constitutional:  Negative for chills, fever and weight loss.   HENT:  Negative for congestion, sinus pain and sore throat.    Respiratory:  Positive for cough, sputum production, shortness of breath and wheezing.    Cardiovascular:  Negative for chest pain and leg swelling.   Gastrointestinal:  Negative for abdominal pain, heartburn, nausea and vomiting.   All other systems reviewed and are negative.         OBJECTIVE:     Vital Signs  Vitals:    08/08/25 0827   BP: (!) 147/75   BP Location: Left arm   Patient Position: Sitting   Pulse: 102   SpO2: 98%   Weight: 94 kg (207 lb 4.8 oz)   Height: 6' (1.829 m)     Body mass index is 28.11 kg/m².    Physical Exam:  Physical Exam  Vitals reviewed.   Constitutional:       General: He is not in acute distress.     Appearance: He is not ill-appearing.   HENT:      Head: Normocephalic and atraumatic.      Mouth/Throat:      Mouth: Mucous membranes are moist.    Eyes:      Extraocular Movements: Extraocular movements intact.      Conjunctiva/sclera: Conjunctivae normal.   Cardiovascular:      Pulses: Normal pulses.   Pulmonary:      Effort: No respiratory distress.      Breath sounds: No wheezing or rhonchi.   Skin:     General: Skin is warm and dry.   Neurological:      Mental Status: He is alert and oriented to person, place, and time.          Laboratory:  CBC  Lab Results   Component Value Date    WBC 11.17 10/03/2023    HGB 16.1 10/03/2023    HCT 46.2 10/03/2023     10/03/2023    MCV 95 10/03/2023    RDW 12.5 10/03/2023     BMP  Lab Results   Component Value Date     (L) 03/18/2024    K 4.3 03/18/2024    CL 97 03/18/2024    CO2 23 03/18/2024    BUN 7 03/18/2024    CREATININE 0.7 03/18/2024    GLU 77 03/18/2024    CALCIUM 9.3 03/18/2024    MG 1.9 08/16/2022    PHOS 3.5 08/16/2022     LFTs  Lab Results   Component Value Date    PROT 7.8 03/18/2024    ALBUMIN 4.0 03/18/2024    BILITOT 0.3 03/18/2024    AST 19 03/18/2024    ALKPHOS 91 03/18/2024    ALT 15 03/18/2024       All diagnostic tests were reviewed personally, including labs, chest xrays, ct chests, echocardiograms and pulmonary function tests.     ASSESSMENT/PLAN:     Problem List Items Addressed This Visit       Nicotine dependence    Current Assessment & Plan   - 1/2 PPD since age 17.   - Has tried quitting in past, did not like chantix due to side effects.   - Will try wellbutrin instead.   - Discussed importance of quitting for his lung diseases.          Relevant Medications    buPROPion (WELLBUTRIN XL) 150 MG TB24 tablet    Mycobacterium avium-intracellulare complex - Primary    Current Assessment & Plan   - Still has significant sputum production and cough.  - Most recent sputums x2 have been clear.   - Will schedule for bronch with BAL.   - Have ordered saline nebs and flutter valve for airway clearance.          Relevant Medications    sodium chloride 3% 3 % nebulizer solution    mucus  clearing device (ACAPELLA, FLUTTER)    Other Relevant Orders    BRONCHOSCOPY - BRONCHOALVEOLAR LAVAGE    Centrilobular emphysema    Current Assessment & Plan   - Worsening symptoms of SOB with exertion.   - Not using inhalers consistently.   - Start anoro daily.   - Continue albuterol and nebs prn.   - Discussed importance of quitting smoking and adding light exercise like walking.   - PFTs scheduled.          Relevant Medications    albuterol-ipratropium (DUO-NEB) 2.5 mg-0.5 mg/3 mL nebulizer solution    albuterol (PROVENTIL/VENTOLIN HFA) 90 mcg/actuation inhaler    umeclidinium-vilanteroL (ANORO ELLIPTA) 62.5-25 mcg/actuation DsDv      Return to clinic in 3 months    Arlyn Laird MD  Pulmonary/Critical Care  [facility]             [1]   Social History  Socioeconomic History    Marital status: Significant Other   Tobacco Use    Smoking status: Every Day     Current packs/day: 0.25     Types: Cigarettes    Smokeless tobacco: Never

## 2025-08-08 ENCOUNTER — OFFICE VISIT (OUTPATIENT)
Facility: CLINIC | Age: 60
End: 2025-08-08
Payer: MEDICARE

## 2025-08-08 VITALS
SYSTOLIC BLOOD PRESSURE: 147 MMHG | HEART RATE: 102 BPM | WEIGHT: 207.31 LBS | HEIGHT: 72 IN | OXYGEN SATURATION: 98 % | BODY MASS INDEX: 28.08 KG/M2 | DIASTOLIC BLOOD PRESSURE: 75 MMHG

## 2025-08-08 DIAGNOSIS — J43.2 CENTRILOBULAR EMPHYSEMA: ICD-10-CM

## 2025-08-08 DIAGNOSIS — J43.8 OTHER EMPHYSEMA: ICD-10-CM

## 2025-08-08 DIAGNOSIS — F17.210 CIGARETTE NICOTINE DEPENDENCE WITHOUT COMPLICATION: ICD-10-CM

## 2025-08-08 DIAGNOSIS — A31.0 MYCOBACTERIUM AVIUM-INTRACELLULARE COMPLEX: Primary | ICD-10-CM

## 2025-08-08 PROCEDURE — 99999 PR PBB SHADOW E&M-EST. PATIENT-LVL III: CPT | Mod: PBBFAC,,, | Performed by: STUDENT IN AN ORGANIZED HEALTH CARE EDUCATION/TRAINING PROGRAM

## 2025-08-08 RX ORDER — ALBUTEROL SULFATE 90 UG/1
2 INHALANT RESPIRATORY (INHALATION) EVERY 6 HOURS PRN
Qty: 18 G | Refills: 11 | Status: SHIPPED | OUTPATIENT
Start: 2025-08-08 | End: 2026-08-08

## 2025-08-08 RX ORDER — SODIUM CHLORIDE FOR INHALATION 3 %
4 VIAL, NEBULIZER (ML) INHALATION
Qty: 240 ML | Refills: 11 | Status: SHIPPED | OUTPATIENT
Start: 2025-08-08 | End: 2025-09-07

## 2025-08-08 RX ORDER — IPRATROPIUM BROMIDE AND ALBUTEROL SULFATE 2.5; .5 MG/3ML; MG/3ML
3 SOLUTION RESPIRATORY (INHALATION) EVERY 6 HOURS PRN
Qty: 90 ML | Refills: 11 | Status: SHIPPED | OUTPATIENT
Start: 2025-08-08 | End: 2026-08-08

## 2025-08-08 RX ORDER — BUPROPION HYDROCHLORIDE 150 MG/1
150 TABLET ORAL DAILY
Qty: 30 TABLET | Refills: 11 | Status: SHIPPED | OUTPATIENT
Start: 2025-08-08 | End: 2026-08-08

## 2025-08-08 RX ORDER — UMECLIDINIUM BROMIDE AND VILANTEROL TRIFENATATE 62.5; 25 UG/1; UG/1
1 POWDER RESPIRATORY (INHALATION) DAILY
Qty: 60 EACH | Refills: 11 | Status: SHIPPED | OUTPATIENT
Start: 2025-08-08

## 2025-08-08 NOTE — ASSESSMENT & PLAN NOTE
- 1/2 PPD since age 17.   - Has tried quitting in past, did not like chantix due to side effects.   - Will try wellbutrin instead.   - Discussed importance of quitting for his lung diseases.

## 2025-08-08 NOTE — ASSESSMENT & PLAN NOTE
- Worsening symptoms of SOB with exertion.   - Not using inhalers consistently.   - Start anoro daily.   - Continue albuterol and nebs prn.   - Discussed importance of quitting smoking and adding light exercise like walking.   - PFTs scheduled.

## 2025-08-08 NOTE — ASSESSMENT & PLAN NOTE
- Still has significant sputum production and cough.  - Most recent sputums x2 have been clear.   - Will schedule for bronch with BAL.   - Have ordered saline nebs and flutter valve for airway clearance.

## 2025-08-08 NOTE — PATIENT INSTRUCTIONS
Start Anoro inhaler daily.   Use Albuterol as needed.   Use nebulizer treatments as needed.     If unable to get flutter valve through insurance, they are available on amaz[employer].

## 2025-08-11 ENCOUNTER — OFFICE VISIT (OUTPATIENT)
Dept: INFECTIOUS DISEASES | Facility: CLINIC | Age: 60
End: 2025-08-11
Payer: MEDICARE

## 2025-08-11 ENCOUNTER — TELEPHONE (OUTPATIENT)
Facility: CLINIC | Age: 60
End: 2025-08-11

## 2025-08-11 ENCOUNTER — HOSPITAL ENCOUNTER (OUTPATIENT)
Dept: PULMONOLOGY | Facility: HOSPITAL | Age: 60
Discharge: HOME | End: 2025-08-11
Attending: STUDENT IN AN ORGANIZED HEALTH CARE EDUCATION/TRAINING PROGRAM
Payer: MEDICARE

## 2025-08-11 VITALS
WEIGHT: 206.56 LBS | HEART RATE: 97 BPM | SYSTOLIC BLOOD PRESSURE: 139 MMHG | BODY MASS INDEX: 27.98 KG/M2 | HEIGHT: 72 IN | DIASTOLIC BLOOD PRESSURE: 84 MMHG | TEMPERATURE: 97 F

## 2025-08-11 DIAGNOSIS — J84.9 INTERSTITIAL PULMONARY DISEASE, UNSPECIFIED: ICD-10-CM

## 2025-08-11 DIAGNOSIS — A31.0 MYCOBACTERIUM AVIUM-INTRACELLULARE COMPLEX: Primary | ICD-10-CM

## 2025-08-11 PROCEDURE — 99999 PR PBB SHADOW E&M-EST. PATIENT-LVL IV: CPT | Mod: PBBFAC,,, | Performed by: INTERNAL MEDICINE

## 2025-08-11 PROCEDURE — 94070 EVALUATION OF WHEEZING: CPT

## 2025-08-14 LAB
BRPFT: ABNORMAL
FEF 25 75 LLN: 1.13
FEF 25 75 PRE REF: 19.9 %
FEF 25 75 REF: 2.71
FEV1 FVC LLN: 66
FEV1 FVC PRE REF: 48 %
FEV1 FVC REF: 78
FEV1 LLN: 2.35
FEV1 PRE REF: 35.1 %
FEV1 REF: 3.25
FVC LLN: 3.11
FVC PRE REF: 73.1 %
FVC REF: 4.19
PEF LLN: 6.11
PEF PRE REF: 13.6 %
PEF REF: 8.92
PRE FEF 25 75: 0.54 L/S (ref 1.13–4.97)
PRE FET 100: 7.72 SEC
PRE FEV1 FVC: 37.26 % (ref 66.1–87.84)
PRE FEV1: 1.14 L (ref 2.35–4.09)
PRE FVC: 3.06 L (ref 3.11–5.28)
PRE PEF: 1.22 L/S (ref 6.11–11.73)

## 2025-09-02 ENCOUNTER — ANESTHESIA EVENT (OUTPATIENT)
Dept: SURGERY | Facility: HOSPITAL | Age: 60
End: 2025-09-02
Payer: MEDICARE

## 2025-09-03 ENCOUNTER — ANESTHESIA (OUTPATIENT)
Dept: SURGERY | Facility: HOSPITAL | Age: 60
End: 2025-09-03
Payer: MEDICARE

## 2025-09-03 PROCEDURE — 63600175 PHARM REV CODE 636 W HCPCS

## 2025-09-03 PROCEDURE — 25000242 PHARM REV CODE 250 ALT 637 W/ HCPCS

## 2025-09-03 PROCEDURE — 25000003 PHARM REV CODE 250

## 2025-09-03 RX ORDER — FENTANYL CITRATE 50 UG/ML
INJECTION, SOLUTION INTRAMUSCULAR; INTRAVENOUS
Status: DISCONTINUED | OUTPATIENT
Start: 2025-09-03 | End: 2025-09-03

## 2025-09-03 RX ORDER — LIDOCAINE HYDROCHLORIDE 20 MG/ML
INJECTION INTRAVENOUS
Status: DISCONTINUED | OUTPATIENT
Start: 2025-09-03 | End: 2025-09-03

## 2025-09-03 RX ORDER — DEXAMETHASONE SODIUM PHOSPHATE 4 MG/ML
INJECTION, SOLUTION INTRA-ARTICULAR; INTRALESIONAL; INTRAMUSCULAR; INTRAVENOUS; SOFT TISSUE
Status: DISCONTINUED | OUTPATIENT
Start: 2025-09-03 | End: 2025-09-03

## 2025-09-03 RX ORDER — ALBUTEROL SULFATE 90 UG/1
INHALANT RESPIRATORY (INHALATION)
Status: DISCONTINUED | OUTPATIENT
Start: 2025-09-03 | End: 2025-09-03

## 2025-09-03 RX ORDER — ONDANSETRON HYDROCHLORIDE 2 MG/ML
INJECTION, SOLUTION INTRAMUSCULAR; INTRAVENOUS
Status: DISCONTINUED | OUTPATIENT
Start: 2025-09-03 | End: 2025-09-03

## 2025-09-03 RX ORDER — ROCURONIUM BROMIDE 10 MG/ML
INJECTION, SOLUTION INTRAVENOUS
Status: DISCONTINUED | OUTPATIENT
Start: 2025-09-03 | End: 2025-09-03

## 2025-09-03 RX ORDER — PROPOFOL 10 MG/ML
VIAL (ML) INTRAVENOUS
Status: DISCONTINUED | OUTPATIENT
Start: 2025-09-03 | End: 2025-09-03

## 2025-09-03 RX ADMIN — PROPOFOL 20 MG: 10 INJECTION, EMULSION INTRAVENOUS at 09:09

## 2025-09-03 RX ADMIN — SUGAMMADEX 400 MG: 100 INJECTION, SOLUTION INTRAVENOUS at 09:09

## 2025-09-03 RX ADMIN — ROCURONIUM BROMIDE 50 MG: 10 INJECTION, SOLUTION INTRAVENOUS at 09:09

## 2025-09-03 RX ADMIN — ALBUTEROL SULFATE 2 PUFF: 90 AEROSOL, METERED RESPIRATORY (INHALATION) at 09:09

## 2025-09-03 RX ADMIN — FENTANYL CITRATE 100 MCG: 0.05 INJECTION, SOLUTION INTRAMUSCULAR; INTRAVENOUS at 09:09

## 2025-09-03 RX ADMIN — SODIUM CHLORIDE: 0.9 INJECTION, SOLUTION INTRAVENOUS at 09:09

## 2025-09-03 RX ADMIN — DEXAMETHASONE SODIUM PHOSPHATE 8 MG: 4 INJECTION, SOLUTION INTRA-ARTICULAR; INTRALESIONAL; INTRAMUSCULAR; INTRAVENOUS; SOFT TISSUE at 09:09

## 2025-09-03 RX ADMIN — GLYCOPYRROLATE 0.1 MG: 0.2 INJECTION, SOLUTION INTRAMUSCULAR; INTRAVITREAL at 09:09

## 2025-09-03 RX ADMIN — LIDOCAINE HYDROCHLORIDE 100 MG: 20 INJECTION, SOLUTION INTRAVENOUS at 09:09

## 2025-09-03 RX ADMIN — ONDANSETRON 4 MG: 2 INJECTION INTRAMUSCULAR; INTRAVENOUS at 09:09

## 2025-09-03 RX ADMIN — PROPOFOL 200 MG: 10 INJECTION, EMULSION INTRAVENOUS at 09:09
